# Patient Record
Sex: FEMALE | Race: WHITE | NOT HISPANIC OR LATINO | Employment: OTHER | URBAN - METROPOLITAN AREA
[De-identification: names, ages, dates, MRNs, and addresses within clinical notes are randomized per-mention and may not be internally consistent; named-entity substitution may affect disease eponyms.]

---

## 2020-01-01 ENCOUNTER — APPOINTMENT (OUTPATIENT)
Dept: CT IMAGING | Facility: CLINIC | Age: 50
End: 2020-01-01
Attending: EMERGENCY MEDICINE
Payer: COMMERCIAL

## 2020-01-01 ENCOUNTER — HOSPITAL ENCOUNTER (EMERGENCY)
Facility: CLINIC | Age: 50
Discharge: HOME | End: 2020-01-01
Attending: EMERGENCY MEDICINE | Admitting: EMERGENCY MEDICINE
Payer: COMMERCIAL

## 2020-01-01 VITALS
TEMPERATURE: 97 F | BODY MASS INDEX: 23.8 KG/M2 | WEIGHT: 170 LBS | RESPIRATION RATE: 16 BRPM | DIASTOLIC BLOOD PRESSURE: 66 MMHG | HEART RATE: 59 BPM | OXYGEN SATURATION: 95 % | HEIGHT: 71 IN | SYSTOLIC BLOOD PRESSURE: 103 MMHG

## 2020-01-01 DIAGNOSIS — G44.209 TENSION HEADACHE: ICD-10-CM

## 2020-01-01 DIAGNOSIS — M54.2 NECK PAIN: ICD-10-CM

## 2020-01-01 PROCEDURE — 96361 HYDRATE IV INFUSION ADD-ON: CPT

## 2020-01-01 PROCEDURE — 96375 TX/PRO/DX INJ NEW DRUG ADDON: CPT

## 2020-01-01 PROCEDURE — 96374 THER/PROPH/DIAG INJ IV PUSH: CPT

## 2020-01-01 PROCEDURE — 25000128 H RX IP 250 OP 636: Performed by: EMERGENCY MEDICINE

## 2020-01-01 PROCEDURE — 99285 EMERGENCY DEPT VISIT HI MDM: CPT | Mod: 25

## 2020-01-01 PROCEDURE — 70450 CT HEAD/BRAIN W/O DYE: CPT

## 2020-01-01 PROCEDURE — 25800030 ZZH RX IP 258 OP 636: Performed by: EMERGENCY MEDICINE

## 2020-01-01 RX ORDER — KETOROLAC TROMETHAMINE 30 MG/ML
30 INJECTION, SOLUTION INTRAMUSCULAR; INTRAVENOUS ONCE
Status: COMPLETED | OUTPATIENT
Start: 2020-01-01 | End: 2020-01-01

## 2020-01-01 RX ORDER — HYDROMORPHONE HYDROCHLORIDE 1 MG/ML
0.5 INJECTION, SOLUTION INTRAMUSCULAR; INTRAVENOUS; SUBCUTANEOUS ONCE
Status: COMPLETED | OUTPATIENT
Start: 2020-01-01 | End: 2020-01-01

## 2020-01-01 RX ORDER — HYDROCODONE BITARTRATE AND ACETAMINOPHEN 5; 325 MG/1; MG/1
1-2 TABLET ORAL EVERY 6 HOURS PRN
Qty: 10 TABLET | Refills: 0 | Status: SHIPPED | OUTPATIENT
Start: 2020-01-01 | End: 2020-01-04

## 2020-01-01 RX ORDER — METHOCARBAMOL 750 MG/1
750 TABLET, FILM COATED ORAL 4 TIMES DAILY PRN
Qty: 20 TABLET | Refills: 0 | Status: SHIPPED | OUTPATIENT
Start: 2020-01-01 | End: 2024-07-15

## 2020-01-01 RX ADMIN — KETOROLAC TROMETHAMINE 30 MG: 30 INJECTION, SOLUTION INTRAMUSCULAR at 15:25

## 2020-01-01 RX ADMIN — HYDROMORPHONE HYDROCHLORIDE 0.5 MG: 1 INJECTION, SOLUTION INTRAMUSCULAR; INTRAVENOUS; SUBCUTANEOUS at 16:29

## 2020-01-01 RX ADMIN — PROCHLORPERAZINE EDISYLATE 10 MG: 5 INJECTION INTRAMUSCULAR; INTRAVENOUS at 15:25

## 2020-01-01 RX ADMIN — SODIUM CHLORIDE 1000 ML: 9 INJECTION, SOLUTION INTRAVENOUS at 15:24

## 2020-01-01 ASSESSMENT — ENCOUNTER SYMPTOMS
WEAKNESS: 0
SHORTNESS OF BREATH: 0
VOMITING: 0
NECK PAIN: 1
APPETITE CHANGE: 1
EYE PAIN: 0
ABDOMINAL PAIN: 0
NUMBNESS: 0
FEVER: 0
NAUSEA: 1
HEADACHES: 1
COUGH: 0

## 2020-01-01 ASSESSMENT — MIFFLIN-ST. JEOR: SCORE: 1487.24

## 2020-01-01 NOTE — ED AVS SNAPSHOT
Emergency Department  [address] [facility] [address]  Phone:  [phone]  Fax:  [phone]                                    Debi Reed   MRN: [id number]    Department:   Emergency Department   Date of Visit:  1/1/2020           After Visit Summary Signature Page    I have received my discharge instructions, and my questions have been answered. I have discussed any challenges I see with this plan with the nurse or doctor.    ..........................................................................................................................................  Patient/Patient Representative Signature      ..........................................................................................................................................  Patient Representative Print Name and Relationship to Patient    ..................................................               ................................................  Date                                   Time    ..........................................................................................................................................  Reviewed by Signature/Title    ...................................................              ..............................................  Date                                               Time          22EPIC Rev 08/18

## 2020-01-01 NOTE — ED PROVIDER NOTES
History   Chief Complaint:  Headache    HPI   Debi Reed is a 49 year old female, with a history of neck pain, who presents to the ED for evaluation of headache. The patient reports waking up with a headache three days ago, where it was predominately on the left side of her head, but is felt throughout. This headache was not a severe sudden onset, rather gradually worsened over time. The patient states she had broken her glasses and thought the headache was due to that, but realized she kept waking up with the headache so she thought it couldn't be that. The patient proceeded to get her eye pressure checked out as they felt tenser, but that was noted to be within normal limits today. She does note she feels like the pain is similar to her previous headaches when her neck pain was severe. And since her neck pain has been increasing over the past week, she thought this could be related to the headache. The patient has taken naproxen, Tylenol and even ibuprofen, but has had minimal relief to her symptoms. She does endorse some nausea and lack of appetite. She denies any fever, eye pain, coughing, shortness of breath, vomiting, abdominal pain, numbness, weakness, or any other acute symptoms.     Allergies:  No known drug allergies    Medications:    Abilify  Clonazepam  Synthroid  Cytomel  Ativan  Concerta  Zoloft    Past Medical History:    ADHD  OCD  Anxiety  Thyroid disease  Neck pain    Past Surgical History:    Tonsillectomy  Rectocele repair  Lumbar fusion L5-S1  Lumbar diskectomy  Laser Yag Capsulotomy    Family History:    Asthma  Hypertension    Social History:  Smoking status: Never  Alcohol use: Not currently  Drug use: Never  PCP: Taisha Villa  Presents to the ED with spouse  Marital Status:   [2]    Review of Systems   Constitutional: Positive for appetite change. Negative for fever.   Eyes: Negative for pain and visual disturbance.   Respiratory: Negative for cough and shortness of  "breath.    Gastrointestinal: Positive for nausea. Negative for abdominal pain and vomiting.   Musculoskeletal: Positive for neck pain.   Neurological: Positive for headaches. Negative for weakness and numbness.   All other systems reviewed and are negative.    Physical Exam     Patient Vitals for the past 24 hrs:   BP Temp Temp src Pulse Heart Rate Resp SpO2 Height Weight   01/01/20 1641 103/66 -- -- 59 -- -- 95 % -- --   01/01/20 1639 -- -- -- -- -- -- 95 % -- --   01/01/20 1633 109/71 -- -- 63 -- -- -- -- --   01/01/20 1600 107/56 -- -- 62 -- -- 96 % -- --   01/01/20 1427 124/65 97  F (36.1  C) Temporal -- 71 16 100 % 1.803 m (5' 11\") 77.1 kg (170 lb)     Physical Exam  Nursing note and vitals reviewed.    Constitutional:  Appears well-developed and well-nourished, comfortable.    HENT:    Nose normal.  No discharge.      Oropharynx is clear and moist.  Eyes:    Conjunctivae are normal without injection.     Right pupil is oval shaped chronically.  Lymph:  No enlarged or tender cervical or submandibular lymph nodes.   Cardiovascular:  Normal rate, regular rhythm with normal S1 and S2.      Normal heart sounds and peripheral pulses 2+ and equal.       No murmur or fidelia.  Pulmonary:  Effort normal and breath sounds clear to auscultation bilaterally.     No respiratory distress.  No stridor.     No wheezes. No rales.     GI:    Soft. No distension and no mass. No tenderness.   Musculoskeletal:  Normal range of motion. No extremity deformity. Paracervical tenderness at the base of the skull bilaterally.                                      Neck supple, no midline spinal tenderness.   Neurological:   Alert and oriented. No focal weakness.  No facial droop.  Normal sensation throughout.     Exhibits good muscle tone. Coordination normal.      GCS eye subscore is 4. GCS verbal subscore is 5.      GCS motor subscore is 6.   Skin:    Skin is warm and dry. No rash noted. No diaphoresis.      No erythema. No pallor.  No " lesions.  Psychiatric:   Behavior is normal. Appropriate mood and affect.     Judgment and thought content normal.     Emergency Department Course   Imaging:  Radiographic findings were communicated with the patient who voiced understanding of the findings.    CT Head without contrast:  Normal head CT.    Imaging independently reviewed and agree with radiologist interpretation.     Interventions:  1524: NS 1L IV Bolus   1525: Compazine 10 mg IV  1525: Toradol 30 mg IV  1627: Dilaudid 0.5 mg IV    Emergency Department Course:  Past medical records, nursing notes, and vitals reviewed.  1500: I performed an exam of the patient and obtained history, as documented above.  IV inserted.  The patient was sent for a head CT while in the emergency department, findings above.    1622: I rechecked the patient. Explained findings to patient.    Findings and plan explained to the Patient. Patient discharged home with instructions regarding supportive care, medications, and reasons to return. The importance of close follow-up was reviewed.     Impression & Plan    Medical Decision Making:  Patient comes in with 4 days of a headache.  It started out with her waking up on Sunday, seemed to emanate from her neck and now comes up across her temporal area, worse on the left than the right.  She had a headache similar to this about a year ago that was believed to be related to her chronic neck pain.  She had surgery a year ago November with a laminectomy.  This seems to be a little worse.  It did not start out as a thunderclap headache however.  She does not have any focal neurologic complaints.  An IV was placed and she was given a liter of normal saline, 10 of Compazine, 30 of Toradol, and did feel better.  It brought it down from a 10 to a 6.  I did a CT of the head because this was somewhat different than her other headache and no history of migraines and it was normal.  I do not suspect a subarachnoid hemorrhage.  I looked back at  her note from a year ago when she was seen for headache and it sounded similar and her doctor felt it was secondary to her neck as the primary cause for this tension headache and I agree.  I did give her a dose of Dilaudid before she is discharged and will send her home with a few Norco tablets along with a prescription for Robaxin with follow-up in the next 2 days for physical therapy and further evaluation.  Patient was comfortable with this plan.      Heat alternating with ice, Robaxin as needed for spasm, ibuprofen as needed, Norco for more severe pain.  Contact your clinic tomorrow to get physical therapy set up and further evaluation.    Diagnosis:    ICD-10-CM    1. Tension headache G44.209    2. Neck pain M54.2      Disposition:  Discharged to home.    Discharge Medications:  Discharge Medication List as of 1/1/2020  4:40 PM      START taking these medications    Details   HYDROcodone-acetaminophen (NORCO) 5-325 MG tablet Take 1-2 tablets by mouth every 6 hours as needed for severe pain, Disp-10 tablet, R-0, Local Print      methocarbamol (ROBAXIN) 750 MG tablet Take 1 tablet (750 mg) by mouth 4 times daily as needed for muscle spasms, Disp-20 tablet, R-0, Local Print           Jayson Coffman  1/1/2020    EMERGENCY DEPARTMENT  Scribe Disclosure:  I, Jayson Coffman, am serving as a scribe at 3:00 PM on 1/1/2020 to document services personally performed by Mar Franco MD based on my observations and the provider's statements to me.         Mar Franco MD  01/01/20 [id number]

## 2020-01-01 NOTE — ED TRIAGE NOTES
Headache and neck pain x3 days.  L sided.  No head injury.  Saw eye doc this AM d/t bilat. eye pain and r/o eye pressure.

## 2020-12-14 ENCOUNTER — HEALTH MAINTENANCE LETTER (OUTPATIENT)
Age: 50
End: 2020-12-14

## 2021-10-02 ENCOUNTER — HEALTH MAINTENANCE LETTER (OUTPATIENT)
Age: 51
End: 2021-10-02

## 2022-01-22 ENCOUNTER — HEALTH MAINTENANCE LETTER (OUTPATIENT)
Age: 52
End: 2022-01-22

## 2023-01-14 ENCOUNTER — HEALTH MAINTENANCE LETTER (OUTPATIENT)
Age: 53
End: 2023-01-14

## 2023-04-23 ENCOUNTER — HEALTH MAINTENANCE LETTER (OUTPATIENT)
Age: 53
End: 2023-04-23

## 2024-01-05 ENCOUNTER — TRANSFERRED RECORDS (OUTPATIENT)
Dept: HEALTH INFORMATION MANAGEMENT | Facility: CLINIC | Age: 54
End: 2024-01-05

## 2024-01-08 ENCOUNTER — TRANSFERRED RECORDS (OUTPATIENT)
Dept: HEALTH INFORMATION MANAGEMENT | Facility: CLINIC | Age: 54
End: 2024-01-08

## 2024-01-26 ENCOUNTER — TRANSFERRED RECORDS (OUTPATIENT)
Dept: HEALTH INFORMATION MANAGEMENT | Facility: CLINIC | Age: 54
End: 2024-01-26

## 2024-02-14 ENCOUNTER — TRANSFERRED RECORDS (OUTPATIENT)
Dept: HEALTH INFORMATION MANAGEMENT | Facility: CLINIC | Age: 54
End: 2024-02-14

## 2024-02-27 ENCOUNTER — TRANSCRIBE ORDERS (OUTPATIENT)
Dept: OTHER | Age: 54
End: 2024-02-27

## 2024-02-27 DIAGNOSIS — H20.9 UVEITIS OF RIGHT EYE: Primary | ICD-10-CM

## 2024-03-11 ENCOUNTER — OFFICE VISIT (OUTPATIENT)
Dept: OPHTHALMOLOGY | Facility: CLINIC | Age: 54
End: 2024-03-11
Attending: OPHTHALMOLOGY
Payer: COMMERCIAL

## 2024-03-11 DIAGNOSIS — Z98.890 HISTORY OF LASER PHOTOCOAGULATION OF RETINA: ICD-10-CM

## 2024-03-11 DIAGNOSIS — H40.051 OCULAR HYPERTENSION, RIGHT EYE: ICD-10-CM

## 2024-03-11 DIAGNOSIS — Z98.890 HISTORY OF VITRECTOMY: ICD-10-CM

## 2024-03-11 DIAGNOSIS — Z79.899 HIGH RISK MEDICATION USE: ICD-10-CM

## 2024-03-11 DIAGNOSIS — H20.021 RECURRENT IRITIS OF RIGHT EYE: Primary | ICD-10-CM

## 2024-03-11 PROBLEM — H20.9 UVEITIS OF RIGHT EYE: Status: ACTIVE | Noted: 2024-01-05

## 2024-03-11 PROBLEM — E78.2 MIXED HYPERLIPIDEMIA: Status: ACTIVE | Noted: 2024-02-23

## 2024-03-11 PROCEDURE — 92133 CPTRZD OPH DX IMG PST SGM ON: CPT | Performed by: OPHTHALMOLOGY

## 2024-03-11 PROCEDURE — 76516 ECHO EXAM OF EYE: CPT | Mod: 26 | Performed by: OPHTHALMOLOGY

## 2024-03-11 PROCEDURE — 76513 OPH US DX ANT SGM US UNI/BI: CPT | Performed by: OPHTHALMOLOGY

## 2024-03-11 PROCEDURE — 99211 OFF/OP EST MAY X REQ PHY/QHP: CPT | Mod: 25 | Performed by: OPHTHALMOLOGY

## 2024-03-11 PROCEDURE — 99204 OFFICE O/P NEW MOD 45 MIN: CPT | Performed by: OPHTHALMOLOGY

## 2024-03-11 RX ORDER — PREDNISOLONE ACETATE 10 MG/ML
1 SUSPENSION/ DROPS OPHTHALMIC 4 TIMES DAILY
COMMUNITY
End: 2024-07-15

## 2024-03-11 RX ORDER — BRIMONIDINE TARTRATE AND TIMOLOL MALEATE 2; 5 MG/ML; MG/ML
1 SOLUTION OPHTHALMIC 2 TIMES DAILY
COMMUNITY
End: 2024-07-15

## 2024-03-11 ASSESSMENT — SLIT LAMP EXAM - LIDS
COMMENTS: NORMAL
COMMENTS: NORMAL

## 2024-03-11 ASSESSMENT — CONF VISUAL FIELD
OS_SUPERIOR_NASAL_RESTRICTION: 0
METHOD: COUNTING FINGERS
OS_SUPERIOR_TEMPORAL_RESTRICTION: 0
OS_INFERIOR_TEMPORAL_RESTRICTION: 0
OD_INFERIOR_TEMPORAL_RESTRICTION: 0
OD_NORMAL: 1
OD_INFERIOR_NASAL_RESTRICTION: 0
OD_SUPERIOR_TEMPORAL_RESTRICTION: 0
OD_SUPERIOR_NASAL_RESTRICTION: 0
OS_NORMAL: 1
OS_INFERIOR_NASAL_RESTRICTION: 0

## 2024-03-11 ASSESSMENT — EXTERNAL EXAM - RIGHT EYE: OD_EXAM: NORMAL

## 2024-03-11 ASSESSMENT — REFRACTION_WEARINGRX
OD_SPHERE: -2.25
OS_CYLINDER: +1.00
OS_ADD: +2.75
OD_CYLINDER: +0.50
OS_SPHERE: -1.00
OD_ADD: +2.75
OD_AXIS: 010
OS_AXIS: 120

## 2024-03-11 ASSESSMENT — CUP TO DISC RATIO
OD_RATIO: 0.55
OS_RATIO: 0.5

## 2024-03-11 ASSESSMENT — TONOMETRY
OS_IOP_MMHG: 20
IOP_METHOD: TONOPEN
OD_IOP_MMHG: 21

## 2024-03-11 ASSESSMENT — VISUAL ACUITY
CORRECTION_TYPE: GLASSES
OS_CC: 20/20
METHOD: SNELLEN - LINEAR
OD_CC: 20/25

## 2024-03-11 ASSESSMENT — EXTERNAL EXAM - LEFT EYE: OS_EXAM: NORMAL

## 2024-03-11 NOTE — NURSING NOTE
Chief Complaints and History of Present Illnesses   Patient presents with    Uveitis Evaluation     Chief Complaint(s) and History of Present Illness(es)       Uveitis Evaluation              Laterality: right eye    Onset: years ago    Quality: State the va in the right eye the va has been ok     Associated symptoms: photophobia, flashes (more when it is dark) and floaters (started up on Friday and started the gtts again)    Treatments tried: eye drops    Pain scale: 0/10              Comments    States this last Dec the flare up was the worst is has been.    Has a chest xray and more labs done on Friday   PF used last week   Combigan used last week  FACUNDOAT   Ni Ojeda COT 12:56 PM March 11, 2024

## 2024-03-11 NOTE — LETTER
3/11/2024       RE: Debi Reed  [address]  [facility] [address]     Dear Colleague,    Thank you for referring your patient, Debi Reed, to the [facility]. Please see a copy of my visit note below.    Chief Complaint/Presenting Concern: Uveitis evaluation.    History of Present Illness:   Debi Reed is a 53 year old patient who presents for evaluation of iritis of the right eye from Dr. Obie Lombardi at [facility].    Her ophthalmic history began in 7/01/2010 with cataract surgery in the left eye performed by Dr. Lombardi of [facility]. She had a retained lens fragment which was removed 1 week postoperatively on 7/8/210. She notes there may have been ocular hypertension in that eye, and describes needing a procedure to reduce the pressure with a needle. Shortly thereafter, she reports experiencing a retinal tear in the left eye which was repaired by laser on 7/19/2010 which was further complicated by retinal detachment. This was repaired by another surgeon at [facility] (Dr. Sukhwinder Langley to pts recollection, records unavailable).    She underwent cataract surgery in the right eye on 11/18/2010. She also recalls having a retinal detachment in the right eye which was repaired by laser retinopexy with Dr. Saeed, of [facility] at the time to her recollection.    She does not recall having had a diagnosis of uveitis at or before the time of her cataract surgery. Uveitis has been restricted to the right eye. The documentation she provides from [facility] dates back to 2012 and indicates recurrent episodes of blurry vision with anterior chamber cell and flare and elevated right sided pressures up to mid 20s in the right eye. This was treated at first with durezol and combigan and more recently with prednisolone. Examinations indicate her PCIOL was consistently well centered in each eye.     Flares begin with  a feeling of dry eye followed by dark grey translucent material in her right eye vision most noticeable when she looks at lights, sometimes there is pain when she looks at lights. When this occurs she will self treat with prednisolone, tropicamide. If the pain is severe she will got to the eye clinic and usually starts combigan. She has experienced about 15 flares this year.    Recent flare in December 2023 was so severe that it would not respond to her normal regimen of prednisolone drops. She was unable to work. She saw Dr. Lombardi and received a steroid injection (kenalog injection on 1/2/2024) to the right eye in conjunction with steroid drops finally this got better. She did have issues with elevated intraocular pressure.    Additional Ocular History:   Steroid responder/ocular hypertension right eye. Uses drops when flares occur and pressure sensations occur.   Cataract surgery lens implant each eye  Cataract surgery fragment removal left ee  Vitrectomy/scleral buckle left eye for retinal tear  Retinal laser right eye has been irregular since laser  No uveitis left eye     Relevant Past Medical/Family/Social History:  Recently saw Rheumatologist, Dr. Rigoberto Trammell at [facility] who did labs, X-rays and are deciding on systemic diagnosis. Humira was discussed  Hypothyroidism well on controlled on levothyroxine  No cold sores. Chicken pox as a child and has had one dose of Shingrix and next one coming    Grandmother with Rheumatoid arthritis and possibly uveitis and Glaucoma. Here with her Friend and both are Nurses.    Relevant Review of Systems:  History of bilateral hip pain attributed to bursitis.  Episode of left leg/calf pain and swelling with bruising in late February. This has resolved to some extent but is  when she presses on her calf and she is having some paresthesia.   Joint pain in the left leg   Some morning stiffness in her angle join.  Current rash on her left hand she is treating with  triamcinolone cream. She reports regular pruritus, about monthly. It is intense and often in her torso, legs, or back and associated redness with occasional bumps. The only thing that helps is benadryl; she has not noticed a seasonal pattern.  She has a history of major gastrointestinal urgency and some diarrhea at times.     Laboratory Testing  Abnormal: None noted  Normal/negative:   1/12/2024: Quantiferon,  HLA B27, HIV, Hepatitis C Ab, HBV negative (HBS suggests immunity), CBC with diff  2/13/2024: ASO, CMP, TSH, CRP  3/8/2024: Quantiferon (repeat)  Per Dr. Trammell note 1/12/2024, cannot find documented: ARIS, anti-dsDNA, anti-CCP, RF, Lyme    Rheumatologic Imaging Studies (1/12/2024)--reports reviewed  XR Sacroiliac Impression: Postoperative changes in the lower lumbar spine/sacrum. Mild degenerative changes of the sacroiliac joints. No definite radiographic evidence of sacroiliitis, however MRI is more sensitive if indicated. No acute fracture.    XR Hand Impression: Normal joint spaces and alignment. No erosive change. No fracture.    XR Foot Impression: Mild degenerative changes of the left first MTP joint. No erosive change. No fracture.      Current eye related medications: No drops since last week right eye (prednisolone and Combigan right eye)     Retina/Uveitis Imaging:  OCT Spectralis Macula March 11, 2024  right eye: Myopic, no fluid  left eye: : Myopic, no fluid    RNFL March 11, 2024  Right eye: Avg thickness 78 microns, thin inf nasal, sup temp and borderline sup nasal  Left eye:  Avg thickness 70 microns, thin nasal and superior    Ultrasound biomicroscopy March 11, 2024  right eye: Opacities in AC (cells), normal angle anatomy, iris, lens implant which is well positioned and centered      Assessment:    1. Recurrent iritis of right eye  Active currently     2. Ocular hypertension, right eye  Upper normal today    3. History of laser photocoagulation of retina - right eye   With attached retina in  each eye     4. History of vitrectomy-left eye  And scleral buckle with attached     Plan/Recommendations:  Discussed findings with patient and her friend. The iritis is active in the right eye today even with mild symptoms. This does not appear to have a clear relationship to the lens position (less active) or less likely viral uveitis given no cold sores and already vaccinated with one dose of Shingrix.   Eye pressure is 21,20. There are optic nerve scan abnormalities which are more likely related to myopia (near sighted nerve) and retinal scans are without macular edema in either eye.  The retina is attached peripherally in each eye.   Recommend additional diagnostic testing as noted below.  Let's restart the steroid drop (Prednisolone) 2x/day in the right eye through Friday, 3/15/24. Then starting Saturday, 3/16/24, take one drop ad ay in the right eye to see if this can help reduce risk of recurrence and continue until you see Dr. Lombardi. Perhaps daily drops can help reduce risk of recurrence.   No urgent need for six day medrol dose pack nor weeks of oral prednisone, but that could be considered.  We will get some additional lab testing to consider starting methotrexate, mycophenolate, or azathioprine (TPMT Enzyme, Treponema antibody). We will also get tests for Sjogrens's: (anti-Ro, Anti-La)  We placed a consult to Rheumatology.   Reasonable to consider Dermatology.    RTC Late April-early May. Tonopen, no dilation, no testing    Physician Attestation    Attending Physician Attestation:  Complete documentation of historical and exam elements from today's encounter can be found in the full encounter summary report (not reduplicated in this progress note). I personally obtained the chief complaint(s) and history of present illness. I confirmed and edited as necessary the review of systems, past medical/surgical history, family history, social history, and examination findings as documented by others; and I  examined the patient myself. I personally reviewed the relevant tests, images, and reports as documented above. I formulated and edited as necessary the assessment and plan and discussed the findings and management plan with the patient and any family members present at the time of the visit.  Dheeraj Rich M.D., Uveitis and Medical Retina, March 11, 2024       Again, thank you for allowing me to participate in the care of your patient.      Sincerely,    Dheeraj Rich MD  [facility] Dept of Ophthalmology  Uveitis and Medical Retina

## 2024-03-11 NOTE — PROGRESS NOTES
Chief Complaint/Presenting Concern: Uveitis evaluation    History of Present Illness:   Debi Reed is a 53 year old patient who presents for evaluation of iritis of the right eye from Dr. Obie Lombardi at [facility].    Her ophthalmic history began in 7/01/2010 with cataract surgery in the left eye performed by Dr. Lombardi of [facility]. She had a retained lens fragment which was removed 1 week postoperatively on 7/8/210. She notes there may have been ocular hypertension in that eye, and describes needing a procedure to reduce the pressure with a needle. Shortly thereafter, she reports experiencing a retinal tear in the left eye which was repaired by laser on 7/19/2010 which was further complicated by retinal detachment. This was repaired by another surgeon at [facility] (Dr. Sukhwinder Langley to pts recollection, records unavailable)    She underwent cataract surgery in the right eye on 11/18/2010. She also recalls having a retinal detachment in the right eye which was repaired by laser retinopexy with Dr. Saeed, of [facility] at the time to her recollection.    She does not recall having had a diagnosis of uveitis at or before the time of her cataract surgery. Uveitis has been restricted to the right eye. The documentation she provides from [facility] dates back to 2012 and indicates recurrent episodes of blurry vision with anterior chamber cell and flare and elevated right sided pressures up to mid 20s in the right eye. This was treated at first with durezol and combigan and more recently with prednisolone. Examinations indicate her PCIOL was consistently well centered in each eye.     Flares begin with a feeling of dry eye followed by dark grey translucent material in her right eye vision most noticeable when she looks at lights, sometimes there is pain when she looks at lights. When this occurs she will self treat with prednisolone, tropicamide. If the pain is severe she will got to the eye clinic and usually  myra meredith. She has experienced about 15 flares this year.    Recent flare in December 2023 was so severe that it would not respond to her normal regimen of prednisolone drops. She was unable to work. She saw Dr. Lombardi and received a steroid injection (kenalog injection on 1/2/2024) to the right eye in conjunction with steroid drops finally this got better. She did have issues with elevated intraocular pressure.    Additional Ocular History:   Steroid responder/ocular hypertension right eye. Uses drops when flares occur and pressure sensations occur.   Cataract surgery lens implant each eye  Cataract surgery fragment removal left ee  Vitrectomy/scleral buckle left eye for retinal tear  Retinal laser right eye has been irregular since laser  No uveitis left eye     Relevant Past Medical/Family/Social History:  Recently saw Rheumatologist, Dr. Rigoberto Trammell at [facility] who did labs, X-rays and are deciding on systemic diagnosis. Humira was discussed  Hypothyroidism well on controlled on levothyroxine  No cold sores. Chicken pox as a child and has had one dose of Shingrix and next one coming    Grandmother with Rheumatoid arthritis and possibly uveitis and Glaucoma. Here with her Friend and both are Nurses.    Relevant Review of Systems:  History of bilateral hip pain attributed to bursitis.  Episode of left leg/calf pain and swelling with bruising in late February. This has resolved to some extent but is  when she presses on her calf and she is having some paresthesia.   Joint pain in the left leg   Some morning stiffness in her angle join.  Current rash on her left hand she is treating with triamcinolone cream. She reports regular pruritus, about monthly. It is intense and often in her torso, legs, or back and associated redness with occasional bumps. The only thing that helps is benadryl; she has not noticed a seasonal pattern.  She has a history of major gastrointestinal urgency and some diarrhea at  times.     Laboratory Testing  Abnormal: None noted  Normal/negative:   1/12/2024: Quantiferon,  HLA B27, HIV, Hepatitis C Ab, HBV negative (HBS suggests immunity), CBC with diff  2/13/2024: ASO, CMP, TSH, CRP  3/8/2024: Quantiferon (repeat)  Per Dr. Trammell note 1/12/2024, cannot find documented: ARIS, anti-dsDNA, anti-CCP, RF, Lyme    Rheumatologic Imaging Studies (1/12/2024)--reports reviewed  XR Sacroiliac Impression: Postoperative changes in the lower lumbar spine/sacrum. Mild degenerative changes of the sacroiliac joints. No definite radiographic evidence of sacroiliitis, however MRI is more sensitive if indicated. No acute fracture.    XR Hand Impression: Normal joint spaces and alignment. No erosive change. No fracture.    XR Foot Impression: Mild degenerative changes of the left first MTP joint. No erosive change. No fracture.      Current eye related medications: No drops since last week right eye (prednisolone and Combigan right eye)     Retina/Uveitis Imaging:  OCT Spectralis Macula March 11, 2024  right eye: Myopic, no fluid  left eye: : Myopic, no fluid    RNFL March 11, 2024  Right eye: Avg thickness 78 microns, thin inf nasal, sup temp and borderline sup nasal  Left eye:  Avg thickness 70 microns, thin nasal and superior    Ultrasound biomicroscopy March 11, 2024  right eye: Opacities in AC (cells), normal angle anatomy, iris, lens implant which is well positioned and centered      Assessment:    1. Recurrent iritis of right eye  Active currently     2. Ocular hypertension, right eye  Upper normal today    3. History of laser photocoagulation of retina - right eye   With attached retina in each eye     4. History of vitrectomy-left eye  And scleral buckle with attached     Plan/Recommendations:  Discussed findings with patient and her Friend. The iritis is active in the right eye today even with mild symptoms. This does not appear to have a clear relationship to the lens position (less active) or less  likely viral uveitis given no cold sores and already vaccinated with one dose of Shingrix.   Eye pressure is 21,20. There are optic nerve scan abnormalities which are more likely related to myopia (near sighted nerve) and retinal scans are without macular edema in either eye  The retina is attached peripherally in each eye   Recommend additional diagnostic testing as noted below  Let's restart the steroid drop (Prednisolone) 2x/day in the right eye through Friday, 3/15/24. Then starting Saturday, 3/16/24, take one drop ad ay in the right eye to see if this can help reduce risk of recurrence and continue until you see Dr. Lombardi. Perhaps daily drops can help reduce risk of recurrence   No urgent need for six day medrol dose pack nor weeks of oral prednisone, but that could be considered  We will get some additional lab testing to consider starting methotrexate, mycophenolate, or azathioprine (TPMT Enzyme, Treponema antibody). We will also get tests for Sjogrens's: (anti-Ro, Anti-La). No need to check for hcG as no plans for pregnancy and rarely drinks alcohol.   We placed a consult to Rheumatology and they will call you for an appointment  Reasonable to consider Dermatology    RTC Late April-early May. Tonopen, no dilation, no testing    Physician Attestation     Attending Physician Attestation:  Complete documentation of historical and exam elements from today's encounter can be found in the full encounter summary report (not reduplicated in this progress note). I personally obtained the chief complaint(s) and history of present illness. I confirmed and edited as necessary the review of systems, past medical/surgical history, family history, social history, and examination findings as documented by others; and I examined the patient myself. I personally reviewed the relevant tests, images, and reports as documented above. I formulated and edited as necessary the assessment and plan and discussed the findings and  management plan with the patient and any family members present at the time of the visit.  Dheeraj Rich M.D., Uveitis and Medical Retina, March 11, 2024

## 2024-03-11 NOTE — PATIENT INSTRUCTIONS
Let's restart the steroid drop (Prednisolone) 2x/day in the right eye through Friday, 3/15/24. Then starting Saturday, 3/16/24, take one drop ad ay in the right eye to see if this can help reduce risk of recurrence and continue until you see Dr. Lombardi. Perhaps daily drops can help reduce risk of recurrence   No urgent need for six day medrol dose pack nor weeks of oral prednisone, but that could be considered  We will get some additional lab testing to consider starting methotrexate, mycophenolate, or azathioprine (TPMT Enzyme, Treponema antibody). We will also get tests for Sjogrens's: (anti-Ro, Anti-La)  We placed a consult to Rheumatology   Reasonable to consider Dermatology

## 2024-03-13 ENCOUNTER — LAB (OUTPATIENT)
Dept: LAB | Facility: CLINIC | Age: 54
End: 2024-03-13
Payer: COMMERCIAL

## 2024-03-13 DIAGNOSIS — Z79.899 HIGH RISK MEDICATION USE: ICD-10-CM

## 2024-03-13 DIAGNOSIS — H20.021 RECURRENT IRITIS OF RIGHT EYE: ICD-10-CM

## 2024-03-13 LAB — T PALLIDUM AB SER QL: NONREACTIVE

## 2024-03-13 PROCEDURE — 86235 NUCLEAR ANTIGEN ANTIBODY: CPT

## 2024-03-13 PROCEDURE — 36415 COLL VENOUS BLD VENIPUNCTURE: CPT

## 2024-03-13 PROCEDURE — 99000 SPECIMEN HANDLING OFFICE-LAB: CPT

## 2024-03-13 PROCEDURE — 86780 TREPONEMA PALLIDUM: CPT

## 2024-03-13 PROCEDURE — 84433 ASY THIOPURIN S-MTHYLTRNSFRS: CPT | Mod: 90

## 2024-03-14 ENCOUNTER — TRANSFERRED RECORDS (OUTPATIENT)
Dept: HEALTH INFORMATION MANAGEMENT | Facility: CLINIC | Age: 54
End: 2024-03-14
Payer: COMMERCIAL

## 2024-03-17 LAB — TPMT BLD-CCNC: 26.3 U/ML

## 2024-03-18 LAB
ENA SS-A AB SER IA-ACNC: 0.6 U/ML
ENA SS-A AB SER IA-ACNC: NEGATIVE
ENA SS-B IGG SER IA-ACNC: 0.6 U/ML
ENA SS-B IGG SER IA-ACNC: NEGATIVE

## 2024-05-08 ENCOUNTER — OFFICE VISIT (OUTPATIENT)
Dept: OPHTHALMOLOGY | Facility: CLINIC | Age: 54
End: 2024-05-08
Attending: OPHTHALMOLOGY
Payer: COMMERCIAL

## 2024-05-08 DIAGNOSIS — H20.021 RECURRENT IRITIS OF RIGHT EYE: Primary | ICD-10-CM

## 2024-05-08 DIAGNOSIS — Z79.899 HIGH RISK MEDICATION USE: ICD-10-CM

## 2024-05-08 DIAGNOSIS — H40.051 OCULAR HYPERTENSION, RIGHT EYE: ICD-10-CM

## 2024-05-08 PROCEDURE — 99211 OFF/OP EST MAY X REQ PHY/QHP: CPT | Performed by: OPHTHALMOLOGY

## 2024-05-08 PROCEDURE — 99213 OFFICE O/P EST LOW 20 MIN: CPT | Performed by: OPHTHALMOLOGY

## 2024-05-08 RX ORDER — METHOTREXATE 2.5 MG/1
15 TABLET ORAL
Qty: 30 TABLET | Refills: 2 | Status: SHIPPED | OUTPATIENT
Start: 2024-05-08 | End: 2024-06-06

## 2024-05-08 RX ORDER — FOLIC ACID 1 MG/1
1 TABLET ORAL DAILY
Qty: 60 TABLET | Refills: 2 | Status: SHIPPED | OUTPATIENT
Start: 2024-05-08 | End: 2024-06-06

## 2024-05-08 ASSESSMENT — CONF VISUAL FIELD
OS_SUPERIOR_TEMPORAL_RESTRICTION: 0
OS_SUPERIOR_NASAL_RESTRICTION: 0
OD_SUPERIOR_NASAL_RESTRICTION: 0
OD_INFERIOR_NASAL_RESTRICTION: 0
OS_NORMAL: 1
OS_INFERIOR_TEMPORAL_RESTRICTION: 0
METHOD: COUNTING FINGERS
OD_SUPERIOR_TEMPORAL_RESTRICTION: 0
OD_INFERIOR_TEMPORAL_RESTRICTION: 0
OD_NORMAL: 1
OS_INFERIOR_NASAL_RESTRICTION: 0

## 2024-05-08 ASSESSMENT — EXTERNAL EXAM - LEFT EYE: OS_EXAM: NORMAL

## 2024-05-08 ASSESSMENT — REFRACTION_WEARINGRX
OD_AXIS: 010
OS_ADD: +2.75
OS_CYLINDER: +1.00
OS_AXIS: 120
OD_ADD: +2.75
OD_SPHERE: -2.25
OD_CYLINDER: +0.50
OS_SPHERE: -1.00

## 2024-05-08 ASSESSMENT — VISUAL ACUITY
METHOD: SNELLEN - LINEAR
OD_CC+: -2
OS_CC: 20/20
CORRECTION_TYPE: GLASSES
OD_CC: 20/25

## 2024-05-08 ASSESSMENT — CUP TO DISC RATIO
OD_RATIO: 0.55
OS_RATIO: 0.5

## 2024-05-08 ASSESSMENT — SLIT LAMP EXAM - LIDS
COMMENTS: NORMAL
COMMENTS: NORMAL

## 2024-05-08 ASSESSMENT — TONOMETRY
OD_IOP_MMHG: 18
IOP_METHOD: TONOPEN
OS_IOP_MMHG: 16

## 2024-05-08 ASSESSMENT — EXTERNAL EXAM - RIGHT EYE: OD_EXAM: NORMAL

## 2024-05-08 NOTE — LETTER
5/8/2024       RE: Debi Reed  [address]  [facility] [place] [id number]     Dear Colleague,    Thank you for referring your patient, Debi Reed, to the [facility] [place] at [facility]. Please see a copy of my visit note below.    Chief Complaint/Presenting Concern:  Uveitis follow up    Interval History of Present Ocular Illness:  Debi Reed is a 53 year old patient who returns for follow up of her recurrent iritis. Last visit we got labs for Methotrexate and tapered drops.    Ms. Reed reports the methotrexate ramp up went okay. Eyes feel okay, other than mildly dry. Overall, Methotrexate has been tolerated. Eye pressure checks have been fine and recently was 17 mmHg in each eye.    Interval Updates to Medical/Family/Social History:    Rheumatology scheduled in September    Relevant Review of Systems Updates:  Some fatigue day of Methotrexate but not too bad    Labs: 3/2024: Normal/negative: Sjogren's antibodies, TPMT enzyme, Treponema antibody.     Current eye related medications: Oral Methotrexate (6 pills) weekly--this dose for two weeks, Folic acid daily, no steroid drops for few weeks , artificial tears    Retina/Uveitis Imaging: None today    Assessment:     1. Recurrent iritis of right eye  Improved but some inflammation still present    2. Ocular hypertension, right eye  Normal today    3. High risk medication use  Oral methotrexate (6 pills) weekly    Plan/Recommendations:    Discussed findings with patient.Now on Methotrexate at the current dose of 15 mg (6 pills) weekly for the last few weeks, the inflammation in the right eye is better but not quite resolved. We will continue this dose of Methotrexate and restart steroid drop in the right eye just daily  Eye pressure is normal in each eye. This can be observed  We will follow up with Rheumatology about potentially an earlier visit than September if possible.    Continue these medications unchanged: Oral methotrexate (6 pills) weekly, Folic acid daily.  Please get the CBC and CMP labs done in mid June. We will send you a message to get these done  Please restart the prednisolone drop once daily in the right eye   No combigan right eye needed unless you feel the pressure sensation in the right eye  No drops needed left eye     RTC Perhaps 3 weeks Dr. Lombardi for inflammation and eye pressure check and then restart Combigan right eye daily if eye pressure elevated    Layla early-mid July tonopen, no dilation, no testing    Physician Attestation    Attending Physician Attestation:  Complete documentation of historical and exam elements from today's encounter can be found in the full encounter summary report (not reduplicated in this progress note). I personally obtained the chief complaint(s) and history of present illness. I confirmed and edited as necessary the review of systems, past medical/surgical history, family history, social history, and examination findings as documented by others; and I examined the patient myself. I personally reviewed the relevant tests, images, and reports as documented above. I formulated and edited as necessary the assessment and plan and discussed the findings and management plan with the patient and family members present at the time of this visit.  Dheeraj Rich M.D., Uveitis and Medical Retina, May 8, 2024     Again, thank you for allowing me to participate in the care of your patient.      Sincerely,    Dheeraj Rich MD  [facility] Dept of Ophthalmology  Uveitis and Medical Retina

## 2024-05-08 NOTE — PATIENT INSTRUCTIONS
Continue these medications unchanged: Oral methotrexate (6 pills) weekly, Folic acid daily.  Please get the CBC and CMP labs done in mid June. WE will send you a message to get these done  Please restart the prednisolone drop once daily in the right eye   No combigan right eye needed unless you feel the pressure sensation in the right eye  No drops needed left eye     Perhaps 3 weeks Dr. Lombardi for inflammation and eye pressure check and then restart Combigan right eye daily if eye pressure elevated

## 2024-05-08 NOTE — PROGRESS NOTES
Chief Complaint/Presenting Concern:  Uveitis follow up    Interval History of Present Ocular Illness:  Debi Reed is a 53 year old patient who returns for follow up of her recurrent iritis. Last visit we got labs for methotrexate and tapered drops.    Ms. Reed reports the methotrexate ramp up went okay. Eyes feel okay, other than mildly dry. Overall, methotrexate has been tolerated. Eye pressure checks have been fine and recently was 17 mmHg in each eye.    Interval Updates to Medical/Family/Social History:    Rheumatology scheduled in September    Relevant Review of Systems Updates:  Some fatigue day of methotrexate but not too bad    Labs: 3/2024: Normal/negative: Sjogren's antibodies, TPMT enzyme, Treponema antibody.     Current eye related medications: Oral methotrexate (6 pills) weekly--this dose for two weeks, Folic acid daily, no steroid drops for few weeks , artificial tears    Retina/Uveitis Imaging: None today    Assessment:     1. Recurrent iritis of right eye  Improved but some inflammation still present    2. Ocular hypertension, right eye  Normal today    3. High risk medication use  Oral methotrexate (6 pills) weekly    Plan/Recommendations:    Discussed findings with patient.Now on methotrexate at the current dose of 15 mg (6 pills) weekly for the last few weeks, the inflammation in the right eye is better but not quite resolved. We will continue this dose of methotrexate and restart steroid drop in the right eye just daily  Eye pressure is normal in each eye. This can be observed  We will follow up with Rheumatology about potentially an earlier visit than September if possible.   Continue these medications unchanged: Oral methotrexate (6 pills) weekly, Folic acid daily.  Please get the CBC and CMP labs done in mid June. WE will send you a message to get these done  Please restart the prednisolone drop once daily in the right eye   No combigan right eye needed unless you feel the pressure  sensation in the right eye  No drops needed left eye     RTC Perhaps 3 weeks Dr. Lombardi for inflammation and eye pressure check and then restart Combigan right eye daily if eye pressure elevated    Layla early-mid July tonopen, no dilation, no testing    Physician Attestation     Attending Physician Attestation:  Complete documentation of historical and exam elements from today's encounter can be found in the full encounter summary report (not reduplicated in this progress note). I personally obtained the chief complaint(s) and history of present illness. I confirmed and edited as necessary the review of systems, past medical/surgical history, family history, social history, and examination findings as documented by others; and I examined the patient myself. I personally reviewed the relevant tests, images, and reports as documented above. I formulated and edited as necessary the assessment and plan and discussed the findings and management plan with the patient and family members present at the time of this visit.  Dheeraj Rich M.D., Uveitis and Medical Retina, May 8, 2024

## 2024-05-08 NOTE — NURSING NOTE
Chief Complaints and History of Present Illnesses   Patient presents with    Iritis Follow Up     Chief Complaint(s) and History of Present Illness(es)       Iritis Follow Up              Laterality: right eye    Onset: gradual    Onset: months ago    Quality: States the va is doing ok    Severity: moderate    Frequency: intermittently    Associated symptoms: dryness (more in the am), photophobia, flashes and floaters.  Negative for redness and tearing    Pain scale: 0/10              Comments    Here for recurrent iritis of right eye  Prednisolone taken last week  NPAT BID each eye   Methotrexate   Folic acid   Ni Ojeda COT 10:30 AM May 8, 2024

## 2024-05-10 ENCOUNTER — TELEPHONE (OUTPATIENT)
Dept: RHEUMATOLOGY | Facility: CLINIC | Age: 54
End: 2024-05-10
Payer: COMMERCIAL

## 2024-05-10 NOTE — TELEPHONE ENCOUNTER
----- Message from Colby Calvo MD sent at 5/9/2024  1:53 PM CDT -----  Regarding: FW: Earlier visit?  ? 6-6 or 6-20  ----- Message -----  From: Dheeraj Rich MD  Sent: 5/8/2024  11:43 AM CDT  To: Colby Calvo MD  Subject: Earlier visit?                                   Hi Dr. Calvo:    Debi Reed is scheduled to see you in September for iritis and also to determine if she has any underlying systemic inflammation. She is doing okay on methotrexate and would be grateful to see you sooner if possible, although not urgent.     Thank you for the consideration,  Dheeraj

## 2024-06-06 ENCOUNTER — OFFICE VISIT (OUTPATIENT)
Dept: RHEUMATOLOGY | Facility: CLINIC | Age: 54
End: 2024-06-06
Payer: COMMERCIAL

## 2024-06-06 ENCOUNTER — LAB (OUTPATIENT)
Dept: LAB | Facility: CLINIC | Age: 54
End: 2024-06-06
Payer: COMMERCIAL

## 2024-06-06 VITALS
HEIGHT: 71 IN | BODY MASS INDEX: 27.16 KG/M2 | SYSTOLIC BLOOD PRESSURE: 111 MMHG | OXYGEN SATURATION: 97 % | DIASTOLIC BLOOD PRESSURE: 72 MMHG | WEIGHT: 194 LBS | HEART RATE: 65 BPM

## 2024-06-06 DIAGNOSIS — H20.021 RECURRENT IRITIS OF RIGHT EYE: ICD-10-CM

## 2024-06-06 DIAGNOSIS — Z79.899 HIGH RISK MEDICATION USE: ICD-10-CM

## 2024-06-06 LAB
ALBUMIN SERPL BCG-MCNC: 4.4 G/DL (ref 3.5–5.2)
ALT SERPL W P-5'-P-CCNC: 39 U/L (ref 0–50)
AST SERPL W P-5'-P-CCNC: 29 U/L (ref 0–45)
CREAT SERPL-MCNC: 0.61 MG/DL (ref 0.51–0.95)
CRP SERPL-MCNC: <3 MG/L
EGFRCR SERPLBLD CKD-EPI 2021: >90 ML/MIN/1.73M2
ERYTHROCYTE [DISTWIDTH] IN BLOOD BY AUTOMATED COUNT: 13.4 % (ref 10–15)
HCT VFR BLD AUTO: 37.9 % (ref 35–47)
HGB BLD-MCNC: 12.7 G/DL (ref 11.7–15.7)
MCH RBC QN AUTO: 30.6 PG (ref 26.5–33)
MCHC RBC AUTO-ENTMCNC: 33.5 G/DL (ref 31.5–36.5)
MCV RBC AUTO: 91 FL (ref 78–100)
PLATELET # BLD AUTO: 193 10E3/UL (ref 150–450)
RBC # BLD AUTO: 4.15 10E6/UL (ref 3.8–5.2)
RHEUMATOID FACT SERPL-ACNC: <10 IU/ML
WBC # BLD AUTO: 5 10E3/UL (ref 4–11)

## 2024-06-06 PROCEDURE — 86200 CCP ANTIBODY: CPT

## 2024-06-06 PROCEDURE — 86038 ANTINUCLEAR ANTIBODIES: CPT

## 2024-06-06 PROCEDURE — 86431 RHEUMATOID FACTOR QUANT: CPT

## 2024-06-06 PROCEDURE — G2211 COMPLEX E/M VISIT ADD ON: HCPCS | Performed by: INTERNAL MEDICINE

## 2024-06-06 PROCEDURE — 82565 ASSAY OF CREATININE: CPT

## 2024-06-06 PROCEDURE — 85027 COMPLETE CBC AUTOMATED: CPT

## 2024-06-06 PROCEDURE — 99204 OFFICE O/P NEW MOD 45 MIN: CPT | Performed by: INTERNAL MEDICINE

## 2024-06-06 PROCEDURE — 86140 C-REACTIVE PROTEIN: CPT

## 2024-06-06 PROCEDURE — 36415 COLL VENOUS BLD VENIPUNCTURE: CPT

## 2024-06-06 PROCEDURE — 84460 ALANINE AMINO (ALT) (SGPT): CPT

## 2024-06-06 PROCEDURE — 84450 TRANSFERASE (AST) (SGOT): CPT

## 2024-06-06 PROCEDURE — 82040 ASSAY OF SERUM ALBUMIN: CPT

## 2024-06-06 RX ORDER — METHOTREXATE 2.5 MG/1
20 TABLET ORAL
Qty: 96 TABLET | Refills: 3 | Status: SHIPPED | OUTPATIENT
Start: 2024-06-06

## 2024-06-06 RX ORDER — FOLIC ACID 1 MG/1
1 TABLET ORAL DAILY
Qty: 60 TABLET | Refills: 2 | Status: SHIPPED | OUTPATIENT
Start: 2024-06-06

## 2024-06-06 RX ORDER — MECLIZINE HYDROCHLORIDE 25 MG/1
25 TABLET ORAL PRN
COMMUNITY
Start: 2024-05-22

## 2024-06-06 RX ORDER — ONDANSETRON 4 MG/1
4 TABLET, ORALLY DISINTEGRATING ORAL PRN
COMMUNITY
Start: 2024-05-22

## 2024-06-06 ASSESSMENT — PAIN SCALES - GENERAL: PAINLEVEL: NO PAIN (0)

## 2024-06-06 ASSESSMENT — PATIENT HEALTH QUESTIONNAIRE - PHQ9: SUM OF ALL RESPONSES TO PHQ QUESTIONS 1-9: 14

## 2024-06-06 NOTE — PROGRESS NOTES
Rheumatology Clinic Visit  [facility]  Colby Calvo M.D.     Debi Reed MRN# [id number]   YOB: 1970 Age: 53 year old   Date of Visit: 06/06/2024  Primary care provider: Taisha Villa          Assessment and Plan:     # Recurrent, chronic right eye uveitis with recent vision changes    Data: In March 2024, TB, hepatitis B, hepatitis C all negative; transaminases normal; ASO negative; GFR greater than 90; CK normal; CBC normal; CRP negative; TSH normal.  HIV negative.  HLA-B27 negative. Treponema negative.    Imaging: CXR 3-24 negative    Discussion: I find no clear-cut evidence of systemic rheumatic disease or autoimmunity associated with recurrent iritis.  Skin rash, morning stiffness and fatigue could be related to such a syndrome, and I recommend additional screening testing.  For management of recalcitrant uveitis, I agree with Dr. Rich recommendation for methotrexate.  I recommend methotrexate ramp up to maximum dose to find whether monotherapy could be sufficient.  If monotherapy is not sufficient after maximum dose exposure, I recommend consideration of combination therapy with TNF inhibitor and methotrexate.      \We discussed    Diagnosis:  1.  Recurrent uveitis/iritis, right eye  2.  No consistent pattern of symptoms or signs suggesting presence of rheumatic or autoimmune disease.  Recommend several additional screening laboratories.    Plan:  1.  Antinuclear antibody, rheumatoid factor, cyclic citrullinated peptide antibodies  2.  Increase methotrexate to 20 mg (8 tablets) once weekly.  May split the dose if queasiness, stomach upset with full 8 tablet dose. While on methotrexate:   -- Check blood tests every 3 months (AST/ALT, Albumin, CBC with platelets)   -- Limit alcohol intake to 2 drinks weekly; use folate 1 mg daily.  --Tylenol 500-1000 mg can be used as needed up to three times daily for nausea/headache associated with dosing.    3.  If higher dose of  "methotrexate is not associated with improved signs of iritis after 4 to 6 weeks, can safely increase methotrexate dose once again to 25 mg weekly, vision will depend upon Dr. Rich  evaluation.  4.  If higher dose methotrexate is not effective or not well-tolerated, could consider combination therapy with TNF inhibitor plus methotrexate.  Recommend consideration of Humira.    RTC 3 mos    Colby Calvo MD  Staff Rheumatologist, [facility]    Orders Placed This Encounter   Procedures    Rheumatoid factor    Cyclic Citrullinated Peptide Antibody IgG    Anti Nuclear Deanna IgG by IFA with Reflex    CBC with platelets    AST    ALT    Albumin level    Creatinine    CRP inflammation       On the day of the encounter, a total of 45 minutes was spent in chart review, and in counseling and coordination of care, regarding the patient's complex medical problem of iritis, concern for systemic autoimmune disease    The longitudinal plan of care for the diagnosis(es)/condition(s) as documented were addressed during this visit. Due to the added complexity in care, I will continue to support Debi in the subsequent management and with ongoing continuity of care.           History of Present Illness:   Debi M Reed presents for evaluation of recurrent iritis.  Referred by Dr. Rich in ophthalmology.    Interval history June 6, 2024    Patient was seen by Dr. Rich in May 2024.  Impression was of recurrent iritis.  Right eye iritis was judged active.  History of recent methotrexate ramp-up was reviewed. recommendation was to continue methotrexate 15 mg weekly.    SHE HAS had iritis in R eye for \"more than 10 years\"  She had been managed with topicals until 2023.  She had \"whiteout\" of r eye vision in late 2023; had intraocular injection and iincreased topicals Rx.  She learned from Dr. Rich in 3-2024 that cells were still present despite visual improvement.  Methotrexate  She had another flare 2weeks ago, " worsening right after followup with Ophtho.     R leg and foot swelled with pain and pitting edema: DVT workup negative. Skin was sensitive. Concern was for EN given skin discoloration, non-traumatic bruising on R leg.  Notes intermittent itchy rashes on her hands, possibly associated with frequent hand-washing at work.    + hot flash type symptoms  + bladder and bowel urgency intermittent. Occasional 1-2 days of liquid stools, sudden onset 2 times monthly. No blood/+ mucus.    She exercises frequently with pilates, gym work.           Review of Systems:     Constitutional: constantly tired, no energy, + weight gain  Skin: negative  Eyes: HPI    Ears/Nose/Throat: vertigo several weeks ago, sudden onset with room spinning, now performing PTx to address, sx better    Respiratory: No shortness of breath, dyspnea on exertion, cough, or hemoptysis  Cardiovascular: negative  Gastrointestinal: negative  Genitourinary: negative  Musculoskeletal: in  R leg and foot swelled with pain and pitting edema: DVT workup negative. Skin was sensitive.  Neurologic: negative  Psychiatric: negative  Hematologic/Lymphatic/Immunologic: negative  Endocrine: negative         Active Problem List:     Patient Active Problem List    Diagnosis Date Noted    Mixed hyperlipidemia 2024     Priority: Medium     Formatting of this note might be different from the original.   Last Lipids:   Chol: 2024 243    293   HDL: 2024 37   Chol/HDL Ratio: 2024 6.57   LDL: 2024 147   LDL DIRECT: . No results found in past 5 years   Non-HDL: 2024 206      Uveitis of right eye 2024     Priority: Medium    Hypothyroidism 2003     Priority: Medium     Hypothyroidism Acquired              Past Medical History:     Past Medical History:   Diagnosis Date    Hypothyroidism     Recurrent acute iridocyclitis of right eye     Sinusitis, chronic      Past Surgical History:   Procedure Laterality Date    CATARACT  IOL, RT/LT Bilateral 11/18/2010    LASER YAG CAPSULOTOMY Left 02/04/2016    Procedure: LASER YAG CAPSULOTOMY;  Surgeon: Obie Lombardi MD;  Location:  [facility]    VITRECTOMY PARSPLANA, SCLERAL BUCKLE, COMBINED Left     YAG CAPSULOTOMY OS (LEFT EYE)              Social History:     Social History     Socioeconomic History    Marital status:      Spouse name: Not on file    Number of children: Not on file    Years of education: Not on file    Highest education level: Not on file   Occupational History    Not on file   Tobacco Use    Smoking status: Never    Smokeless tobacco: Never   Substance and Sexual Activity    Alcohol use: Not Currently    Drug use: Never    Sexual activity: Not on file   Other Topics Concern    Not on file   Social History Narrative    Not on file     Social Determinants of Health     Financial Resource Strain: Low Risk  (2/23/2024)    [facility]    Financial Resource Strain     Difficulty of Paying Living Expenses: 3     Difficulty of Paying Living Expenses: Not on file   Food Insecurity: No Food Insecurity (2/23/2024)    [facility]    Food Insecurity     Worried About Running Out of Food in the Last Year: 1   Transportation Needs: No Transportation Needs (2/23/2024)    [facility]    Transportation Needs     Lack of Transportation (Medical): 1   Physical Activity: Not on file   Stress: Not on file   Social Connections: Socially Integrated (2/23/2024)    [facility], [facility]    Social Connections     Frequency of Communication with Friends and Family: 0   Interpersonal Safety: Not on file   Housing Stability: Low Risk   "(2/23/2024)    [facility], [employer] [facility] [employer] [facility]    Housing Stability     Unable to Pay for Housing in the Last Year: 1     Employed as nurse  Drinks 1 wine mnthly  No smoking.       Family History:   No family history on file.    MGM had severe RA.  Mother with thyroid/goiter  2 siblings with thyroid  No IBD,          Allergies:     Allergies   Allergen Reactions    Morphine Nausea and Other (See Comments)     Auditory hallucinations.    Cobalt Rash    Latex Rash    Nickel Rash            Medications:     Current Outpatient Medications   Medication Sig Dispense Refill    brimonidine-timolol (COMBIGAN) 0.2-0.5 % ophthalmic solution Place 1 drop into the right eye 2 times daily      ClonazePAM (KLONOPIN PO) Take 1 mg by mouth at bedtime      folic acid (FOLVITE) 1 MG tablet Take 1 tablet (1 mg) by mouth daily 60 tablet 2    Levothyroxine Sodium (SYNTHROID PO) Take 200 mcg by mouth daily      LORazepam (ATIVAN PO) Take 0.5 mg by mouth 2 - 3 x / day      meclizine (ANTIVERT) 25 MG tablet Take 25 mg by mouth as needed      methotrexate 2.5 MG tablet Take 6 tablets (15 mg) by mouth every 7 days Please also take Folic Acid vitamin daily 30 tablet 2    ondansetron (ZOFRAN ODT) 4 MG ODT tab Place 4 mg under the tongue as needed      prednisoLONE acetate (PRED FORTE) 1 % ophthalmic suspension Place 1 drop into the right eye 4 times daily      Sertraline HCl (ZOLOFT PO) Take 200 mg by mouth      methocarbamol (ROBAXIN) 750 MG tablet Take 1 tablet (750 mg) by mouth 4 times daily as needed for muscle spasms 20 tablet 0            Physical Exam:   Blood pressure 111/72, pulse 65, height 1.803 m (5' 11\"), weight 88 kg (194 lb), SpO2 97%.  Wt Readings from Last 6 Encounters:   06/06/24 88 kg (194 lb)   01/01/20 77.1 kg (170 lb)     Body mass index is 27.06 kg/m .  Constitutional: well-developed, appearing stated age; cooperative  Eyes: nl EOM, PERRLA, vision, " conjunctiva, sclera  ENT: nl external ears, nose, hearing, lips, teeth, gums, throat  No mucous membrane lesions, normal saliva pool  Neck: no mass or thyroid enlargement  Resp: Breathing unlabored.  CV: RRR, no murmurs, rubs or gallops, no edema    MS: The TMJ, neck, shoulder, elbow, wrist, MCP/PIP/DIP, spine, hip, knee, ankle, and foot MTP/IP joints were examined and found normal. No active synovitis or altered joint anatomy. Full joint ROM. Normal  strength. No dactylitis,  tenosynovitis, enthesopathy.  Skin: no nail pitting, alopecia, rash, nodules or lesions  Neuro: nl cranial nerves, strength, sensation, DTRs.   Psych: nl judgement, orientation, memory, affect.         Data:     @       No data to display                 ,  ,  ,  ,   SSA (Ro) Antibody IgG   Date Value Ref Range Status   03/13/2024 Negative Negative Final     SSB (La) Antibody IgG   Date Value Ref Range Status   03/13/2024 Negative Negative Final   ,  ,  ,  ,  ,  ,  ,  ,  ,  ,  ,  ,  ,  ,  ,  ,  ,  ,  ,  ,  ,  ,  ,  ,  ,  ,  ,  ,  ,  ,  ,  ,  ,  ,  ,  ,  ,  ,  ,  ,

## 2024-06-06 NOTE — PATIENT INSTRUCTIONS
Diagnosis:  1.  Recurrent uveitis/iritis, right eye  2.  No consistent pattern of symptoms or signs suggesting presence of rheumatic or autoimmune disease.  Recommend several additional screening laboratories.    Plan:  1.  Antinuclear antibody, rheumatoid factor, cyclic citrullinated peptide antibodies  2.  Increase methotrexate to 20 mg (8 tablets) once weekly.  May split the dose if queasiness, stomach upset with full 8 tablet dose. While on methotrexate:   -- Check blood tests every 3 months (AST/ALT, Albumin, CBC with platelets)   -- Limit alcohol intake to 2 drinks weekly; use folate 1 mg daily.  --Tylenol 500-1000 mg can be used as needed up to three times daily for nausea/headache associated with dosing.    3.  If higher dose of methotrexate is not associated with improved signs of iritis after 4 to 6 weeks, can safely increase methotrexate dose once again to 25 mg weekly, vision will depend upon Dr. Rich  evaluation.  4.  If higher dose methotrexate is not effective or not well-tolerated, could consider combination therapy with TNF inhibitor plus methotrexate.  Recommend consideration of Humira.

## 2024-06-06 NOTE — NURSING NOTE
"Chief Complaint   Patient presents with    New Patient     Recurrent iritis of right eye       Vitals:    06/06/24 0835   BP: 111/72   BP Location: Left arm   Patient Position: Sitting   Cuff Size: Adult Regular   Pulse: 65   SpO2: 97%   Weight: 88 kg (194 lb)   Height: 1.803 m (5' 11\")       Body mass index is 27.06 kg/m .      PRABHAKAR Andersen    "

## 2024-06-06 NOTE — PROGRESS NOTES
Depression Response    Patient completed the PHQ-9 assessment for depression and scored >9? Yes  Question 9 on the PHQ-9 was positive for suicidality? Yes  Does patient have current mental health provider? Yes    Is this a virtual visit? No    I personally notified the following: visit provider

## 2024-06-07 LAB — ANA SER QL IF: NEGATIVE

## 2024-06-10 LAB — CCP AB SER IA-ACNC: 1 U/ML

## 2024-06-23 ENCOUNTER — TELEPHONE (OUTPATIENT)
Dept: URGENT CARE | Facility: URGENT CARE | Age: 54
End: 2024-06-23
Payer: COMMERCIAL

## 2024-06-23 DIAGNOSIS — F41.9 ANXIETY: Primary | ICD-10-CM

## 2024-06-23 RX ORDER — CLONAZEPAM 1 MG/1
1-2 TABLET ORAL
Qty: 10 TABLET | Refills: 0 | Status: SHIPPED | OUTPATIENT
Start: 2024-06-23

## 2024-06-23 NOTE — TELEPHONE ENCOUNTER
Patient on clonazepam daily long-term.  Has call in to prescriber for refill -- no response.  At risk of benzodiazepine withdrawal. Will cover small bridging supply.    Javier Kelly MD

## 2024-07-15 ENCOUNTER — OFFICE VISIT (OUTPATIENT)
Dept: OPHTHALMOLOGY | Facility: CLINIC | Age: 54
End: 2024-07-15
Attending: OPHTHALMOLOGY
Payer: COMMERCIAL

## 2024-07-15 DIAGNOSIS — H40.051 OCULAR HYPERTENSION, RIGHT EYE: ICD-10-CM

## 2024-07-15 DIAGNOSIS — Z79.899 HIGH RISK MEDICATION USE: ICD-10-CM

## 2024-07-15 DIAGNOSIS — H20.021 RECURRENT IRITIS OF RIGHT EYE: Primary | ICD-10-CM

## 2024-07-15 PROCEDURE — 99213 OFFICE O/P EST LOW 20 MIN: CPT | Performed by: OPHTHALMOLOGY

## 2024-07-15 RX ORDER — PREDNISOLONE ACETATE 10 MG/ML
1 SUSPENSION/ DROPS OPHTHALMIC DAILY
Qty: 5 ML | Refills: 4 | Status: SHIPPED | OUTPATIENT
Start: 2024-07-15

## 2024-07-15 RX ORDER — BRIMONIDINE TARTRATE AND TIMOLOL MALEATE 2; 5 MG/ML; MG/ML
1 SOLUTION OPHTHALMIC 2 TIMES DAILY
Qty: 5 ML | Refills: 5 | Status: SHIPPED | OUTPATIENT
Start: 2024-07-15

## 2024-07-15 ASSESSMENT — REFRACTION_WEARINGRX
OD_ADD: +2.75
OS_CYLINDER: +1.00
OD_CYLINDER: +0.50
OD_SPHERE: -2.25
OD_AXIS: 010
OS_AXIS: 120
OS_SPHERE: -1.00
OS_ADD: +2.75

## 2024-07-15 ASSESSMENT — CUP TO DISC RATIO
OS_RATIO: 0.5
OD_RATIO: 0.55

## 2024-07-15 ASSESSMENT — CONF VISUAL FIELD
OS_SUPERIOR_TEMPORAL_RESTRICTION: 0
OD_INFERIOR_TEMPORAL_RESTRICTION: 0
OD_NORMAL: 1
OS_NORMAL: 1
OS_SUPERIOR_NASAL_RESTRICTION: 0
OS_INFERIOR_NASAL_RESTRICTION: 0
OD_INFERIOR_NASAL_RESTRICTION: 0
OS_INFERIOR_TEMPORAL_RESTRICTION: 0
METHOD: COUNTING FINGERS
OD_SUPERIOR_TEMPORAL_RESTRICTION: 0
OD_SUPERIOR_NASAL_RESTRICTION: 0

## 2024-07-15 ASSESSMENT — TONOMETRY
IOP_METHOD: TONOPEN
OS_IOP_MMHG: 16
OD_IOP_MMHG: 17

## 2024-07-15 ASSESSMENT — VISUAL ACUITY
METHOD: SNELLEN - LINEAR
OD_CC: 20/30
OS_CC: 20/20
CORRECTION_TYPE: GLASSES
OS_CC+: -2

## 2024-07-15 ASSESSMENT — SLIT LAMP EXAM - LIDS
COMMENTS: NORMAL
COMMENTS: NORMAL

## 2024-07-15 ASSESSMENT — EXTERNAL EXAM - RIGHT EYE: OD_EXAM: NORMAL

## 2024-07-15 ASSESSMENT — EXTERNAL EXAM - LEFT EYE: OS_EXAM: NORMAL

## 2024-07-15 NOTE — PATIENT INSTRUCTIONS
Continue Oral methotrexate now 20 mg (8 tablets) weekly and folic acid daily  Let's restart Prednisolone right eye once daily in the right eye for two weeks (until 7/29/24). Then do one drop every other day in the right eye and stop one week before our next visit. If still active at that time even with more time on this dose of methotrexate, we may ask about higher dose of methotrexate   No need for Combigan

## 2024-07-15 NOTE — LETTER
7/15/2024       RE: Debi Reed  [address]  [place] [facility] [place] [id number]     Dear Colleague,    Thank you for referring your patient, Debi Reed, to the [facility] [place] at [facility]. Please see a copy of my visit note below.    Chief Complaint/Presenting Concern: Uveitis follow-up    Interval History of Present Ocular Illness:  Debi Reed is a 53 year old patient who returns for follow up of her recurrent iritis of the right eye.  Last saw each other in May 2024 at which time there was improving inflammation on Methotrexate.  We supported the plan to continue this medication with the use of Prednisone once daily.    Since then, Ms. Reed saw Dr. Lombardi and there was no inflammation in the right eye, and eye pressure was 21.Today, Debi Reed reports things were doing well without drops for a few weeks. Left eye is a little dry but no discomfort recently.     Interval Updates to Medical/Family/Social History:    Ms. Reed saw Dr. Calvo on June 6, 2024.  He recommended increasing Methotrexate to 20 mg weekly and potentially up to 25 mg if needed based on her visit today    Relevant Review of Systems Updates:  Still tired day of Methotrexate and sometimes next day. This has not been impacted work.    Labs: 6/6/24: Normal/negative: CRP, creatinine, ALT, AST, CBC, ARIS, CCP, rheumatoid factor     Current eye related medications: Oral methotrexate now 20 mg (8 tablets weekly--at least 3 weeks), folic acid daily, no prednisolone last few weeks, off Combigan    Retina/Uveitis Imaging: None today        Assessment:     1. Recurrent iritis of right eye  Improving but still active    2. Ocular hypertension, right eye  Normal off Combigan    3. High risk medication use  Oral Methotrexate now 20 mg (8 pills) weekly    Plan/Recommendations:    Discussed findings with patient.  There is improving but still some inflammation in  the right eye. This is better than last visit but still active. Given some Methotrexate side effects at this dose, we will try drops again a bit longer but prefer to continue the same dose of Methotrexate for now.   The left eye remains inactive and can be monitored  Eye pressure is 17, 16.  We can continue to monitor off Combigan  Recommend additional testing: None at this time  Continue Oral Methotrexate now 20 mg (8 tablets) weekly and folic acid daily  Let's restart Prednisolone right eye once daily in the right eye for two weeks (until 7/29/24). Then do one drop every other day in the right eye and stop one week before our next visit. If still active at that time even with more time on this dose of methotrexate, we may ask about higher dose of Methotrexate   No need for Combigan    RTC Late August/early September (before 9/5/24) tonopen, no dilation, no testing    Physician Attestation    Attending Physician Attestation:  Complete documentation of historical and exam elements from today's encounter can be found in the full encounter summary report (not reduplicated in this progress note). I personally obtained the chief complaint(s) and history of present illness. I confirmed and edited as necessary the review of systems, past medical/surgical history, family history, social history, and examination findings as documented by others; and I examined the patient myself. I personally reviewed the relevant tests, images, and reports as documented above. I formulated and edited as necessary the assessment and plan and discussed the findings and management plan with the patient and family members present at the time of this visit.  Dheeraj Rich M.D., Uveitis and Medical Retina, July 15, 2024     Again, thank you for allowing me to participate in the care of your patient.      Sincerely,    Dheeraj Rich MD  [facility] Dept of Ophthalmology  Uveitis and Medical Retina

## 2024-07-15 NOTE — NURSING NOTE
Chief Complaints and History of Present Illnesses   Patient presents with    Iritis Follow Up     Chief Complaint(s) and History of Present Illness(es)       Iritis Follow Up              Laterality: right eye    Onset: gradual    Onset: months ago    Quality: States the va is ok      Severity: moderate    Frequency: intermittently    Associated symptoms: dryness, photophobia, flashes and floaters.  Negative for redness and tearing    Treatments tried: eye drops and artificial tears    Pain scale: 0/10              Comments    Here for recurrent iritis of right eye  Prednisolone last taken couple weeks ago  AT  Methotrexate   Folic acid  Ni Ojeda COT 10:33 AM July 15, 2024

## 2024-07-15 NOTE — PROGRESS NOTES
Chief Complaint/Presenting Concern: Uveitis follow-up    Interval History of Present Ocular Illness:  Debi Reed is a 53 year old patient who returns for follow up of her recurrent iritis of the right eye.  Last saw each other in May 2024 at which time there was improving inflammation on methotrexate.  We supported the plan to continue this medication with the use of prednisone once daily.    Since then, Ms. Reed saw Dr. Lombardi and there was no inflammation in the right eye, and eye pressure was 21.Today, Debi Reed reports things were doing well without drops for a few weeks. Left eye is a little dry but no discomfort recently.     Interval Updates to Medical/Family/Social History:    Ms. Reed saw Dr. Calvo on June 6, 2024.  He recommended increasing methotrexate to 20 mg weekly and potentially up to 25 mg if needed based on her visit today    Relevant Review of Systems Updates:  Still tired day of methotrexate and sometimes next day. This has not been impacted work.    Labs: 6/6/24: Normal/negative: CRP, creatinine, ALT, AST, CBC, ARIS, CCP, rheumatoid factor     Current eye related medications: Oral methotrexate now 20 mg (8 tablets weekly--at least 3 weeks), folic acid daily, no prednisolone last few weeks, off Combigan    Retina/Uveitis Imaging: None today    Assessment:     1. Recurrent iritis of right eye  Improving but still active    2. Ocular hypertension, right eye  Normal off Combigan    3. High risk medication use  Oral methotrexate now 20 mg (8 pills) weekly    Plan/Recommendations:    Discussed findings with patient.  There is improving but still some inflammation in the right eye. This is better than last visit but still active. Given some methotrexate side effects at this dose, we will try drops again a bit longer but prefer to continue the same dose of methotrexate for now.   The left eye remains inactive and can be monitored  Eye pressure is 17, 16.  We can continue to monitor  off Combigan  Recommend additional testing: None at this time  Continue Oral methotrexate now 20 mg (8 tablets) weekly and folic acid daily  Let's restart Prednisolone right eye once daily in the right eye for two weeks (until 7/29/24). Then do one drop every other day in the right eye and stop one week before our next visit. If still active at that time even with more time on this dose of methotrexate, we may ask about higher dose of methotrexate   No need for Combigan    RTC Late August/early September (before 9/5/24) tonopen, no dilation, no testing    Physician Attestation     Attending Physician Attestation:  Complete documentation of historical and exam elements from today's encounter can be found in the full encounter summary report (not reduplicated in this progress note). I personally obtained the chief complaint(s) and history of present illness. I confirmed and edited as necessary the review of systems, past medical/surgical history, family history, social history, and examination findings as documented by others; and I examined the patient myself. I personally reviewed the relevant tests, images, and reports as documented above. I formulated and edited as necessary the assessment and plan and discussed the findings and management plan with the patient and family members present at the time of this visit.  Dheeraj Rich M.D., Uveitis and Medical Retina, July 15, 2024

## 2024-09-04 ENCOUNTER — LAB (OUTPATIENT)
Dept: LAB | Facility: CLINIC | Age: 54
End: 2024-09-04
Payer: COMMERCIAL

## 2024-09-04 DIAGNOSIS — Z79.899 HIGH RISK MEDICATION USE: ICD-10-CM

## 2024-09-04 LAB
ERYTHROCYTE [DISTWIDTH] IN BLOOD BY AUTOMATED COUNT: 13 % (ref 10–15)
HCT VFR BLD AUTO: 35.3 % (ref 35–47)
HGB BLD-MCNC: 12.1 G/DL (ref 11.7–15.7)
MCH RBC QN AUTO: 31.8 PG (ref 26.5–33)
MCHC RBC AUTO-ENTMCNC: 34.3 G/DL (ref 31.5–36.5)
MCV RBC AUTO: 93 FL (ref 78–100)
PLATELET # BLD AUTO: 176 10E3/UL (ref 150–450)
RBC # BLD AUTO: 3.81 10E6/UL (ref 3.8–5.2)
WBC # BLD AUTO: 5.2 10E3/UL (ref 4–11)

## 2024-09-04 PROCEDURE — 82040 ASSAY OF SERUM ALBUMIN: CPT

## 2024-09-04 PROCEDURE — 84450 TRANSFERASE (AST) (SGOT): CPT

## 2024-09-04 PROCEDURE — 86140 C-REACTIVE PROTEIN: CPT

## 2024-09-04 PROCEDURE — 36415 COLL VENOUS BLD VENIPUNCTURE: CPT

## 2024-09-04 PROCEDURE — 84460 ALANINE AMINO (ALT) (SGPT): CPT

## 2024-09-04 PROCEDURE — 85027 COMPLETE CBC AUTOMATED: CPT

## 2024-09-04 PROCEDURE — 82565 ASSAY OF CREATININE: CPT

## 2024-09-04 NOTE — PROGRESS NOTES
Rheumatology Clinic Visit  [facility]  Colby Calvo M.D.     Debi Reed MRN# [id number]   YOB: 1970 Age: 53 year old   Date of Visit: 09/05/2024  Primary care provider: Taisha Villa          Assessment and Plan:     # Recurrent, chronic right eye uveitis with intermittent vision changes  # Fatigue, chronic    Patient relates waxing and waning, less intense, but frequently occurring right thigh vision changes and obstruction.  Patient has tolerated increased dose of methotrexate since June 2024 without clear-cut adverse effects.    Data: On September 4, 2024, CBC, comprehensive metabolic panel, CRP were all normal or negative.  In February 2024, TSH was normal.  On June 6, 2024 rheumatoid factor, cyclic citrullinated peptide antibodies, ARIS, EDWARD panel including SSA and SSB, treponema antibodies, TPMT were all negative or normal.   Imaging:  Chest x-ray March 2023 normal.    Discussion: I continue to find no clear-cut evidence of systemic rheumatic disease or autoimmunity associated with recurrent iritis.  Symptomatically, and according to regular ophthalmology examinations, iritis continues active, albeit at less intensity.  For management of recalcitrant uveitis, I recommend ramp-up of methotrexate to maximum dose 25 mg weekly, pending Dr. Rich's approval (patient states follow-up visit with ophthalmology scheduled for September 6, 2024).  If monotherapy is not sufficient after 4 to 6 weeks of maximum methotrexate dose exposure, I recommend consideration of combination therapy with TNF inhibitor plus methotrexate.    We briefly discussed patient's longstanding and ongoing fatigue. I do not think that methotrexate is a major contributor to constitutional symptoms.  Rather, issues of sleep hygiene versus psychotropic medications (daily benzodiazepines) are more likely contributors to fatigue.  I urged continued follow-up with psychiatry and primary care to differentiate  "these potential factors.    RTC 6 mo    Colby Calvo MD  Staff Rheumatologist, FRIDA Bro    On the day of the encounter, a total of 31 minutes was spent in chart review, and in counseling and coordination of care, regarding the patient's complex medical problem of uveitis, right eye, high risk medication.    The longitudinal plan of care for the diagnosis(es)/condition(s) as documented were addressed during this visit. Due to the added complexity in care, I will continue to support Debi in the subsequent management and with ongoing continuity of care.             History of Present Illness:   Debi Reed presents for follow-up of iritis.  She was last seen in January 2024, when inadequately controlled iritis was present.  Recommendation was to increase methotrexate to maximum dose, and to consider combination therapy.    Detailed ophthalmologic history is recorded in the note from Dr. Rich on March 11, 2024:    \"...Her ophthalmic history began in 7/01/2010 with cataract surgery in the left eye performed by Dr. Lombardi of [facility]. She had a retained lens fragment which was removed 1 week postoperatively on 7/8/210. She notes there may have been ocular hypertension in that eye, and describes needing a procedure to reduce the pressure with a needle. Shortly thereafter, she reports experiencing a retinal tear in the left eye which was repaired by laser on 7/19/2010 which was further complicated by retinal detachment. This was repaired by another surgeon at [facility] (Dr. Sukhwinder Langley to pts recollection, records unavailable)     She underwent cataract surgery in the right eye on 11/18/2010. She also recalls having a retinal detachment in the right eye which was repaired by laser retinopexy with Dr. Saeed, of [facility] at the time to her recollection.     She does not recall having had a diagnosis of uveitis at or before the time of her cataract surgery. Uveitis has been restricted to the right eye. The " "documentation she provides from [facility] dates back to 2012 and indicates recurrent episodes of blurry vision with anterior chamber cell and flare and elevated right sided pressures up to mid 20s in the right eye. This was treated at first with durezol and combigan and more recently with prednisolone. Examinations indicate her PCIOL was consistently well centered in each eye.      Flares begin with a feeling of dry eye followed by dark grey translucent material in her right eye vision most noticeable when she looks at lights, sometimes there is pain when she looks at lights. When this occurs she will self treat with prednisolone, tropicamide. If the pain is severe she will got to the eye clinic and usually starts combigan. She has experienced about 15 flares this year.     Recent flare in December 2023 was so severe that it would not respond to her normal regimen of prednisolone drops. She was unable to work. She saw Dr. Lombardi and received a steroid injection (kenalog injection on 1/2/2024) to the right eye in conjunction with steroid drops finally this got better. She did have issues with elevated intraocular pressure....\"      Interval history September 4, 2024:    Patient was last seen by Dr. Rich on July 15, 2024.  Impression was of recurrent iritis of right eye, improved but still active despite methotrexate use.  Recommendation was to continue oral methotrexate and to use a 2-week course of prednisolone drops.    Today, she notes that she has continued to have \"flares\" of iritis. She notes \"cottony cloud\" over the R ey feet options There is also increased intraocular pressure.    Feet are stiff in the mornings, and her skin is \"reactive\" to scratches and other minor trauma.    She takes methotrexate 8 tabs weekly since June 2024. She is tired a lot, and wonders whether methotrexate is contributing. She notes possible association with methotrexate of fatigue.     She does note that sleep is " "generally not refreshing; she is tired upon awakening, and she is ready to fall asleep at the conclusion of each day. She has discussed with psychiatry, and there has been discussion about \"uppers\" and \"downers\"    Initial history June 6, 2024     Patient was seen by Dr. Rich in May 2024.  Impression was of recurrent iritis.  Right eye iritis was judged active.  History of recent methotrexate ramp-up was reviewed. recommendation was to continue methotrexate 15 mg weekly.     She has had iritis in R eye for \"more than 10 years\"  She had been managed with topicals until 2023.  She had \"whiteout\" of r eye vision in late 2023; had intraocular injection and iincreased topicals Rx.  She learned from Dr. Rich in 3-2024 that cells were still present despite visual improvement.  Methotrexate  She had another flare 2weeks ago, worsening right after followup with Ophtho.      R leg and foot swelled with pain and pitting edema: DVT workup negative. Skin was sensitive. Concern was for EN given skin discoloration, non-traumatic bruising on R leg.  Notes intermittent itchy rashes on her hands, possibly associated with frequent hand-washing at work.     + hot flash type symptoms  + bladder and bowel urgency intermittent. Occasional 1-2 days of liquid stools, sudden onset 2 times monthly. No blood/+ mucus.                Review of Systems:     Constitutional: negative  Skin: negative  Eyes: negative  Ears/Nose/Throat: negative  Respiratory: No shortness of breath, dyspnea on exertion, cough, or hemoptysis  Cardiovascular: negative  Gastrointestinal: negative  Genitourinary: negative  Musculoskeletal: negative  Neurologic: negative  Psychiatric: negative  Hematologic/Lymphatic/Immunologic: negative  Endocrine: negative         Active Problem List:     Patient Active Problem List    Diagnosis Date Noted    Mixed hyperlipidemia 02/23/2024     Priority: Medium     Formatting of this note might be different from the original.   " Last Lipids:   Chol: 2024 243    293   HDL: 2024 37   Chol/HDL Ratio: 2024 6.57   LDL: 2024 147   LDL DIRECT: . No results found in past 5 years   Non-HDL: 2024 206      Uveitis of right eye 2024     Priority: Medium    Hypothyroidism 2003     Priority: Medium     Hypothyroidism Acquired              Past Medical History:     Past Medical History:   Diagnosis Date    Hypothyroidism     Recurrent acute iridocyclitis of right eye     Sinusitis, chronic      Past Surgical History:   Procedure Laterality Date    CATARACT IOL, RT/LT Bilateral 2010    LASER YAG CAPSULOTOMY Left 2016    Procedure: LASER YAG CAPSULOTOMY;  Surgeon: Obie Lombardi MD;  Location:  [facility]    VITRECTOMY PARSPLANA, SCLERAL BUCKLE, COMBINED Left     YAG CAPSULOTOMY OS (LEFT EYE)              Social History:     Social History     Socioeconomic History    Marital status:      Spouse name: Not on file    Number of children: Not on file    Years of education: Not on file    Highest education level: Not on file   Occupational History    Not on file   Tobacco Use    Smoking status: Never    Smokeless tobacco: Never   Substance and Sexual Activity    Alcohol use: Not Currently    Drug use: Never    Sexual activity: Not on file   Other Topics Concern    Not on file   Social History Narrative    Not on file     Social Determinants of Health     Financial Resource Strain: Low Risk  (2024)    Received from [facility] [employer] [facility], [facility]    Financial Resource Strain     Difficulty of Paying Living Expenses: 3     Difficulty of Paying Living Expenses: Not on file   Food Insecurity: No Food Insecurity (2024)    Received from [facility] [employer] [facility], [facility]    Food Insecurity     Worried About Running Out of Food in the Last Year: 1   Transportation Needs: No  Transportation Needs (2/23/2024)    Received from [facility] [employer] [facility] [employer] [facility], [facility]    Transportation Needs     Lack of Transportation (Medical): 1   Physical Activity: Not on file   Stress: Not on file   Social Connections: Socially Integrated (2/23/2024)    Received from [facility] [employer] [facility], [facility]    Social Connections     Frequency of Communication with Friends and Family: 0   Interpersonal Safety: Not on file   Housing Stability: Low Risk  (2/23/2024)    Received from [facility] [employer] [facility], [facility]    Housing Stability     Unable to Pay for Housing in the Last Year: 1          Family History:   No family history on file.         Allergies:     Allergies   Allergen Reactions    Morphine Nausea and Other (See Comments)     Auditory hallucinations.    Adhesive Tape Itching and Rash    [place] Rash    Latex Rash    Nickel Rash            Medications:     Current Outpatient Medications   Medication Sig Dispense Refill    acetaminophen (TYLENOL) 500 MG tablet Take 1,000 mg by mouth as needed for pain.      brimonidine-timolol (COMBIGAN) 0.2-0.5 % ophthalmic solution Place 1 drop into the right eye 2 times daily (Patient taking differently: Place 1 drop into the right eye as needed (for flare ups).) 5 mL 5    clonazePAM (KLONOPIN) 1 MG tablet Take 1-2 tablets (1-2 mg) by mouth nightly as needed for anxiety 10 tablet 0    folic acid (FOLVITE) 1 MG tablet Take 1 tablet (1 mg) by mouth daily 60 tablet 2    ibuprofen (ADVIL/MOTRIN) 200 MG capsule Take by mouth as needed.      Levothyroxine Sodium (SYNTHROID PO) Take 200 mcg by mouth daily      LORazepam (ATIVAN PO) Take 0.5 mg by mouth. 1 - 3 tablets x / day      meclizine (ANTIVERT) 25 MG tablet Take 25 mg by mouth as needed      Methotrexate 2 MG/ML SOLN       methotrexate 2.5 MG tablet Take 8  tablets (20 mg) by mouth every 7 days Please also take Folic Acid vitamin daily 96 tablet 3    ondansetron (ZOFRAN ODT) 4 MG ODT tab Place 4 mg under the tongue as needed      prednisoLONE acetate (PRED FORTE) 1 % ophthalmic suspension Place 1 drop into the right eye daily (Patient taking differently: Place 1 drop into the right eye as needed.) 5 mL 4    Sertraline HCl (ZOLOFT PO) Take 200 mg by mouth      ClonazePAM (KLONOPIN PO) Take 1 mg by mouth at bedtime (Patient not taking: Reported on 9/5/2024)       She has used stable dosing of clonazepam for decades.       Physical Exam:   Blood pressure 103/68, pulse 62, resp. rate 16, SpO2 97%.  Wt Readings from Last 6 Encounters:   06/06/24 88 kg (194 lb)   01/01/20 77.1 kg (170 lb)     There is no height or weight on file to calculate BMI.  Constitutional: well-developed, appearing stated age; cooperative  Eyes: nl EOM, PERRLA, vision, conjunctiva, sclera  ENT: nl external ears, nose, hearing, lips, teeth, gums, throat  No mucous membrane lesions, normal saliva pool  Neck: no mass or thyroid enlargement  Resp: Breathing is unlabored.  Lymph: no cervical, supraclavicular, inguinal or epitrochlear nodes  MS: No synovitis or altered range of motion in the toes, mid feet, or ankles.  Skin: Excoriated papules over the anterior ankles and linear eschar over the legs suggestive of prior trauma/scratching  Neuro: nl cranial nerves, strength, sensation, DTRs.   Psych: nl judgement, orientation, memory, affect.         Data:     @      Latest Ref Rng & Units 6/6/2024     9:37 AM 9/4/2024     1:44 PM   RHEUM RESULTS   Albumin 3.5 - 5.2 g/dL 4.4  4.4    ALT 0 - 50 U/L 39  30    AST 0 - 45 U/L 29  25    ARIS interpretation Negative Negative     Creatinine 0.51 - 0.95 mg/dL 0.61  0.73    CRP Inflammation <5.00 mg/L <3.00  <3.00    GFR Estimate >60 mL/min/1.73m2 >90  >90    Hematocrit 35.0 - 47.0 % 37.9  35.3    Hemoglobin 11.7 - 15.7 g/dL 12.7  12.1    WBC 4.0 - 11.0 10e3/uL 5.0   5.2    RBC Count 3.80 - 5.20 10e6/uL 4.15  3.81    RDW 10.0 - 15.0 % 13.4  13.0    MCHC 31.5 - 36.5 g/dL 33.5  34.3    MCV 78 - 100 fL 91  93    Platelet Count 150 - 450 10e3/uL 193  176        Rheumatoid Factor   Date Value Ref Range Status   06/06/2024 <10 <14 IU/mL Final   ,   Cyclic Citrullinated Peptide Antibody IgG   Date Value Ref Range Status   06/06/2024 1.0 <7.0 U/mL Final     Comment:     Negative   ,  ,  ,   SSA (Ro) Antibody IgG   Date Value Ref Range Status   03/13/2024 Negative Negative Final     SSB (La) Antibody IgG   Date Value Ref Range Status   03/13/2024 Negative Negative Final   ,  ,   ARIS interpretation   Date Value Ref Range Status   06/06/2024 Negative Negative Final     Comment:       Negative:              <1:40  Borderline Positive:   1:40 - 1:80  Positive:              >1:80   ,  ,  ,  ,  ,  ,  ,  ,  ,  ,  ,  ,  ,  ,  ,  ,  ,  ,  ,  ,  ,  ,  ,  ,  ,  ,  ,  ,  ,  ,  ,  ,  ,  ,  ,  ,  ,  ,

## 2024-09-05 ENCOUNTER — OFFICE VISIT (OUTPATIENT)
Dept: RHEUMATOLOGY | Facility: CLINIC | Age: 54
End: 2024-09-05
Attending: OPHTHALMOLOGY
Payer: COMMERCIAL

## 2024-09-05 VITALS
OXYGEN SATURATION: 97 % | SYSTOLIC BLOOD PRESSURE: 103 MMHG | HEART RATE: 62 BPM | DIASTOLIC BLOOD PRESSURE: 68 MMHG | RESPIRATION RATE: 16 BRPM

## 2024-09-05 DIAGNOSIS — H20.021 RECURRENT IRITIS OF RIGHT EYE: ICD-10-CM

## 2024-09-05 LAB
ALBUMIN SERPL BCG-MCNC: 4.4 G/DL (ref 3.5–5.2)
ALT SERPL W P-5'-P-CCNC: 30 U/L (ref 0–50)
AST SERPL W P-5'-P-CCNC: 25 U/L (ref 0–45)
CREAT SERPL-MCNC: 0.73 MG/DL (ref 0.51–0.95)
CRP SERPL-MCNC: <3 MG/L
EGFRCR SERPLBLD CKD-EPI 2021: >90 ML/MIN/1.73M2

## 2024-09-05 PROCEDURE — 99214 OFFICE O/P EST MOD 30 MIN: CPT | Performed by: INTERNAL MEDICINE

## 2024-09-05 PROCEDURE — G2211 COMPLEX E/M VISIT ADD ON: HCPCS | Performed by: INTERNAL MEDICINE

## 2024-09-05 RX ORDER — ACETAMINOPHEN 500 MG
1000 TABLET ORAL PRN
COMMUNITY

## 2024-09-05 RX ORDER — OMEGA-3 FATTY ACIDS/FISH OIL 300-1000MG
CAPSULE ORAL PRN
COMMUNITY

## 2024-09-05 ASSESSMENT — PAIN SCALES - GENERAL: PAINLEVEL: NO PAIN (0)

## 2024-09-05 NOTE — PATIENT INSTRUCTIONS
Diagnosis:  1.  Recurrent uveitis/iritis, right eye, incompletely controlled per ophthalmology.  2.  Fatigue, not likely due to methotrexate.    Plan:  2.  Increase methotrexate to 25 mg (10 tablets) once weekly.  May split the dose if queasiness, stomach upset with full dose. While on methotrexate:   -- Check blood tests every 3-4 months (AST/ALT, Albumin, CBC with platelets)   -- Limit alcohol intake to 2 drinks weekly; use folate 1 mg daily.  --Tylenol 500-1000 mg can be used as needed up to three times daily for nausea/headache associated with dosing.    3.  If higher dose of methotrexate is not associated with improved signs of iritis after 4 to 6 weeks, discussed with ophthalmology whether combination therapy with methotrexate and Humira will be indicated.

## 2024-09-06 ENCOUNTER — OFFICE VISIT (OUTPATIENT)
Dept: OPHTHALMOLOGY | Facility: CLINIC | Age: 54
End: 2024-09-06
Attending: OPHTHALMOLOGY
Payer: COMMERCIAL

## 2024-09-06 DIAGNOSIS — H20.021 RECURRENT IRITIS OF RIGHT EYE: Primary | ICD-10-CM

## 2024-09-06 DIAGNOSIS — Z79.899 HIGH RISK MEDICATION USE: ICD-10-CM

## 2024-09-06 DIAGNOSIS — H40.051 OCULAR HYPERTENSION, RIGHT EYE: ICD-10-CM

## 2024-09-06 PROCEDURE — 99213 OFFICE O/P EST LOW 20 MIN: CPT | Performed by: OPHTHALMOLOGY

## 2024-09-06 ASSESSMENT — REFRACTION_WEARINGRX
OS_ADD: +2.75
OD_AXIS: 010
OD_CYLINDER: +0.50
OD_SPHERE: -2.25
OD_ADD: +2.75
OS_AXIS: 120
OS_CYLINDER: +1.00
OS_SPHERE: -1.00

## 2024-09-06 ASSESSMENT — VISUAL ACUITY
OS_CC+: -2
OD_PH_CC+: +2
OD_PH_CC: 20/30
METHOD: SNELLEN - LINEAR
OD_CC: 20/30
OD_CC+: +1
OS_CC: 20/20
CORRECTION_TYPE: GLASSES

## 2024-09-06 ASSESSMENT — CONF VISUAL FIELD
OD_INFERIOR_NASAL_RESTRICTION: 0
OS_INFERIOR_NASAL_RESTRICTION: 0
OD_NORMAL: 1
METHOD: COUNTING FINGERS
OD_SUPERIOR_NASAL_RESTRICTION: 0
OS_SUPERIOR_TEMPORAL_RESTRICTION: 3
OD_SUPERIOR_TEMPORAL_RESTRICTION: 0
OS_INFERIOR_TEMPORAL_RESTRICTION: 0
OS_SUPERIOR_NASAL_RESTRICTION: 0
OD_INFERIOR_TEMPORAL_RESTRICTION: 0

## 2024-09-06 ASSESSMENT — EXTERNAL EXAM - RIGHT EYE: OD_EXAM: NORMAL

## 2024-09-06 ASSESSMENT — SLIT LAMP EXAM - LIDS
COMMENTS: NORMAL
COMMENTS: NORMAL

## 2024-09-06 ASSESSMENT — CUP TO DISC RATIO
OD_RATIO: 0.55
OS_RATIO: 0.5

## 2024-09-06 ASSESSMENT — TONOMETRY
OS_IOP_MMHG: 18
IOP_METHOD: TONOPEN
OD_IOP_MMHG: 20

## 2024-09-06 ASSESSMENT — EXTERNAL EXAM - LEFT EYE: OS_EXAM: NORMAL

## 2024-09-06 NOTE — PROGRESS NOTES
Chief Complaint/Presenting Concern:  Uveitis follow up    Interval History of Present Ocular Illness:  Debi Reed is a 53 year old patient who returns for follow up of her iritis and ocular hypertension. Last visit we elected to use drops for a few more weeks.    Debi saw Dr. Lombardi who did see some inflammation and recommended restarting drops twice daily. She was scheduled to return but given other scheduling issues, was not able to follow up bud did stop the drop a few weeks ago ahead of this visit. The left eye seems to be tired and achy perhaps due to working harder but no true pain.     Interval Updates to Medical/Family/Social History:    Has been taking care of Mom  Debi saw Dr. Calvo yesterday. There was no systemic inflammatory disease identified. He felt the other medications may be contributing more to fatigue. He was opened to the idea of a higher dose of methotrexate and possibly adding a biologic such as Humira.     Relevant Review of Systems Updates:  Still fatigued     Current eye related medications:  Oral methotrexate 20 mg (8 tablets) weekly, folic acid daily, No Prednisolone nor Combigan for at least two weeks    Labs 9/4/24: Normal/negative: ESR, Creatinine, AST, ALT, CBC    Retina/Uveitis Imaging: None today    Assessment:     1. Recurrent iritis of right eye  Still a few cells today but improved    2. Ocular hypertension, right eye  Upper normal off Combigan.     3. High risk medication use  Oral methotrexate 20 mg (8 tablets) weekly    Plan/Recommendations:    Discussed findings with patient. There are a few cells in the right eye off drops a few weeks. This is better than last visit but still active on this dose of methotrexate and off steroid drops. We discussed the level of fatigue on methotrexate but that we may consider a higher dose before considering adding a medication such as Humira  There are two single cells in the left eye but this can be observed.  Eye pressure  is normal in each eye off Combigan right eye.   Recommend additional testing: None needed   Continue the methotrexate but let's increase the dose to 25 mg (10 pills) on the same day each week starting tomorrow. We will ask Dr. Calvo about updating the dose for refills  For the Folic acid, let's increase the dose to 2 mg each day to see if this can help with fatigue.   Let's restart drops (Prednisolone and Combigan) every other day in the right eye only  No drops needed left eye    Regarding Adalimumab (Humira), okay to hold off for now but we may consider for next time if iritis not better and or more fatigued    [facility]  Layla 9/30 as scheduled here. Tonopen, no dilation, no testing    No scheduled visit with Dr. Lombardi needed at this time    Physician Attestation     Attending Physician Attestation:  Complete documentation of historical and exam elements from today's encounter can be found in the full encounter summary report (not reduplicated in this progress note). I personally obtained the chief complaint(s) and history of present illness. I confirmed and edited as necessary the review of systems, past medical/surgical history, family history, social history, and examination findings as documented by others; and I examined the patient myself. I personally reviewed the relevant tests, images, and reports as documented above. I formulated and edited as necessary the assessment and plan and discussed the findings and management plan with the patient and family members present at the time of this visit.  Dheeraj Rich M.D., Uveitis and Medical Retina, September 6, 2024

## 2024-09-06 NOTE — NURSING NOTE
Chief Complaints and History of Present Illnesses   Patient presents with    Follow Up     7 week follow up Recurrent iritis of right eye     Chief Complaint(s) and History of Present Illness(es)       Follow Up              Comments: 7 week follow up Recurrent iritis of right eye              Comments    Pt states vision is about the same as last visit. Eyes feel about the same. Left eye gets tired and achy.  New floaters in RE> LE that come and go.    FEMI Peña September 6, 2024 7:50 AM

## 2024-09-06 NOTE — PATIENT INSTRUCTIONS
Continue the methotrexate but let's increase the dose to 25 mg (10 pills) on the same day each week starting tomorrow. We will ask Dr. Calvo about updating the dose for refills  For the Folic acid, let's increase the dose to 2 mg each day to see if this can help with fatigue.   Let's restart drops (Prednisolone and Combigan) every other day in the right eye only  No drops needed left eye    Regarding Adalimumab (Humira), okay to hold off for now but we may consider for next time if iritis not better and or more fatigued

## 2024-09-06 NOTE — LETTER
9/6/2024       RE: Debi Reed  [address]  [facility] [address]     Dear Colleague,    Thank you for referring your patient, Debi Reed, to the [facility] [place] at [facility]. Please see a copy of my visit note below.    Chief Complaint/Presenting Concern:  Uveitis follow up    Interval History of Present Ocular Illness:  Debi Reed is a 53 year old patient who returns for follow up of her iritis and ocular hypertension. Last visit we elected to use drops for a few more weeks.    Debi saw Dr. Lombardi who did see some inflammation and recommended restarting drops twice daily. She was scheduled to return but given other scheduling issues, was not able to follow up bud did stop the drop a few weeks ago ahead of this visit. The left eye seems to be tired and achy perhaps due to working harder but no true pain.     Interval Updates to Medical/Family/Social History:    Has been taking care of Mom  Debi saw Dr. Calvo yesterday. There was no systemic inflammatory disease identified. He felt the other medications may be contributing more to fatigue. He was opened to the idea of a higher dose of Methotrexate and possibly adding a biologic such as Humira.     Relevant Review of Systems Updates:  Still fatigued     Current eye related medications:  Oral methotrexate 20 mg (8 tablets) weekly, folic acid daily, No Prednisolone nor Combigan for at least two weeks    Labs 9/4/24: Normal/negative: ESR, Creatinine, AST, ALT, CBC    Retina/Uveitis Imaging: None today      Assessment:     1. Recurrent iritis of right eye  Still a few cells today but improved    2. Ocular hypertension, right eye  Upper normal off Combigan.     3. High risk medication use  Oral Methotrexate 20 mg (8 tablets) weekly    Plan/Recommendations:    Discussed findings with patient. There are a few cells in the right eye off drops a few weeks. This is  better than last visit but still active on this dose of methotrexate and off steroid drops. We discussed the level of fatigue on Methotrexate but that we may consider a higher dose before considering adding a medication such as Humira  There are two single cells in the left eye but this can be observed.  Eye pressure is normal in each eye off Combigan right eye.   Recommend additional testing: None needed   Continue the Methotrexate but let's increase the dose to 25 mg (10 pills) on the same day each week starting tomorrow. We will ask Dr. Calvo about updating the dose for refills  For the Folic acid, let's increase the dose to 2 mg each day to see if this can help with fatigue.   Let's restart drops (Prednisolone and Combigan) every other day in the right eye only  No drops needed left eye    Regarding Adalimumab (Humira), okay to hold off for now but we may consider for next time if iritis not better and or more fatigued    [facility]  Layla 9/30 as scheduled here. Tonopen, no dilation, no testing    No scheduled visit with Dr. Lombardi needed at this time    Physician Attestation    Attending Physician Attestation:  Complete documentation of historical and exam elements from today's encounter can be found in the full encounter summary report (not reduplicated in this progress note). I personally obtained the chief complaint(s) and history of present illness. I confirmed and edited as necessary the review of systems, past medical/surgical history, family history, social history, and examination findings as documented by others; and I examined the patient myself. I personally reviewed the relevant tests, images, and reports as documented above. I formulated and edited as necessary the assessment and plan and discussed the findings and management plan with the patient and family members present at the time of this visit.  Dheeraj Rich M.D., Uveitis and Medical Retina, September 6, 2024     Again, thank you for  allowing me to participate in the care of your patient.      Sincerely,    Dheeraj Rich MD  Uveitis and Medical Retina    Department of Ophthalmology & Visual Neurosciences  [facility]

## 2024-09-12 ENCOUNTER — MYC MEDICAL ADVICE (OUTPATIENT)
Dept: RHEUMATOLOGY | Facility: CLINIC | Age: 54
End: 2024-09-12
Payer: COMMERCIAL

## 2024-09-12 DIAGNOSIS — H20.021 RECURRENT IRITIS OF RIGHT EYE: Primary | ICD-10-CM

## 2024-09-13 RX ORDER — METHOTREXATE 2.5 MG/1
25 TABLET ORAL
Qty: 120 TABLET | Refills: 3 | Status: SHIPPED | OUTPATIENT
Start: 2024-09-13

## 2024-09-13 NOTE — TELEPHONE ENCOUNTER
See MyCLIFEMODELERt message from the patient.  Per your recommendation and Dr Rich's recommendation, patient will increase her methotrexte from 20mg to 25mg weekly.  New RX is pended.     Catherine Verdin RN

## 2024-09-30 ENCOUNTER — OFFICE VISIT (OUTPATIENT)
Dept: OPHTHALMOLOGY | Facility: CLINIC | Age: 54
End: 2024-09-30
Attending: OPHTHALMOLOGY
Payer: COMMERCIAL

## 2024-09-30 DIAGNOSIS — H40.051 OCULAR HYPERTENSION, RIGHT EYE: ICD-10-CM

## 2024-09-30 DIAGNOSIS — Z79.899 HIGH RISK MEDICATION USE: ICD-10-CM

## 2024-09-30 DIAGNOSIS — H20.021 RECURRENT IRITIS OF RIGHT EYE: Primary | ICD-10-CM

## 2024-09-30 PROCEDURE — 99213 OFFICE O/P EST LOW 20 MIN: CPT | Performed by: OPHTHALMOLOGY

## 2024-09-30 PROCEDURE — 99214 OFFICE O/P EST MOD 30 MIN: CPT | Performed by: OPHTHALMOLOGY

## 2024-09-30 ASSESSMENT — EXTERNAL EXAM - LEFT EYE: OS_EXAM: NORMAL

## 2024-09-30 ASSESSMENT — VISUAL ACUITY
OD_CC+: +1
CORRECTION_TYPE: GLASSES
OS_CC: 20/25
OD_CC: 20/30
METHOD: SNELLEN - LINEAR
OS_CC+: +2

## 2024-09-30 ASSESSMENT — REFRACTION_WEARINGRX
OD_ADD: +2.75
OS_SPHERE: -1.00
OD_CYLINDER: +0.50
OD_AXIS: 010
OS_CYLINDER: +1.00
OS_ADD: +2.75
OD_SPHERE: -2.25
OS_AXIS: 120

## 2024-09-30 ASSESSMENT — CONF VISUAL FIELD
METHOD: COUNTING FINGERS
OS_NORMAL: 1
OS_SUPERIOR_TEMPORAL_RESTRICTION: 0
OD_SUPERIOR_NASAL_RESTRICTION: 0
OD_INFERIOR_NASAL_RESTRICTION: 0
OD_NORMAL: 1
OS_INFERIOR_NASAL_RESTRICTION: 0
OD_SUPERIOR_TEMPORAL_RESTRICTION: 0
OD_INFERIOR_TEMPORAL_RESTRICTION: 0
OS_INFERIOR_TEMPORAL_RESTRICTION: 0
OS_SUPERIOR_NASAL_RESTRICTION: 0

## 2024-09-30 ASSESSMENT — SLIT LAMP EXAM - LIDS
COMMENTS: NORMAL
COMMENTS: NORMAL

## 2024-09-30 ASSESSMENT — TONOMETRY
OS_IOP_MMHG: 16
IOP_METHOD: TONOPEN
OD_IOP_MMHG: 17

## 2024-09-30 ASSESSMENT — EXTERNAL EXAM - RIGHT EYE: OD_EXAM: NORMAL

## 2024-09-30 ASSESSMENT — CUP TO DISC RATIO
OS_RATIO: 0.5
OD_RATIO: 0.55

## 2024-09-30 NOTE — NURSING NOTE
Chief Complaints and History of Present Illnesses   Patient presents with    Iritis Follow Up     Chief Complaint(s) and History of Present Illness(es)       Iritis Follow Up              Laterality: right eye    Onset: gradual    Onset: months ago    Quality: States va is the same since last visit      Severity: moderate    Frequency: intermittently    Associated symptoms: dryness, photophobia, flashes (when eyes are closed) and floaters.  Negative for redness and tearing    Pain scale: 0/10              Comments    Here for recurrent iritis of right eye  Prednisolone every other day right eye  Combigan every other day right eye   AT  Methotrexate  Folic acid  Ni Ojeda COT 10:34 AM September 30, 2024

## 2024-09-30 NOTE — LETTER
9/30/2024       RE: Debi Reed  [address]  [facility] [address]     Dear Colleague,    Thank you for referring your patient, Debi Reed, to the [facility] [place] at [facility]. Please see a copy of my visit note below.    Chief Complaint/Presenting Concern:  Uveitis follow up    Interval History of Present Ocular Illness:  Debi Reed is a 54 year old patient who returns for follow up of her iritis. Last visit, we discussed continuing methotrexate but increasing the dose to 25 mg (10 pills) weekly and restarting drops right eye only.    Debi feels like things are about the same on this regimen. She has floaters at times in each eye     Interval Updates to Medical/Family/Social History:  No major changes    Relevant Review of Systems Updates:  Mild nausea with methotrexate, fatigue about the same/no worse even with more folic acid    Labs: None new     Current eye related medications: Oral methotrexate 25 mg (10 tablets) weekly, folic acid 1 mg daily and 2 mg daily around weekends when methotrexate taken, Prednisolone and Combigan both every other day right eye only, no drops left eye     Retina/Uveitis Imaging: None today    Assessment:     1. Recurrent iritis of right eye  Slightly more active inflammation today    2. Ocular hypertension, right eye  Normal    3. High risk medication use  Oral methotrexate now 25 mg (10 pills) weekly for the last 3 weeks    Plan/Recommendations:    Discussed findings with patient. Now on full dose (25 mg) weekly methotrexate for the last 3 weeks and back on drops every other day, there is still some active and perhaps a slight increase in inflammation. We discussed that this may be related to initial increase in methotrexate dose, so we should modify drops slightly  There are still two cells left eye. The floaters are unlikely to represent iritis symptoms and we can observe the  left eye without drops   Eye pressure is 17,16. This is improved in each eye   Recommend additional testing: None at this time  For the drops right eye, let's go back to once daily of prednisolone and once daily Combigan and continue until next visit   No drops left eye  For the methotrexate, let's continue the 10 pills (25 mg) each week and folic acid 1-2 mg daily. There is no urgency for biologic at this time but we will reassess soon.     RTC 1 month tonopen AC Check, dilate, no testing.     No set urgency for Dr. Lombardi visit    Attending Physician Attestation:  Complete documentation of historical and exam elements from today's encounter can be found in the full encounter summary report (not reduplicated in this progress note). I personally obtained the chief complaint(s) and history of present illness. I confirmed and edited as necessary the review of systems, past medical/surgical history, family history, social history, and examination findings as documented by others; and I examined the patient myself. I personally reviewed the relevant tests, images, and reports as documented above. I formulated and edited as necessary the assessment and plan and discussed the findings and management plan with the patient and family members present at the time of this visit. Dheeraj Rich M.D., Uveitis and Medical Retina, September 30, 2024       Again, thank you for allowing me to participate in the care of your patient.      Sincerely,    Dheeraj Rich MD  Uveitis and Medical Retina    Department of Ophthalmology & Visual Neurosciences  [facility]

## 2024-09-30 NOTE — PATIENT INSTRUCTIONS
For the drops right eye, let's go back to once daily of prednisolone and once daily Combigan and continue until next visit   No drops left eye  For the methotrexate, let's continue the 10 pills (25 mg) each week and folic acid 1-2 mg daily. There is no urgency for biologic at this time but we will reassess soon.   No set urgency for check in back with Dr. Lombardi

## 2024-09-30 NOTE — PROGRESS NOTES
Chief Complaint/Presenting Concern:  Uveitis follow up    Interval History of Present Ocular Illness:  Debi Reed is a 54 year old patient who returns for follow up of her iritis. Last visit, we discussed continuing methotrexate but increasing the dose to 25 mg (10 pills) weekly and restarting drops right eye only.    Debi feels like things are about the same on this regimen. She has floaters at times in each eye     Interval Updates to Medical/Family/Social History:  No major changes    Relevant Review of Systems Updates:  Mild nausea with methotrexate, fatigue about the same/no worse even with more folic acid    Labs: None new     Current eye related medications: Oral methotrexate 25 mg (10 tablets) weekly, folic acid 1 mg daily and 2 mg daily around weekends when methotrexate taken, Prednisolone and Combigan both every other day right eye only, no drops left eye     Retina/Uveitis Imaging: None today    Assessment:     1. Recurrent iritis of right eye  Slightly more active inflammation today    2. Ocular hypertension, right eye  Normal    3. High risk medication use  Oral methotrexate now 25 mg (10 pills) weekly for the last 3 weeks    Plan/Recommendations:    Discussed findings with patient. Now on full dose (25 mg) weekly methotrexate for the last 3 weeks and back on drops every other day, there is still some active and perhaps a slight increase in inflammation. We discussed that this may be related to initial increase in methotrexate dose, so we should modify drops slightly  There are still two cells left eye. The floaters are unlikely to represent iritis symptoms and we can observe the left eye without drops   Eye pressure is 17,16. This is improved in each eye   Recommend additional testing: None at this time  For the drops right eye, let's go back to once daily of prednisolone and once daily Combigan and continue until next visit   No drops left eye  For the methotrexate, let's continue the 10  pills (25 mg) each week and folic acid 1-2 mg daily. There is no urgency for biologic at this time but we will reassess soon.     RTC 1 month jann AC Check, dilate, no testing.     No set urgency for Dr. Lombardi visit    Physician Attestation     Attending Physician Attestation:  Complete documentation of historical and exam elements from today's encounter can be found in the full encounter summary report (not reduplicated in this progress note). I personally obtained the chief complaint(s) and history of present illness. I confirmed and edited as necessary the review of systems, past medical/surgical history, family history, social history, and examination findings as documented by others; and I examined the patient myself. I personally reviewed the relevant tests, images, and reports as documented above. I formulated and edited as necessary the assessment and plan and discussed the findings and management plan with the patient and family members present at the time of this visit.  Dheeraj Rich M.D., Uveitis and Medical Retina, September 30, 2024

## 2024-10-23 ENCOUNTER — OFFICE VISIT (OUTPATIENT)
Dept: OPHTHALMOLOGY | Facility: CLINIC | Age: 54
End: 2024-10-23
Attending: OPHTHALMOLOGY
Payer: COMMERCIAL

## 2024-10-23 DIAGNOSIS — H40.051 OCULAR HYPERTENSION, RIGHT EYE: ICD-10-CM

## 2024-10-23 DIAGNOSIS — Z79.899 HIGH RISK MEDICATION USE: ICD-10-CM

## 2024-10-23 DIAGNOSIS — H20.21 LENS-INDUCED IRIDOCYCLITIS, RIGHT EYE: ICD-10-CM

## 2024-10-23 DIAGNOSIS — H20.00 ACUTE IRITIS OF LEFT EYE: ICD-10-CM

## 2024-10-23 DIAGNOSIS — H20.021 RECURRENT IRITIS OF RIGHT EYE: Primary | ICD-10-CM

## 2024-10-23 PROCEDURE — 92133 CPTRZD OPH DX IMG PST SGM ON: CPT | Performed by: OPHTHALMOLOGY

## 2024-10-23 PROCEDURE — 99214 OFFICE O/P EST MOD 30 MIN: CPT | Mod: GC | Performed by: OPHTHALMOLOGY

## 2024-10-23 PROCEDURE — 99213 OFFICE O/P EST LOW 20 MIN: CPT | Performed by: OPHTHALMOLOGY

## 2024-10-23 PROCEDURE — 76513 OPH US DX ANT SGM US UNI/BI: CPT | Performed by: OPHTHALMOLOGY

## 2024-10-23 RX ORDER — PREDNISOLONE ACETATE 10 MG/ML
SUSPENSION/ DROPS OPHTHALMIC
Qty: 10 ML | Refills: 5 | Status: SHIPPED | OUTPATIENT
Start: 2024-10-23

## 2024-10-23 RX ORDER — BRIMONIDINE TARTRATE AND TIMOLOL MALEATE 2; 5 MG/ML; MG/ML
SOLUTION OPHTHALMIC
Qty: 10 ML | Refills: 5 | Status: SHIPPED | OUTPATIENT
Start: 2024-10-23

## 2024-10-23 RX ORDER — DORZOLAMIDE HCL 20 MG/ML
1 SOLUTION/ DROPS OPHTHALMIC 3 TIMES DAILY
Qty: 10 ML | Refills: 5 | Status: SHIPPED | OUTPATIENT
Start: 2024-10-23

## 2024-10-23 RX ORDER — LATANOPROST 50 UG/ML
1 SOLUTION/ DROPS OPHTHALMIC AT BEDTIME
Qty: 2.5 ML | Refills: 5 | Status: SHIPPED | OUTPATIENT
Start: 2024-10-23

## 2024-10-23 ASSESSMENT — TONOMETRY
IOP_METHOD: TONOPEN
OS_IOP_MMHG: 17
OD_IOP_MMHG: 38
IOP_METHOD: APPLANATION
OD_IOP_MMHG: 44
IOP_METHOD: APPLANATION
OD_IOP_MMHG: 29

## 2024-10-23 ASSESSMENT — CONF VISUAL FIELD
METHOD: COUNTING FINGERS
OS_INFERIOR_NASAL_RESTRICTION: 0
OD_INFERIOR_TEMPORAL_RESTRICTION: 0
OS_SUPERIOR_TEMPORAL_RESTRICTION: 0
OS_SUPERIOR_NASAL_RESTRICTION: 0
OD_INFERIOR_NASAL_RESTRICTION: 0
OS_INFERIOR_TEMPORAL_RESTRICTION: 0
OD_NORMAL: 1
OD_SUPERIOR_NASAL_RESTRICTION: 0
OD_SUPERIOR_TEMPORAL_RESTRICTION: 0
OS_NORMAL: 1

## 2024-10-23 ASSESSMENT — VISUAL ACUITY
OS_CC: 20/20
METHOD: SNELLEN - LINEAR
OD_CC: 20/80
CORRECTION_TYPE: GLASSES
OD_PH_CC: 20/60

## 2024-10-23 ASSESSMENT — EXTERNAL EXAM - LEFT EYE: OS_EXAM: NORMAL

## 2024-10-23 ASSESSMENT — CUP TO DISC RATIO
OS_RATIO: 0.5
OD_RATIO: 0.55

## 2024-10-23 ASSESSMENT — REFRACTION_WEARINGRX
OS_ADD: +2.75
OD_ADD: +2.75
OD_CYLINDER: +0.50
OS_SPHERE: -1.00
OD_AXIS: 010
OS_AXIS: 120
OD_SPHERE: -2.25
OS_CYLINDER: +1.00

## 2024-10-23 ASSESSMENT — SLIT LAMP EXAM - LIDS
COMMENTS: NORMAL
COMMENTS: NORMAL

## 2024-10-23 ASSESSMENT — EXTERNAL EXAM - RIGHT EYE: OD_EXAM: NORMAL

## 2024-10-23 NOTE — LETTER
10/23/2024       RE: Debi Reed  [address]  [facility] [address]     Dear Colleague,    Thank you for referring your patient, Debi Reed, to the [facility] [place] at [facility]. Please see a copy of my visit note below.    Chief Complaint/Presenting Concern:  Uveitis add on    Interval History of Present Ocular Illness:  Debi Reed is a 54 year old patient who returns for urgent visit for her iritis of the right eye. She mentioned flare symptoms and we recommended a visit today. Debi has been taking methotrexate pills and increased steroid drop frequency yesterday. Left eye feels tired.    Interval Updates to Medical/Family/Social History:    Stressful day recently related to care of mother    Relevant Review of Systems Updates:  No other changes     Current eye related medications: Prednisolone every few hours since yesterday, Combigan daily right eye, Tropicamide right eye once, no drops left eye, methotrexate 10 pills (25 mg) each week and folic acid 1-2 mg daily.     Retina/Uveitis Imaging:  OCT Macula. October 23, 2024  Right eye: Few vitreous opacities, myopic macular contour, no fluid, thin choroid. Stable   Left eye: Myopic macular contour, no fluid, thin choroid. Stable     RNFL October 23, 2024  Right eye Avg thickness 70 microns, thin inf eduardo and sup temp, borderline sup nasal, interval thin inf temp?Media haze  Left eye: Avg thickness 68 microns, stable thin sup and nasal      UBM October 23, 2024  Right eye: Normal cornea, hyperechoic anterior chamber cells, iris flat. Superior sulcus hyperechogenic material , like IOL-iris touch superior/nasal    Assessment:     1. Recurrent iritis of right eye (Primary)  2. Lens-induced iridocyclitis, right eye  Very active today with mostly pigment and some anterior vitreous cell. Iris transillumination defects are present. UBM shows IOL-iris touch superiorly and  nasally.    3. Ocular hypertension, right eye  IOP 44 on entry, improved to 29 after IOP lowering drops    4. Acute iritis of left eye  Few cells    5. High risk medication use  Oral methotrexate 10 pills (25 mg) each week and folic acid 1-2 mg daily.     Plan/Recommendations:    Discussed findings with patient. Exam and imaging today do show evidence of uveitis-glaucoma-(although no hyphema) which may be to least partially related to the iritis. We discussed consideration of Retina Surgery for consideration lens explant and scleral fixated IOL placement and should continue the methotrexate and modify drops  For the left eye, there are a few more cells today and some symptoms. We will carefully add drops for the left eye.   Eye pressure is elevated today to 44 but improved to 29 with IOP lowering drops. We will modify as below  Recommend additional testing: none  Continue these medications: methotrexate 10 pills (25mg) weekly, folic avid 1-2mg daily. Given there may also be benefit for the left eye   No pilocarpine drops right eye given small risk of retinal tears  For the right eye, continue prednisolone 6x/day. Increase Combigan to 3x/day, add Dorzolamide 3x/day right eye and Latanoprost at night   For the left eye, let's do Prednisolone once daily and Combigan once daily    RTC     [place] 1 week applanate no dilation, no testing and Kooze same day if possible for Yamane IOL eval. If not, Kooze in next 2-3 weeks    Allegra Frias MD  Vitreoretinal Surgery Fellow     Attending Physician Attestation:  Complete documentation of historical and exam elements from today's encounter can be found in the full encounter summary report (not reduplicated in this progress note). I personally obtained the chief complaint(s) and history of present illness. I confirmed and edited as necessary the review of systems, past medical/surgical history, family history, social history, and examination findings as documented by others;  and I examined the patient myself. I personally reviewed the relevant tests, images, and reports as documented above. I formulated and edited as necessary the assessment and plan and discussed the findings and management plan with the patient and family.  Dheeraj Rich MD.        Again, thank you for allowing me to participate in the care of your patient.      Sincerely,    Dheeraj Rich MD  Uveitis and Medical Retina    Department of Ophthalmology & Visual Neurosciences  [facility]

## 2024-10-23 NOTE — NURSING NOTE
Chief Complaints and History of Present Illnesses   Patient presents with    Iritis Follow Up     Chief Complaint(s) and History of Present Illness(es)       Iritis Follow Up              Laterality: right eye    Onset: gradual    Onset: months ago    Quality: Blurry/snow va in the right eye    Severity: moderate    Frequency: intermittently    Associated symptoms: dryness, photophobia and floaters.  Negative for eye pain, tearing, headache and flashes    Pain scale: 0/10              Comments    Here for recurrent iritis of right eye  Flare started yesterday in the right eye   Prednisolone every day right eye   Combigan every day right eye   AT  Methotrexate   Folic acid  Ni Ojeda COT 1:26 PM October 23, 2024

## 2024-10-23 NOTE — PROGRESS NOTES
Chief Complaint/Presenting Concern:  Uveitis add on    Interval History of Present Ocular Illness:  Debi Reed is a 54 year old patient who returns for urgent visit for her iritis of the right eye. She mentioned flare symptoms and we recommended a visit today. Debi has been taking methotrexate pills and increased steroid drop frequency yesterday. Left eye feels tired.    Interval Updates to Medical/Family/Social History:    Stressful day recently related to care of Mother    Relevant Review of Systems Updates:  No other changes     Current eye related medications: Prednisolone every few hours since yesterday, Combigan daily right eye, Tropicamide right eye once, no drops left eye, methotrexate 10 pills (25 mg) each week and folic acid 1-2 mg daily.     Retina/Uveitis Imaging:  OCT Macula. October 23, 2024  Right eye: Few vitreous opacities, myopic macular contour, no fluid, thin choroid. Stable   Left eye: Myopic macular contour, no fluid, thin choroid. Stable     RNFL October 23, 2024  Right eye Avg thickness 70 microns, thin inf eduardo and sup temp, borderline sup nasal, interval thin inf temp?Media haze  Left eye: Avg thickness 68 microns, stable thin sup and nasal    UBM October 23, 2024  Right eye: Normal cornea, hyperechoic anterior chamber cells, iris flat. Superior sulcus hyperechogenic material , like IOL-iris touch superior/nasal    Assessment:     1. Recurrent iritis of right eye (Primary)  2. Lens-induced iridocyclitis, right eye  Very active today with mostly pigment and some anterior vitreous cell. Iris transillumination defects are present. UBM shows IOL-iris touch superiorly and nasally.    3. Ocular hypertension, right eye  IOP 44 on entry, improved to 29 after IOP lowering drops    4. Acute iritis of left eye  Few cells    5. High risk medication use  Oral methotrexate 10 pills (25 mg) each week and folic acid 1-2 mg daily.     Plan/Recommendations:    Discussed findings with patient. Exam  and imaging today do show evidence of uveitis-glaucoma-(although no hyphema) which may be to least partially related to the iritis. We discussed consideration of Retina Surgery for consideration lens explant and scleral fixated IOL placement and should continue the methotrexate and modify drops  For the left eye, there are a few more cells today and some symptoms. WE will carefully add drops for the left eye.   Eye pressure is elevated today to 44 but improved to 29 with IOP lowering drops. We will modify as below  Recommend additional testing: none  Continue these medications: methotrexate 10 pills (25mg) weekly, folic avid 1-2mg daily. Given there may also be benefit for the left eye   No pilocarpine drops right eye given small risk of retinal tears  For the right eye, continue prednisolone 6x/day. Increase Combigan to 3x/day, add Dorzolamide 3x/day right eye and Latanoprost at night   For the left eye, let's do Prednisolone once daily and Combigan once daily    RTC     Layla 1 week applanate no dilation, no testing and Kooze same day if possible for Yamane IOL eval. If not, Kooze in next 2-3 weeks    Allegra Frias MD  Vitreoretinal Surgery Fellow     Attending Physician Attestation:  Complete documentation of historical and exam elements from today's encounter can be found in the full encounter summary report (not reduplicated in this progress note). I reviewed the chief complaint(s) and history of present illness, and  confirmed and edited as necessary the review of systems, past medical/surgical history, family history, social history, and examination findings as documented by others and the treating Resident or Fellow Physician.    I examined the patient myself, discussed the findings, reviewed all ancillary testing data and modified these results and reports along with the assessment and plan with the Treating Resident or Fellow Physician. I agree with the note as detailed above.   Dheeraj Rich,  M.D.  Uveitis and Medical Retina  October 23, 2024

## 2024-10-23 NOTE — PATIENT INSTRUCTIONS
Continue these medications: methotrexate 10 pills (25mg) weekly, folic avid 1-2mg daily. Given there may also be benefit for the left eye   No pilocarpine drops right eye given small risk of retinal tears  For the right eye, continue prednisolone 6x/day. Increase Combigan to 3x/day, add Dorzolamide 3x/day right eye and Latanoprost at night   For the left eye, let's do Prednisolone once daily and Combigan once daily

## 2024-10-25 DIAGNOSIS — H20.021 RECURRENT IRITIS OF RIGHT EYE: Primary | ICD-10-CM

## 2024-10-30 ENCOUNTER — OFFICE VISIT (OUTPATIENT)
Dept: OPHTHALMOLOGY | Facility: CLINIC | Age: 54
End: 2024-10-30
Attending: OPHTHALMOLOGY
Payer: COMMERCIAL

## 2024-10-30 DIAGNOSIS — H20.00 ACUTE IRITIS OF LEFT EYE: ICD-10-CM

## 2024-10-30 DIAGNOSIS — H20.21 LENS-INDUCED IRIDOCYCLITIS, RIGHT EYE: Primary | ICD-10-CM

## 2024-10-30 DIAGNOSIS — H40.051 OCULAR HYPERTENSION, RIGHT EYE: ICD-10-CM

## 2024-10-30 DIAGNOSIS — Z79.899 HIGH RISK MEDICATION USE: ICD-10-CM

## 2024-10-30 PROCEDURE — 99213 OFFICE O/P EST LOW 20 MIN: CPT | Performed by: OPHTHALMOLOGY

## 2024-10-30 RX ORDER — METHYLPREDNISOLONE 4 MG/1
TABLET ORAL
Qty: 21 TABLET | Refills: 0 | Status: SHIPPED | OUTPATIENT
Start: 2024-10-30 | End: 2024-11-11

## 2024-10-30 ASSESSMENT — VISUAL ACUITY
METHOD: SNELLEN - LINEAR
CORRECTION_TYPE: GLASSES
OD_PH_CC: 20/30
OD_CC+: +2
OD_CC: 20/50
OS_CC+: +2
OS_CC: 20/25

## 2024-10-30 ASSESSMENT — CONF VISUAL FIELD
OD_SUPERIOR_TEMPORAL_RESTRICTION: 0
OD_NORMAL: 1
OS_SUPERIOR_TEMPORAL_RESTRICTION: 3
METHOD: COUNTING FINGERS
OD_INFERIOR_TEMPORAL_RESTRICTION: 0
OD_INFERIOR_NASAL_RESTRICTION: 0
OD_SUPERIOR_NASAL_RESTRICTION: 0

## 2024-10-30 ASSESSMENT — SLIT LAMP EXAM - LIDS
COMMENTS: NORMAL
COMMENTS: NORMAL

## 2024-10-30 ASSESSMENT — EXTERNAL EXAM - LEFT EYE: OS_EXAM: NORMAL

## 2024-10-30 ASSESSMENT — REFRACTION_WEARINGRX
OS_SPHERE: -1.00
OS_AXIS: 120
OS_ADD: +2.75
OD_SPHERE: -2.25
OD_AXIS: 010
OS_CYLINDER: +1.00
OD_CYLINDER: +0.50
SPECS_TYPE: PAL
OD_ADD: +2.75

## 2024-10-30 ASSESSMENT — CUP TO DISC RATIO
OS_RATIO: 0.5
OD_RATIO: 0.55

## 2024-10-30 ASSESSMENT — EXTERNAL EXAM - RIGHT EYE: OD_EXAM: NORMAL

## 2024-10-30 ASSESSMENT — TONOMETRY
IOP_METHOD: APPLANATION
OD_IOP_MMHG: 11
OS_IOP_MMHG: 13

## 2024-10-30 NOTE — PATIENT INSTRUCTIONS
Recommend additional testing: None today  Continue these medications unchanged: Pills: Oral  methotrexate 10 pills (25mg) weekly, folic acid 1-2mg daily. This may still be helping the left eye which does not seem to have lens induced Uveitis  For the right eye, continue the pressure lowering drops the same: Combigan three times daily, Dorzolamide three times daily, Latanoprost at night   For the right eye, please continue 6x/day for one more week. After that (when Medrol dose pack is done), go down to 4x/day until next time.   For the left eye:  let's modify to prednisolone 2x/day, Combigan 2x/day  Add a six day course of medrol dose pack to help more quickly resolve things. Take full day's dose each morning with breakfast.

## 2024-10-30 NOTE — PROGRESS NOTES
Chief Complaint/Presenting Concern:  Uveitis follow up    Interval History of Present Ocular Illness:  Debi Reed is a 54 year old patient who returns for follow up of her iritis. Last time we did imaging which found this was likely related to the lens implant.We modified drops and discussed referral to one of my partners to assess lens implant.     Dbei reports the right eye is tired from all the drops and maybe had a floater few times right eye. Left eye is okay    Interval Updates to Medical/Family/Social History:  No major changes    Relevant Review of Systems Updates:  no updates    Current eye related medications:   Pills: Oral  methotrexate 10 pills (25mg) weekly, folic acid 1-2mg daily.   Right eye: prednisolone six times daily, Combigan three times daily, Dorzolamide three times daily and Latanoprost at night   Left eye: prednisolone daily, Combigan daily     No imaging today:     Assessment:     1. Lens-induced iridocyclitis, right eye (Primary)  Persistent     2. Ocular hypertension, right eye  IOP 11    3. Acute iritis of left eye  Still active     4. High risk medication use  Oral  methotrexate 10 pills (25mg) weekly, folic acid 1-2mg daily.     Plan/Recommendations:    Discussed findings with patient and her Friend. The inflammation in the right eye is stable but pressure improved to 11. Given some ongoing symptoms, we will try a short course of oral steroids, with methotrexate and frequent drops.    There is slightly more iritis and eye pressure is 13 in the left eye.   Recommend additional testing: None today  Continue these medications unchanged: Pills: Oral  methotrexate 10 pills (25mg) weekly, folic acid 1-2mg daily. This may still be helping the left eye which does not seem to have lens induced Uveitis  For the right eye, continue the pressure lowering drops the same: Combigan three times daily, Dorzolamide three times daily, Latanoprost at night   For the right eye, please continue  6x/day for one more week. After that (when Medrol dose pack is done), go down to 4x/day until next time.   For the left eye:  let's modify to prednisolone 2x/day, Combigan 2x/day  Add a six day course of medrol dose pack to help more quickly resolve things. Take full day's dose each morning with breakfast.     RTC 11/11 as scheduled with Layla and Britney     Physician Attestation     Attending Physician Attestation:  Complete documentation of historical and exam elements from today's encounter can be found in the full encounter summary report (not reduplicated in this progress note). I personally obtained the chief complaint(s) and history of present illness. I confirmed and edited as necessary the review of systems, past medical/surgical history, family history, social history, and examination findings as documented by others; and I examined the patient myself. I personally reviewed the relevant tests, images, and reports as documented above. I formulated and edited as necessary the assessment and plan and discussed the findings and management plan with the patient and family members present at the time of this visit.  Dheeraj Rich M.D., Uveitis and Medical Retina, October 30, 2024

## 2024-10-30 NOTE — LETTER
10/30/2024       RE: Debi Reed  [address]  [facility] [address]     Dear Colleague,    Thank you for referring your patient, Debi Reed, to the [facility] [place] at [facility]. Please see a copy of my visit note below.    Chief Complaint/Presenting Concern:  Uveitis follow up    Interval History of Present Ocular Illness:  Debi Reed is a 54 year old patient who returns for follow up of her iritis. Last time we did imaging which found this was likely related to the lens implant.We modified drops and discussed referral to one of my partners to assess lens implant.     Debi reports the right eye is tired from all the drops and maybe had a floater few times right eye. Left eye is okay    Interval Updates to Medical/Family/Social History:  No major changes    Relevant Review of Systems Updates:  no updates    Current eye related medications:   Pills: Oral  methotrexate 10 pills (25mg) weekly, folic acid 1-2mg daily.   Right eye: prednisolone six times daily, Combigan three times daily, Dorzolamide three times daily and Latanoprost at night   Left eye: prednisolone daily, Combigan daily     No imaging today:     Assessment:     1. Lens-induced iridocyclitis, right eye (Primary)  Persistent     2. Ocular hypertension, right eye  IOP 11    3. Acute iritis of left eye  Still active     4. High risk medication use  Oral  methotrexate 10 pills (25mg) weekly, folic acid 1-2mg daily.     Plan/Recommendations:    Discussed findings with patient and her friend. The inflammation in the right eye is stable but pressure improved to 11. Given some ongoing symptoms, we will try a short course of oral steroids, with methotrexate and frequent drops.    There is slightly more iritis and eye pressure is 13 in the left eye.   Recommend additional testing: None today  Continue these medications unchanged: Pills: Oral  methotrexate 10  pills (25mg) weekly, folic acid 1-2mg daily. This may still be helping the left eye which does not seem to have lens induced Uveitis  For the right eye, continue the pressure lowering drops the same: Combigan three times daily, Dorzolamide three times daily, Latanoprost at night   For the right eye, please continue 6x/day for one more week. After that (when Medrol dose pack is done), go down to 4x/day until next time.   For the left eye:  let's modify to prednisolone 2x/day, Combigan 2x/day  Add a six day course of medrol dose pack to help more quickly resolve things. Take full day's dose each morning with breakfast.     RTC 11/11 as scheduled with Yamanuha and Koozekanani     Physician Attestation    Attending Physician Attestation:  Complete documentation of historical and exam elements from today's encounter can be found in the full encounter summary report (not reduplicated in this progress note). I personally obtained the chief complaint(s) and history of present illness. I confirmed and edited as necessary the review of systems, past medical/surgical history, family history, social history, and examination findings as documented by others; and I examined the patient myself. I personally reviewed the relevant tests, images, and reports as documented above. I formulated and edited as necessary the assessment and plan and discussed the findings and management plan with the patient and family members present at the time of this visit.  Dheeraj Rich M.D., Uveitis and Medical Retina, October 30, 2024     Again, thank you for allowing me to participate in the care of your patient.      Sincerely,    Dheeraj Rich MD  Uveitis and Medical Retina    Department of Ophthalmology & Visual Neurosciences  [facility]

## 2024-10-30 NOTE — NURSING NOTE
Chief Complaints and History of Present Illnesses   Patient presents with    Iritis Follow Up      Uveitis follow up     Chief Complaint(s) and History of Present Illness(es)       Iritis Follow Up              Laterality: right eye    Comments:  Uveitis follow up              Comments    Patient states some uncomfortable soreness right eye, with some improvement with vision. Some new flashes right eye. Has floaters, bigger in size, right eye. Patient is unsure if redness either eye. Photosensitivity both eyes.    Ocular medications:   For the right eye, taking prednisolone six times daily, Combigan three times daily, Dorzolamide three times daily and Latanoprost at night and for the left eye, Prednisolone once daily and Combigan once daily. All of the eyedrops feel uncomfortable when putting in per patient. Also taking methotrexate 10 pills (25 mg) each week and folic acid 1-2 mg daily by mouth.    Halle Smith on 10/30/2024 at 8:55 AM

## 2024-11-10 NOTE — PROGRESS NOTES
CC -   UGH OD    INTERVAL HISTORY - Initial visit with me, referred by PHILL for planned IOL exchange with Yamane OD d/t [facility].    On MTx, recent Medrol dose apack at last visit with PHILL 10/30/24    OD - PF 3/day, combigan 2/day, dorzolamide 2/day, xalatan qhs  OS - PF 2/day, combigan 2/day    She is concerned as her right eye only had a laser done. Left eye had a buckle.     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 2013      PAST OCULAR SURGERY  YAG OS 2016  PPV/capsulectomy/ABx OD 2013 ([place])  ?PPV vs laser pexy OD ([place])  CE/IOL OD 12/2010  CE/IOL OS 9/2010  PPV/SBP/FGx  OS 8/2010  ([place])  Laser pexy OS ([facility]) ?2010         RETINAL IMAGING:  OCT   OD - ?staphyloma, no IRF/SRF, thin choroid   OS - ?staphyloma, no IRF/SRF, thin choroid    Fundus photos consistent with exam       ASSESSMENT & PLAN    # UGH OD   - Possibly due to lens, suspicious d/t UBM 10/23/24 shows capsular complex rubbing on iris   - no transillumination defects today but dilated     - could consider IOL exchange with Yamane   - d/w patient, combined case with cornea or single surgeon case with my colleague   - wishes to have single surgeon case with my colleague     - will schedule with Julian or Loving   - could consider evaluating capsule at time of surgery, ?removal of complex hitting iris or IOL exchange?        # recurrent Iritis OD   - seen by PHILL, on MTx   - recent Medrol dose pack 10/30/24   - gtts per JY      # Recurrent iritis OS   - seen by PHILL   - on PF 2/dy    # h/o PPV OU   - had RD OS    - ?RD OD   -  h/o chronic endophthalmitis       # Syneresis OU    No follow-ups on file.  Refer to Julian or Giuliana next available    Humphrey Devine MD  Resident Physician, PGY-3  Department of Ophthalmology     ATTESTATION     Attending Attestation:     Complete  documentation of historical and exam elements from today's encounter can be found in the full encounter summary report (not reduplicated in this progress note).  I personally obtained the chief complaint(s) and history of present illness.  I confirmed and edited as necessary the review of systems, past medical/surgical history, family history, social history, and examination findings as documented by others; and I examined the patient myself.  I personally reviewed the relevant tests, images, and reports as documented above.  I personally reviewed the ophthalmic test(s) associated with this encounter, agree with the interpretation(s) as documented by the resident/fellow, and have edited the corresponding report(s) as necessary.   I formulated and edited as necessary the assessment and plan and discussed the findings and management plan with the patient and family    Aurora Tan MD, PhD  , Vitreoretinal Surgery  Department of Ophthalmology  [facility]

## 2024-11-11 ENCOUNTER — OFFICE VISIT (OUTPATIENT)
Dept: OPHTHALMOLOGY | Facility: CLINIC | Age: 54
End: 2024-11-11
Attending: OPHTHALMOLOGY
Payer: COMMERCIAL

## 2024-11-11 DIAGNOSIS — H20.021 RECURRENT IRITIS OF RIGHT EYE: ICD-10-CM

## 2024-11-11 DIAGNOSIS — H40.042 STEROID RESPONDER, LEFT EYE: ICD-10-CM

## 2024-11-11 DIAGNOSIS — H40.051 OCULAR HYPERTENSION, RIGHT EYE: ICD-10-CM

## 2024-11-11 DIAGNOSIS — H20.00 ACUTE IRITIS OF LEFT EYE: ICD-10-CM

## 2024-11-11 DIAGNOSIS — Z79.899 HIGH RISK MEDICATION USE: ICD-10-CM

## 2024-11-11 DIAGNOSIS — H20.21 LENS-INDUCED IRIDOCYCLITIS, RIGHT EYE: Primary | ICD-10-CM

## 2024-11-11 PROCEDURE — 92250 FUNDUS PHOTOGRAPHY W/I&R: CPT | Performed by: OPHTHALMOLOGY

## 2024-11-11 PROCEDURE — 92134 CPTRZ OPH DX IMG PST SGM RTA: CPT | Performed by: OPHTHALMOLOGY

## 2024-11-11 PROCEDURE — 99211 OFF/OP EST MAY X REQ PHY/QHP: CPT | Performed by: OPHTHALMOLOGY

## 2024-11-11 RX ORDER — ERYTHROMYCIN 5 MG/G
0.5 OINTMENT OPHTHALMIC 2 TIMES DAILY
Qty: 3.5 G | Refills: 4 | Status: SHIPPED | OUTPATIENT
Start: 2024-11-11

## 2024-11-11 ASSESSMENT — REFRACTION_WEARINGRX
SPECS_TYPE: PAL
OD_ADD: +2.75
OS_AXIS: 120
OS_SPHERE: -1.00
SPECS_TYPE: PAL
OD_SPHERE: -2.25
OD_SPHERE: -2.25
OS_AXIS: 120
OD_ADD: +2.75
OD_CYLINDER: +0.50
OS_CYLINDER: +1.00
OS_SPHERE: -1.00
OS_CYLINDER: +1.00
OD_CYLINDER: +0.50
OS_ADD: +2.75
OD_AXIS: 010
OS_ADD: +2.75
OD_AXIS: 010

## 2024-11-11 ASSESSMENT — SLIT LAMP EXAM - LIDS
COMMENTS: NORMAL
COMMENTS: SUBTLE PTOSIS
COMMENTS: NORMAL
COMMENTS: NORMAL

## 2024-11-11 ASSESSMENT — CUP TO DISC RATIO
OD_RATIO: 0.55
OS_RATIO: 0.5
OS_RATIO: 0.5
OD_RATIO: 0.55

## 2024-11-11 ASSESSMENT — VISUAL ACUITY
OD_PH_CC+: -2
OD_CC+: -1
OS_CC: 20/25
OS_CC: 20/25
OD_CC: 20/50
OD_CC+: -1
OD_PH_CC+: -2
OS_CC+: -1
OD_PH_CC: 20/25
METHOD: SNELLEN - LINEAR
CORRECTION_TYPE: GLASSES
OD_CC: 20/50
CORRECTION_TYPE: GLASSES
OS_CC+: -1
METHOD: SNELLEN - LINEAR
OD_PH_CC: 20/25

## 2024-11-11 ASSESSMENT — TONOMETRY
OS_IOP_MMHG: 14
OD_IOP_MMHG: 19
OS_IOP_MMHG: 14
IOP_METHOD: TONOPEN
IOP_METHOD: TONOPEN
OD_IOP_MMHG: 19

## 2024-11-11 ASSESSMENT — CONF VISUAL FIELD
OS_NORMAL: 1
OS_SUPERIOR_TEMPORAL_RESTRICTION: 0
METHOD: COUNTING FINGERS
OS_INFERIOR_NASAL_RESTRICTION: 0
OS_INFERIOR_TEMPORAL_RESTRICTION: 0
OD_INFERIOR_TEMPORAL_RESTRICTION: 3
METHOD: COUNTING FINGERS
OS_SUPERIOR_NASAL_RESTRICTION: 0
OD_INFERIOR_TEMPORAL_RESTRICTION: 3

## 2024-11-11 ASSESSMENT — EXTERNAL EXAM - LEFT EYE
OS_EXAM: NORMAL
OS_EXAM: NORMAL

## 2024-11-11 ASSESSMENT — EXTERNAL EXAM - RIGHT EYE
OD_EXAM: NORMAL
OD_EXAM: NORMAL

## 2024-11-11 NOTE — LETTER
11/11/2024       RE: Debi Reed  [address]  [facility] [address]     Dear Colleague,    Thank you for referring your patient, Debi Reed, to the [facility] [place] at [facility]. Please see a copy of my visit note below.    Chief Complaint/Presenting Concern:  Uveitis follow up    Interval History of Present Ocular Illness:  Debi Reed is a 54 year old patient who returns for follow up of her recurrent iritis. Last visit we discussed a course of oral steroid. She saw Dr. Tan today and the recommendation was to see Dr. Jordan tomorrow as he does more lens surgeries.     Debi reports the right eye feels sore and the left eye is tired. We messaged about drops given sensitivity right eye and modified as below.     Interval Updates to Medical/Family/Social History:  No changes    Relevant Review of Systems Updates:  Did okay with oral steroid pills     Current eye related medications:    Drops: Dorzolamide 2 times daily right eye, Latanoprost at night right eye, Combigan 2 times daily each eye, Prednisolone 2x/day each eye   Pills:  Completed medrol dose pack,Oral methotrexate 10 pills (25mg) weekly, folic acid 1-2mg daily.     Retina/Uveitis Imaging:None today    Assessment:     1. Lens-induced iridocyclitis, right eye (Primary)  Some improvement      2. Acute iritis of left eye  Stable     3. Ocular hypertension, right eye  19    4. Steroid responder, left eye  14    5. High risk medication use  Oral  methotrexate 10 pills (25mg) weekly, folic acid 1-2mg daily. Completed medrol pack    Plan/Recommendations:    Discussed findings with patient and her Friend. The inflammation is improving after the medrol dose pack in the right eye and stable left eye. We will continue drops, methotrexate, and no need for oral steroids  Eye pressure is 19,14. We can continue this frequency of pressure lowering drops  Recommend  additional testing: None needed   Continue these:Drops unchanged Dorzolamide 2 times daily right eye, Latanoprost at night right eye, Combigan 2 times daily each eye, Prednisolone 2x/day each eye   Continue Oral methotrexate 10 pills (25mg) weekly, folic acid 1-2mg daily.   No medrol dose pack. If surgery planned for the right eye, then we will adjust the pills if needed  Start erythromycin ointment on the right eye at night and morning is also fine    RTC DR. Jordan tomorrow and then PHILL SHIPLEY    Physician Attestation    Attending Physician Attestation:  Complete documentation of historical and exam elements from today's encounter can be found in the full encounter summary report (not reduplicated in this progress note). I personally obtained the chief complaint(s) and history of present illness. I confirmed and edited as necessary the review of systems, past medical/surgical history, family history, social history, and examination findings as documented by others; and I examined the patient myself. I personally reviewed the relevant tests, images, and reports as documented above. I formulated and edited as necessary the assessment and plan and discussed the findings and management plan with the patient and family members present at the time of this visit.  Dheeraj Rich M.D., Uveitis and Medical Retina, November 11, 2024     Again, thank you for allowing me to participate in the care of your patient.      Sincerely,    Dheeraj Rich MD  Uveitis and Medical Retina    Department of Ophthalmology & Visual Neurosciences  [facility]

## 2024-11-11 NOTE — NURSING NOTE
Chief Complaints and History of Present Illnesses   Patient presents with    Retinal Evaluation     Lens-induced iridocyclitis, right eye      Chief Complaint(s) and History of Present Illness(es)       Retinal Evaluation              Comments: Lens-induced iridocyclitis, right eye               Comments    HX of RD both eye   States flashes and floaters in the right eye , some floaters in the left eye   Eye pain 2/10 on the pain scale   Redness right eye , no tearing     Marisela Lara COT 9:05 AM November 11, 2024

## 2024-11-11 NOTE — PROGRESS NOTES
Chief Complaint/Presenting Concern:  Uveitis follow up    Interval History of Present Ocular Illness:  Debi Reed is a 54 year old patient who returns for follow up of her recurrent iritis. Last visit we discussed a course of oral steroid. She saw Dr. Tan today and the recommendation was to see Dr. Jordan tomorrow as he does more lens surgeries.     Debi reports the right eye feels sore and the left eye is tired. We messaged about drops given sensitivity right eye and modified as below.     Interval Updates to Medical/Family/Social History:  No changes    Relevant Review of Systems Updates:  Did okay with oral steroid pills     Current eye related medications:    Drops: Dorzolamide 2 times daily right eye, Latanoprost at night right eye, Combigan 2 times daily each eye, Prednisolone 2x/day each eye   Pills:  Completed medrol dose pack,Oral methotrexate 10 pills (25mg) weekly, folic acid 1-2mg daily.     Retina/Uveitis Imaging:None today    Assessment:     1. Lens-induced iridocyclitis, right eye (Primary)  Some improvement    2. Acute iritis of left eye  Stable     3. Ocular hypertension, right eye  19    4. Steroid responder, left eye  14    5. High risk medication use  Oral  methotrexate 10 pills (25mg) weekly, folic acid 1-2mg daily. Completed medrol pack    Plan/Recommendations:    Discussed findings with patient and her Friend. The inflammation is improving after the medrol dose pack in the right eye and stable left eye. We will continue drops, methotrexate, and no need for oral steroids  Eye pressure is 19,14. We can continue this frequency of pressure lowering drops  Recommend additional testing: None needed   Continue these:Drops unchanged Dorzolamide 2 times daily right eye, Latanoprost at night right eye, Combigan 2 times daily each eye, Prednisolone 2x/day each eye   Continue Oral methotrexate 10 pills (25mg) weekly, folic acid 1-2mg daily.   No medrol dose pack. If surgery planned for  the right eye, then we will adjust the pills if needed  Start erythromycin ointment on the right eye at night and morning is also fine    RTC DR. Jordan tomorrow and then PHILL SHIPLEY    Physician Attestation     Attending Physician Attestation:  Complete documentation of historical and exam elements from today's encounter can be found in the full encounter summary report (not reduplicated in this progress note). I personally obtained the chief complaint(s) and history of present illness. I confirmed and edited as necessary the review of systems, past medical/surgical history, family history, social history, and examination findings as documented by others; and I examined the patient myself. I personally reviewed the relevant tests, images, and reports as documented above. I formulated and edited as necessary the assessment and plan and discussed the findings and management plan with the patient and family members present at the time of this visit.  Dheeraj Rich M.D., Uveitis and Medical Retina, November 11, 2024

## 2024-11-11 NOTE — PATIENT INSTRUCTIONS
Continue these:Drops unchanged Dorzolamide 2 times daily right eye, Latanoprost at night right eye, Combigan 2 times daily each eye, Prednisolone 2x/day each eye   Continue Oral methotrexate 10 pills (25mg) weekly, folic acid 1-2mg daily.   No medrol dose pack. If surgery planned for the right eye, then we will adjust the pills if needed  Start erythromycin ointment on the right eye at night and morning is also fine

## 2024-11-11 NOTE — NURSING NOTE
Chief Complaints and History of Present Illnesses   Patient presents with    Follow Up     Lens-induced iridocyclitis, right eye      Chief Complaint(s) and History of Present Illness(es)       Follow Up              Comments: Lens-induced iridocyclitis, right eye               Comments    HX of RD both eye  States flashes and floaters in the right eye , some floaters in the left eye  Eye pain 2/10 on the pain scale  Redness right eye , no tearing    Marisela Lara COT 9:05 AM November 11, 2024

## 2024-11-12 ENCOUNTER — OFFICE VISIT (OUTPATIENT)
Dept: OPHTHALMOLOGY | Facility: CLINIC | Age: 54
End: 2024-11-12
Attending: OPHTHALMOLOGY
Payer: COMMERCIAL

## 2024-11-12 DIAGNOSIS — H40.042 STEROID RESPONDER, LEFT EYE: ICD-10-CM

## 2024-11-12 DIAGNOSIS — H20.21 LENS-INDUCED IRIDOCYCLITIS, RIGHT EYE: ICD-10-CM

## 2024-11-12 DIAGNOSIS — H20.00 ACUTE IRITIS OF LEFT EYE: ICD-10-CM

## 2024-11-12 DIAGNOSIS — Z79.899 HIGH RISK MEDICATION USE: ICD-10-CM

## 2024-11-12 DIAGNOSIS — H20.021 RECURRENT IRITIS OF RIGHT EYE: Primary | ICD-10-CM

## 2024-11-12 DIAGNOSIS — H40.051 OCULAR HYPERTENSION, RIGHT EYE: ICD-10-CM

## 2024-11-12 PROCEDURE — 99211 OFF/OP EST MAY X REQ PHY/QHP: CPT | Performed by: OPHTHALMOLOGY

## 2024-11-12 ASSESSMENT — VISUAL ACUITY
CORRECTION_TYPE: GLASSES
METHOD: SNELLEN - LINEAR
OS_CC: 20/25
OD_PH_CC: 20/30
OD_CC: 20/40

## 2024-11-12 ASSESSMENT — CUP TO DISC RATIO
OS_RATIO: 0.5
OD_RATIO: 0.55

## 2024-11-12 ASSESSMENT — CONF VISUAL FIELD
OS_INFERIOR_TEMPORAL_RESTRICTION: 0
OD_INFERIOR_TEMPORAL_RESTRICTION: 3
OS_NORMAL: 1
OS_SUPERIOR_NASAL_RESTRICTION: 0
OS_INFERIOR_NASAL_RESTRICTION: 0
METHOD: COUNTING FINGERS
OS_SUPERIOR_TEMPORAL_RESTRICTION: 0

## 2024-11-12 ASSESSMENT — REFRACTION_WEARINGRX
OD_SPHERE: -2.25
SPECS_TYPE: PAL
OD_AXIS: 010
OS_AXIS: 120
OS_SPHERE: -1.00
OS_ADD: +2.75
OD_CYLINDER: +0.50
OS_CYLINDER: +1.00
OD_ADD: +2.75

## 2024-11-12 ASSESSMENT — TONOMETRY
IOP_METHOD: TONOPEN
OD_IOP_MMHG: 18
OS_IOP_MMHG: 17

## 2024-11-12 ASSESSMENT — SLIT LAMP EXAM - LIDS
COMMENTS: NORMAL
COMMENTS: NORMAL

## 2024-11-12 ASSESSMENT — EXTERNAL EXAM - RIGHT EYE: OD_EXAM: NORMAL

## 2024-11-12 ASSESSMENT — EXTERNAL EXAM - LEFT EYE: OS_EXAM: NORMAL

## 2024-11-12 NOTE — PROGRESS NOTES
Chief Complaint/Presenting Concern:  Uveitis follow up    Interval History of Present Ocular Illness:  Debi Reed is a 54 year old patient who referred by Dr. Rich for recurrent iritis to be evaluated for possible lens induced uveitis right eye.      Hx of CE IOL each eye ~14 yrs ago  Hx of laser tx for right eye retinal tears ~14 yrs ago  Hx SB and laser for left eye for RRD ~14 yrs ago    Started to have occasional uveitis ou after surgeries. But they become more frequent over time.   In 12/2023 right eye lost vision due to uveitis flare up and was sent to Dr. Layla Carrera reports the right eye feels sore and the left eye is tired. We messaged about drops given sensitivity right eye and modified as below.     Interval Updates to Medical/Family/Social History:  No changes    Did okay with oral steroid pills - not of oral steroid now     Meds:    Drops: Dorzolamide 2 times daily right eye, Latanoprost at night right eye, Combigan 2 times daily each eye, Prednisolone 2x/day each eye   Pills:  Completed medrol dose pack,Oral methotrexate 10 pills (25mg) weekly, folic acid 1-2mg daily.     Retina/Uveitis Imaging: None today    OCT 11/12/24: no IRF/SRF ou; PHF  ou; occasional vit cells right eye       Assessment:     1. Lens-induced iridocyclitis, right eye (Primary)  Some improvement    2. Acute iritis of left eye  Stable     3. Ocular hypertension, right eye  19    4. Steroid responder, left eye  14    5. High risk medication use  Oral  methotrexate 10 pills (25mg) weekly, folic acid 1-2mg daily. Completed medrol pack    Plan/Recommendations:      - I am not convinced that IOL / iris touch is inciting the intraocular inflammation; there are transillumination defects in inferior mid periphery right eye iris but IOL is decentered superiorly and there is no touch between the IOL and this part of the iris at least in a dilated pupil. Superior iris that may be against the IOL's edge does not  show any transillumination defect.   - in UBM, there is areas of lens capsule proliferation touching posterior iris surface and I watch these with repeating UBM in 3-4 months, if they grow larger, then we may think about surgical removal of those areas  - in the meantime, AC inflammation is minimal with current regimen (PF recently tapered to BID) and I think we should continue and very slowly taper directed by Dr. Rich      Continue these:Drops unchanged Dorzolamide 2 times daily right eye, Latanoprost at night right eye, Combigan 2 times daily each eye, Prednisolone 2x/day each eye   Continue Oral methotrexate 10 pills (25mg) weekly, folic acid 1-2mg daily.   No medrol dose pack. If surgery planned for the right eye, then we will adjust the pills if needed  Start erythromycin ointment on the right eye at night and morning is also fine    RTC DR. Jordan 3-4 months for examination by Julian and UBM before dilation for TIDs and then dilated exam ou and OCT and optos   ,    Complete documentation of historical and exam elements from today's encounter can be found in the full encounter summary report (not reduplicated in this progress note). I personally obtained the chief complaint(s) and history of present illness.  I confirmed and edited as necessary the review of systems, past medical/surgical history, family history, social history, and examination findings as documented by others; and I examined the patient myself. I personally reviewed the relevant tests, images, and reports as documented above. I formulated and edited as necessary the assessment and plan and discussed the findings and management plan with the patient and family.    Moncho Jrodan MD, PhD

## 2024-11-25 ENCOUNTER — OFFICE VISIT (OUTPATIENT)
Dept: OPHTHALMOLOGY | Facility: CLINIC | Age: 54
End: 2024-11-25
Attending: OPHTHALMOLOGY
Payer: COMMERCIAL

## 2024-11-25 DIAGNOSIS — Z79.899 HIGH RISK MEDICATION USE: ICD-10-CM

## 2024-11-25 DIAGNOSIS — H20.21 LENS-INDUCED IRIDOCYCLITIS, RIGHT EYE: Primary | ICD-10-CM

## 2024-11-25 DIAGNOSIS — H20.00 ACUTE IRITIS OF LEFT EYE: ICD-10-CM

## 2024-11-25 DIAGNOSIS — H40.042 STEROID RESPONDER, LEFT EYE: ICD-10-CM

## 2024-11-25 DIAGNOSIS — H40.051 OCULAR HYPERTENSION, RIGHT EYE: ICD-10-CM

## 2024-11-25 PROCEDURE — 99213 OFFICE O/P EST LOW 20 MIN: CPT | Performed by: OPHTHALMOLOGY

## 2024-11-25 RX ORDER — ACETAZOLAMIDE 500 MG/1
500 CAPSULE, EXTENDED RELEASE ORAL 2 TIMES DAILY
Qty: 60 CAPSULE | Refills: 2 | Status: SHIPPED | OUTPATIENT
Start: 2024-11-25

## 2024-11-25 ASSESSMENT — REFRACTION_WEARINGRX
OS_AXIS: 120
OS_SPHERE: -1.00
OD_SPHERE: -2.25
OS_CYLINDER: +1.00
OS_ADD: +2.75
OD_CYLINDER: +0.50
SPECS_TYPE: PAL
OD_AXIS: 010
OD_ADD: +2.75

## 2024-11-25 ASSESSMENT — VISUAL ACUITY
OD_CC: 20/30
CORRECTION_TYPE: GLASSES
OS_CC: 20/20
OD_CC+: -1
METHOD: SNELLEN - LINEAR

## 2024-11-25 ASSESSMENT — TONOMETRY
IOP_METHOD: APP BY JY
OD_IOP_MMHG: 32
IOP_METHOD: APPLANATION
OS_IOP_MMHG: 22
OS_IOP_MMHG: 22
OD_IOP_MMHG: 32

## 2024-11-25 ASSESSMENT — CONF VISUAL FIELD
OD_INFERIOR_NASAL_RESTRICTION: 0
OS_NORMAL: 1
OS_INFERIOR_TEMPORAL_RESTRICTION: 0
OD_INFERIOR_TEMPORAL_RESTRICTION: 0
METHOD: COUNTING FINGERS
OS_SUPERIOR_TEMPORAL_RESTRICTION: 0
OS_SUPERIOR_NASAL_RESTRICTION: 0
OD_SUPERIOR_NASAL_RESTRICTION: 0
OD_NORMAL: 1
OD_SUPERIOR_TEMPORAL_RESTRICTION: 0
OS_INFERIOR_NASAL_RESTRICTION: 0

## 2024-11-25 ASSESSMENT — EXTERNAL EXAM - LEFT EYE: OS_EXAM: NORMAL

## 2024-11-25 ASSESSMENT — EXTERNAL EXAM - RIGHT EYE: OD_EXAM: NORMAL

## 2024-11-25 ASSESSMENT — SLIT LAMP EXAM - LIDS
COMMENTS: NORMAL
COMMENTS: SUBTLE PTOSIS

## 2024-11-25 ASSESSMENT — CUP TO DISC RATIO
OS_RATIO: 0.5
OD_RATIO: 0.55

## 2024-11-25 NOTE — NURSING NOTE
Chief Complaints and History of Present Illnesses   Patient presents with    Iritis Follow Up     Chief Complaint(s) and History of Present Illness(es)       Iritis Follow Up              Laterality: right eye    Onset: gradual    Onset: months ago    Quality: Va is fine    Severity: moderate    Frequency: intermittently    Associated symptoms: eye pain (eyes were sore and dc gtt)    Treatments tried: no treatments    Pain scale: 0/10              Comments    Here for Recurrent iritis of right eye  Discontinue all gtts for the past 2 days  PF Systane gel   Erythromycin    Methotrexate   Folic acid  Ni Ojeda COT 12:37 PM November 25, 2024

## 2024-11-25 NOTE — PROGRESS NOTES
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  We last saw each other on November 11, 2024 at which time the Medrol Dosepak had improved the inflammation in the right eye and things were stable in the left eye.  We elected to continue methotrexate without any additional oral steroid and continue drops unchanged.    Debi saw Dr. Jordan on 11/12/24 who felt surgery should be deferred and things rechecked in a few months.    Debi reports the right eye has been feeling very raw the last few weeks and and tried to give her eye a few days off from drops.     Interval Updates to Medical/Family/Social History: Still caring for Mom and this has been contributing to stress    Relevant Review of Systems Updates:  As above     Current eye related medications: Oral methotrexate 10 pills (25mg) weekly, folic acid 1-2mg daily, No prescription drops just Systane gel and Erythromycin ointment    Retina/Uveitis Imaging: None today    Assessment:     1. Lens-induced iridocyclitis, right eye (Primary)  Improving     2. Acute iritis of left eye  Stable     3. Ocular hypertension, right eye  IOP 32 off drops    4. Steroid responder, left eye  IOP 22 off drops    5. High risk medication use  Oral methotrexate 25 mg weekly, Folic acid 1 mg daily    Plan/Recommendations:    Discussed findings with patient. Regarding the iritis of the right eye, this is improving with time even without steroid drops for a few days. The left eye is stable. We will try to limit drop use, hold off on surgery per my colleagues right eye and add pressure lowering treatments while continuing methotrexate given eye pressure was elevated in each eye on presentation likely due to being off drops. Given sensitivity to drops, we will try an oral  medication to see if this can help lower pressure and be better tolerated  Recommend additional testing: None at this time.   Continue methotrexate 10 pills (25 mg) weekly and folic acid daily  No steroid pills needed  For steroid drops, let's do Prednisolone in the morning in each eye. If things get cloudier, then increase to 2x/day in each eye   For pressure lowering drops, let's do Latanoprost at bedtime in each eye  Let's try a pressure lowering pill called Acetazolamide (Diamox) 500 mg once daily with food and if you feel up to it 2x/day.  Continue Systane gel and Erythromycin ointment    RTC 10-14 days, applanate, no dilation, no testing    Physician Attestation     Attending Physician Attestation:  Complete documentation of historical and exam elements from today's encounter can be found in the full encounter summary report (not reduplicated in this progress note). I personally obtained the chief complaint(s) and history of present illness. I confirmed and edited as necessary the review of systems, past medical/surgical history, family history, social history, and examination findings as documented by others; and I examined the patient myself. I personally reviewed the relevant tests, images, and reports as documented above. I formulated and edited as necessary the assessment and plan and discussed the findings and management plan with the patient and family members present at the time of this visit.  Dheeraj Rich M.D., Uveitis and Medical Retina, November 25, 2024

## 2024-11-25 NOTE — PATIENT INSTRUCTIONS
Continue methotrexate 10 pills (25 mg) weekly and folic acid daily  No steroid pills needed  For steroid drops, let's do Prednisolone in the morning in each eye. If things get cloudier, then increase to 2x/day in each eye   For pressure lowering drops, let's do Latanoprost at bedtime in each eye  Let's try a pressure lowering pill called Acetazolamide (Diamox) 500 mg once daily with food and if you feel up to it 2x/day.  Continue Systane gel and Erythromycin ointment

## 2024-11-25 NOTE — LETTER
11/25/2024       RE: Debi Reed  [id number]  [facility] [id number]     Dear Colleague,    Thank you for referring your patient, Debi Reed, to the [facility] [place] at [facility]. Please see a copy of my visit note below.    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  We last saw each other on November 11, 2024 at which time the Medrol Dosepak had improved the inflammation in the right eye and things were stable in the left eye.  We elected to continue methotrexate without any additional oral steroid and continue drops unchanged.    Debi saw Dr. oJrdan on 11/12/24 who felt surgery should be deferred and things rechecked in a few months.    Debi reports the right eye has been feeling very raw the last few weeks and and tried to give her eye a few days off from drops.     Interval Updates to Medical/Family/Social History: Still caring for mom and this has been contributing to stress    Relevant Review of Systems Updates:  As above     Current eye related medications: Oral methotrexate 10 pills (25mg) weekly, folic acid 1-2mg daily, No prescription drops just Systane gel and Erythromycin ointment    Retina/Uveitis Imaging: None today          Assessment:     1. Lens-induced iridocyclitis, right eye (Primary)  Improving     2. Acute iritis of left eye  Stable     3. Ocular hypertension, right eye  IOP 32 off drops    4. Steroid responder, left eye  IOP 22 off drops    5. High risk medication use  Oral methotrexate 25 mg weekly, Folic acid 1 mg daily    Plan/Recommendations:    Discussed findings with patient. Regarding the iritis of the right eye, this is improving with time even without steroid drops for a few  days. The left eye is stable. We will try to limit drop use, hold off on surgery per my colleagues right eye and add pressure lowering treatments while continuing methotrexate given eye pressure was elevated in each eye on presentation likely due to being off drops. Given sensitivity to drops, we will try an oral medication to see if this can help lower pressure and be better tolerated  Recommend additional testing: None at this time.   Continue methotrexate 10 pills (25 mg) weekly and folic acid daily  No steroid pills needed  For steroid drops, let's do Prednisolone in the morning in each eye. If things get cloudier, then increase to 2x/day in each eye   For pressure lowering drops, let's do Latanoprost at bedtime in each eye  Let's try a pressure lowering pill called Acetazolamide (Diamox) 500 mg once daily with food and if you feel up to it 2x/day.  Continue Systane gel and Erythromycin ointment    RTC 10-14 days, applanate, no dilation, no testing    Physician Attestation    Attending Physician Attestation:  Complete documentation of historical and exam elements from today's encounter can be found in the full encounter summary report (not reduplicated in this progress note). I personally obtained the chief complaint(s) and history of present illness. I confirmed and edited as necessary the review of systems, past medical/surgical history, family history, social history, and examination findings as documented by others; and I examined the patient myself. I personally reviewed the relevant tests, images, and reports as documented above. I formulated and edited as necessary the assessment and plan and discussed the findings and management plan with the patient and family members present at the time of this visit.  Dheeraj Rich M.D., Uveitis and Medical Retina, November 25, 2024     Again, thank you for allowing me to participate in the care of your patient.                  Sincerely,    Dheeraj Rich,  MD  Uveitis and Medical Retina    Department of Ophthalmology & Visual Neurosciences  [facility]

## 2024-12-05 ENCOUNTER — OFFICE VISIT (OUTPATIENT)
Dept: OPHTHALMOLOGY | Facility: CLINIC | Age: 54
End: 2024-12-05
Attending: OPHTHALMOLOGY
Payer: COMMERCIAL

## 2024-12-05 DIAGNOSIS — Z79.899 HIGH RISK MEDICATION USE: ICD-10-CM

## 2024-12-05 DIAGNOSIS — H40.043 STEROID RESPONDER, BILATERAL: ICD-10-CM

## 2024-12-05 DIAGNOSIS — H20.21 LENS-INDUCED IRIDOCYCLITIS, RIGHT EYE: Primary | ICD-10-CM

## 2024-12-05 DIAGNOSIS — H20.00 ACUTE IRITIS OF LEFT EYE: ICD-10-CM

## 2024-12-05 ASSESSMENT — CUP TO DISC RATIO
OD_RATIO: 0.55
OS_RATIO: 0.5

## 2024-12-05 ASSESSMENT — VISUAL ACUITY
OD_CC+: +1
OS_CC: 20/20
OD_PH_CC: 20/30
OD_CC: 20/40
CORRECTION_TYPE: GLASSES
METHOD: SNELLEN - LINEAR

## 2024-12-05 ASSESSMENT — CONF VISUAL FIELD
METHOD: COUNTING FINGERS
OD_SUPERIOR_TEMPORAL_RESTRICTION: 0
OS_INFERIOR_NASAL_RESTRICTION: 0
OS_SUPERIOR_NASAL_RESTRICTION: 0
OD_SUPERIOR_NASAL_RESTRICTION: 0
OS_SUPERIOR_TEMPORAL_RESTRICTION: 0
OS_INFERIOR_TEMPORAL_RESTRICTION: 0
OD_INFERIOR_TEMPORAL_RESTRICTION: 0
OS_NORMAL: 1
OD_NORMAL: 1
OD_INFERIOR_NASAL_RESTRICTION: 0

## 2024-12-05 ASSESSMENT — SLIT LAMP EXAM - LIDS
COMMENTS: NORMAL
COMMENTS: SUBTLE PTOSIS

## 2024-12-05 ASSESSMENT — TONOMETRY
OS_IOP_MMHG: 25
OD_IOP_MMHG: 30
IOP_METHOD: APPLANATION

## 2024-12-05 ASSESSMENT — REFRACTION_WEARINGRX
OS_ADD: +2.75
OS_CYLINDER: +1.00
SPECS_TYPE: PAL
OD_ADD: +2.75
OS_AXIS: 120
OS_SPHERE: -1.00
OD_AXIS: 010
OD_SPHERE: -2.25
OD_CYLINDER: +0.50

## 2024-12-05 ASSESSMENT — EXTERNAL EXAM - RIGHT EYE: OD_EXAM: NORMAL

## 2024-12-05 ASSESSMENT — EXTERNAL EXAM - LEFT EYE: OS_EXAM: NORMAL

## 2024-12-05 NOTE — PROGRESS NOTES
"Chief Complaint/Presenting Concern:  Uveitis follow up    Interval History of Present Ocular Illness:  Debi Reed is a 54 year old patient who returns for follow up of her iritis and ocular hypertension. Last visit inflammation was improving but pressure was elevated. We elected to try diamox and low dose steroid drops.    Debi tried the Diamox daily with lots of side effects which persisted even on every other day. This was last used a few days ago. The right eye feels a little less \"raw.\" Left eye aches at times.    Interval Updates to Medical/Family/Social History:    No changes    Relevant Review of Systems Updates:  Had severe lip and facial numbness, also ill feeling  on Diamox. Headaches and tingling in tongue    No new labs    Current eye related medications: Oral methotrexate 10 pills (25 mg) weekly and folic acid daily, Prednisolone once daily each eye, Latanoprost at bedtime in each eye, Systane gel and Erythromycin ointment as needed, no Diamox pills last few days    Retina/Uveitis Imaging: None today    Assessment:     1. Lens-induced iridocyclitis, right eye (Primary)  More active today     2. Acute iritis of left eye  Also increased activity    3. Steroid responder, bilateral   IOP 30, 25    4. High risk medication use  Oral methotrexate 10 pills (25 mg) weekly and folic acid daily    Plan/Recommendations:    Discussed findings with patient and her Friend. The inflammation is still present in each eye so perhaps we should modify drops a bit more rather than more systemic medications.  Eye pressure is 30,25 today off Diamox pills for a few days. We will modify as below given severe side effects from Diamox.  Recommend additional testing: None needed.   Continue Oral methotrexate 10 pills (25 mg) weekly and folic acid daily  For the Prednisolone steroid drop, let's try 3x/day for each eye   For pressure lowering, let's continue Latanoprost at bedtime in each eye and try Dorzolamide 3x/day for " each eye and Combigan 3x/day for each eye   No pressure lowering pills  You can try cooling drops in refrigerator and lubricating drops beforehand.  Continue Systane gel and Erythromycin ointment as needed  We discussed adding other systemic medications such as Humira but given the likely partial association of a mechanical component of the lens implant rubbing on the iris, we might considering holding off on this for a while.     RTC 2-3 weeks applanate, no dilation, no testing    Physician Attestation     Attending Physician Attestation:  Complete documentation of historical and exam elements from today's encounter can be found in the full encounter summary report (not reduplicated in this progress note). I personally obtained the chief complaint(s) and history of present illness. I confirmed and edited as necessary the review of systems, past medical/surgical history, family history, social history, and examination findings as documented by others; and I examined the patient myself. I personally reviewed the relevant tests, images, and reports as documented above. I formulated and edited as necessary the assessment and plan and discussed the findings and management plan with the patient and family members present at the time of this visit.  Dheeraj Rich M.D., Uveitis and Medical Retina, December 5, 2024

## 2024-12-05 NOTE — NURSING NOTE
Chief Complaints and History of Present Illnesses   Patient presents with    Iridocyclitis Follow Up     Chief Complaint(s) and History of Present Illness(es)       Iridocyclitis Follow Up              Laterality: right eye    Onset: gradual    Onset: months ago    Quality: States va is the same since last visit      Severity: moderate    Frequency: intermittently    Associated symptoms: headache (above the brows).  Negative for eye pain    Pain scale: 0/10              Comments    Here for Lens-induced iridocyclitis, right eye  Left eye achy   Latanoprost at bedtime in each eye  Prednisolone every day each eye  Methotrexate   Folic acid   Diamox last taken 12/02. Had bad reactions to the meds.  Ni Ojeda COT 12:39 PM December 5, 2024

## 2024-12-05 NOTE — LETTER
"12/5/2024       RE: Debi Reed  [address]  [facility] [address]     Dear Colleague,    Thank you for referring your patient, Debi Reed, to the [facility] [place] at [facility]. Please see a copy of my visit note below.    Chief Complaint/Presenting Concern:  Uveitis follow up    Interval History of Present Ocular Illness:  Debi Reed is a 54 year old patient who returns for follow up of her iritis and ocular hypertension. Last visit inflammation was improving but pressure was elevated. We elected to try diamox and low dose steroid drops.    Debi tried the Diamox daily with lots of side effects which persisted even on every other day. This was last used a few days ago. The right eye feels a little less \"raw.\" Left eye aches at times.    Interval Updates to Medical/Family/Social History:    No changes    Relevant Review of Systems Updates:  Had severe lip and facial numbness, also ill feeling  on Diamox. Headaches and tingling in tongue    No new labs    Current eye related medications: Oral methotrexate 10 pills (25 mg) weekly and folic acid daily, Prednisolone once daily each eye, Latanoprost at bedtime in each eye, Systane gel and Erythromycin ointment as needed, no Diamox pills last few days    Retina/Uveitis Imaging: None today    Assessment:     1. Lens-induced iridocyclitis, right eye (Primary)  More active today     2. Acute iritis of left eye  Also increased activity    3. Steroid responder, bilateral   IOP 30, 25    4. High risk medication use  Oral methotrexate 10 pills (25 mg) weekly and folic acid daily    Plan/Recommendations:    Discussed findings with patient and her Friend. The inflammation is still present in each eye so perhaps we should modify drops a bit more rather than more systemic medications.  Eye pressure is 30,25 today off Diamox pills for a few days. We will modify as below given severe " side effects from Diamox.  Recommend additional testing: None needed.   Continue Oral methotrexate 10 pills (25 mg) weekly and folic acid daily  For the Prednisolone steroid drop, let's try 3x/day for each eye   For pressure lowering, let's continue Latanoprost at bedtime in each eye and try Dorzolamide 3x/day for each eye and Combigan 3x/day for each eye   No pressure lowering pills  You can try cooling drops in refrigerator and lubricating drops beforehand.  Continue Systane gel and Erythromycin ointment as needed  We discussed adding other systemic medications such as Humira but given the likely partial association of a mechanical component of the lens implant rubbing on the iris, we might considering holding off on this for a while.     RTC 2-3 weeks applanate, no dilation, no testing    Attending Physician Attestation:  Complete documentation of historical and exam elements from today's encounter can be found in the full encounter summary report (not reduplicated in this progress note). I personally obtained the chief complaint(s) and history of present illness. I confirmed and edited as necessary the review of systems, past medical/surgical history, family history, social history, and examination findings as documented by others; and I examined the patient myself. I personally reviewed the relevant tests, images, and reports as documented above. I formulated and edited as necessary the assessment and plan and discussed the findings and management plan with the patient and family members present at the time of this visit.  Dheeraj Rich M.D., Uveitis and Medical Retina, December 5, 2024       Again, thank you for allowing me to participate in the care of your patient.      Sincerely,    Dheeraj Rich MD  Uveitis and Medical Retina    Department of Ophthalmology & Visual Neurosciences  [facility]

## 2024-12-05 NOTE — PATIENT INSTRUCTIONS
Continue Oral methotrexate 10 pills (25 mg) weekly and folic acid daily  For the Prednisolone steroid drop, let's try 3x/day for each eye   For pressure lowering, let's continue Latanoprost at bedtime in each eye and try Dorzolamide 3x/day for each eye and Combigan 3x/day for each eye   No pressure lowering pills  You can try cooling drops in refrigerator and lubricating drops beforehand.  Continue Systane gel and Erythromycin ointment as needed  We discussed adding other systemic medications such as Humira but given the likely partial association of a mechanical component of the lens implant rubbing on the iris, we might considering holding off on this for a while.

## 2024-12-16 ENCOUNTER — OFFICE VISIT (OUTPATIENT)
Dept: OPHTHALMOLOGY | Facility: CLINIC | Age: 54
End: 2024-12-16
Attending: OPHTHALMOLOGY
Payer: COMMERCIAL

## 2024-12-16 DIAGNOSIS — H40.043 STEROID RESPONDER, BILATERAL: ICD-10-CM

## 2024-12-16 DIAGNOSIS — H20.00 ACUTE IRITIS OF LEFT EYE: ICD-10-CM

## 2024-12-16 DIAGNOSIS — H20.21 LENS-INDUCED IRIDOCYCLITIS, RIGHT EYE: Primary | ICD-10-CM

## 2024-12-16 DIAGNOSIS — Z79.899 HIGH RISK MEDICATION USE: ICD-10-CM

## 2024-12-16 PROCEDURE — 99213 OFFICE O/P EST LOW 20 MIN: CPT | Performed by: OPHTHALMOLOGY

## 2024-12-16 RX ORDER — PREDNISOLONE ACETATE 10 MG/ML
1-2 SUSPENSION/ DROPS OPHTHALMIC DAILY
Qty: 10 ML | Refills: 2 | Status: SHIPPED | OUTPATIENT
Start: 2024-12-16

## 2024-12-16 ASSESSMENT — EXTERNAL EXAM - RIGHT EYE: OD_EXAM: NORMAL

## 2024-12-16 ASSESSMENT — REFRACTION_WEARINGRX
OD_ADD: +2.75
OD_SPHERE: -2.25
OS_SPHERE: -1.00
SPECS_TYPE: PAL
OD_CYLINDER: +0.50
OS_ADD: +2.75
OS_AXIS: 120
OD_AXIS: 010
OS_CYLINDER: +1.00

## 2024-12-16 ASSESSMENT — VISUAL ACUITY
OS_CC+: -3
OD_CC: 20/40
OS_CC: 20/20
METHOD: SNELLEN - LINEAR
OD_CC+: +1
OD_PH_CC: 20/25
CORRECTION_TYPE: GLASSES

## 2024-12-16 ASSESSMENT — TONOMETRY
OS_IOP_MMHG: 16
OD_IOP_MMHG: 18
IOP_METHOD: APP BY JY
OD_IOP_MMHG: 15
OS_IOP_MMHG: 16
IOP_METHOD: APPLANATION

## 2024-12-16 ASSESSMENT — EXTERNAL EXAM - LEFT EYE: OS_EXAM: NORMAL

## 2024-12-16 ASSESSMENT — SLIT LAMP EXAM - LIDS
COMMENTS: SUBTLE PTOSIS
COMMENTS: NORMAL

## 2024-12-16 ASSESSMENT — CUP TO DISC RATIO
OD_RATIO: 0.55
OS_RATIO: 0.5

## 2024-12-16 NOTE — LETTER
12/16/2024       RE: Debi Reed  [address]  [facility] [address]     Dear Colleague,    Thank you for referring your patient, Debi Reed, to the [facility] [place] at [facility]. Please see a copy of my visit note below.    Chief Complaint/Presenting Concern: Uveitis follow-up    Interval History of Present Ocular Illness:  Debi Reed is a 54 year old patient who returns for follow up of recurrent iritis of both eyes we last saw each other on December 5, 2024 at which time the inflammation was slightly more active today in both eyes and eye pressure was elevated.  We elected to modify her course given side effects from the Diamox and check things today.    Since then, Debi reports the eyes are still feeling a bit raw, although more left eye than right eye. She has been doing drops once a day roughly. Erythromycin ointment has been less helpful as the eyes have been a bit more dry. They do seem to ache more throughout the day.    Interval Updates to Medical/Family/Social History:  No other updates    Relevant Review of Systems Updates:  Getting over cold from last week.    Current eye related medications: Oral methotrexate 10 pills (25 mg) weekly and folic acid daily, Prednisolone once daily both eyes, Latanoprost at bedtime in each,Dorzolamide daily both eyes, Combigan daily both eyes, erythromycin ointment, no Systane gel currently    Retina/Uveitis Imaging: No imaging today    Assessment:     1. Lens-induced iridocyclitis, right eye (Primary)  Some improvement    2. Acute iritis of left eye  Slightly more inflammation     3. Steroid responder, bilateral  IOP 18,16    4. High risk medication use  Oral methotrexate 10 pills (25 mg) weekly and folic acid daily    Plan/Recommendations:    Discussed findings with patient and her friend. The inflammation is slightly improved right eye and minimally worse left eye.  Given the current regimen has been tolerated, we can continue unchanged  Eye pressure is 18,16. This is improved even without Diamox and less frequent drops.   Recommend additional testing: None needed  Continue the Oral methotrexate 10 pills (25 mg) weekly and folic acid daily all unchanged   For drops, let's keep the same once daily for all drops: Prednisolone once daily both eyes, Latanoprost at bedtime in each,Dorzolamide daily both eyes, Combigan daily both eyes  Okay to for Systane and then erythromycin  No other modifications needed    RTC 3-4 weeks Applanate, AC Check, no dilation but with UBM left eye (ordered)    Physician Attestation    Attending Physician Attestation:  Complete documentation of historical and exam elements from today's encounter can be found in the full encounter summary report (not reduplicated in this progress note). I personally obtained the chief complaint(s) and history of present illness. I confirmed and edited as necessary the review of systems, past medical/surgical history, family history, social history, and examination findings as documented by others; and I examined the patient myself. I personally reviewed the relevant tests, images, and reports as documented above. I formulated and edited as necessary the assessment and plan and discussed the findings and management plan with the patient and family members present at the time of this visit.  Dheeraj Rich M.D., Uveitis and Medical Retina, December 16, 2024     Again, thank you for allowing me to participate in the care of your patient.      Sincerely,    Dheeraj Rich MD  Uveitis and Medical Retina    Department of Ophthalmology & Visual Neurosciences  [facility]

## 2024-12-16 NOTE — PROGRESS NOTES
Chief Complaint/Presenting Concern: Uveitis follow-up    Interval History of Present Ocular Illness:  Debi Reed is a 54 year old patient who returns for follow up of recurrent iritis of both eyes we last saw each other on December 5, 2024 at which time the inflammation was slightly more active today in both eyes and eye pressure was elevated.  We elected to modify her course given side effects from the Diamox and check things today.    Since then, Debi reports the eyes are still feeling a bit raw, although more left eye than right eye. She has been doing drops once a day roughly. Erythromycin ointment has been less helpful as the eyes have been a bit more dry. They do seem to ache more throughout the day.    Interval Updates to Medical/Family/Social History:  No other updates    Relevant Review of Systems Updates:  Getting over cold from last week.    Current eye related medications: Oral methotrexate 10 pills (25 mg) weekly and folic acid daily, Prednisolone once daily both eyes, Latanoprost at bedtime in each,Dorzolamide daily both eyes, Combigan daily both eyes, erythromycin ointment, no Systane gel currently    Retina/Uveitis Imaging: No imaging today    Assessment:     1. Lens-induced iridocyclitis, right eye (Primary)  Some improvement    2. Acute iritis of left eye  Slightly more inflammation     3. Steroid responder, bilateral  IOP 18,16    4. High risk medication use  Oral methotrexate 10 pills (25 mg) weekly and folic acid daily    Plan/Recommendations:    Discussed findings with patient and her Friend. The inflammation is slightly improved right eye and minimally worse left eye. Given the current regimen has been tolerated, we can continue unchanged  Eye pressure is 18,16. This is improved even without Diamox and less frequent drops.   Recommend additional testing: None needed  Continue the Oral methotrexate 10 pills (25 mg) weekly and folic acid daily all unchanged   For drops, let's keep the  same once daily for all drops: Prednisolone once daily both eyes, Latanoprost at bedtime in each,Dorzolamide daily both eyes, Combigan daily both eyes  Okay to for Systane and then erythromycin  No other modifications needed    RTC 3-4 weeks Applanate, AC Check, no dilation but with UBM left eye (ordered)    Physician Attestation     Attending Physician Attestation:  Complete documentation of historical and exam elements from today's encounter can be found in the full encounter summary report (not reduplicated in this progress note). I personally obtained the chief complaint(s) and history of present illness. I confirmed and edited as necessary the review of systems, past medical/surgical history, family history, social history, and examination findings as documented by others; and I examined the patient myself. I personally reviewed the relevant tests, images, and reports as documented above. I formulated and edited as necessary the assessment and plan and discussed the findings and management plan with the patient and family members present at the time of this visit.  Dheeraj Rich M.D., Uveitis and Medical Retina, December 16, 2024 '

## 2024-12-16 NOTE — PATIENT INSTRUCTIONS
Continue the Oral methotrexate 10 pills (25 mg) weekly and folic acid daily all unchanged   For drops, let's keep the same once daily for all drops: Prednisolone once daily both eyes, Latanoprost at bedtime in each,Dorzolamide daily both eyes, Combigan daily both eyes  Okay to for Systane and then erythromycin  No other modifications needed

## 2024-12-16 NOTE — NURSING NOTE
"Chief Complaints and History of Present Illnesses   Patient presents with    Uveitis Follow-Up     Visit for Lens-induced iridocyclitis, right eye and Acute iritis of left eye     Chief Complaint(s) and History of Present Illness(es)       Uveitis Follow-Up              Laterality: both eyes    Associated symptoms: redness.  Negative for eye pain, itching, discharge and burning    Pain scale: 0/10    Comments: Visit for Lens-induced iridocyclitis, right eye and Acute iritis of left eye              Comments    Patient reports no significant change to vision since last visit and \"ache\" around left eye>right eye.    Ocular Meds:   Dorzolamide each eye ?at least once per day; last used last night   Combigan each eye ?at least once per day; last used last night  Latanoprost each eye last night   Pred once OU   Systane gel daily -- using PFAT's daily; lost the gel   Erythromycin ointment a day or so ago   Oral methotrexate 10 pills (25 mg) weekly and folic acid daily  Herminia JAIN 12:41 PM December 16, 2024                      "

## 2024-12-20 DIAGNOSIS — H40.042 STEROID RESPONDER, LEFT EYE: ICD-10-CM

## 2024-12-20 DIAGNOSIS — H40.051 OCULAR HYPERTENSION, RIGHT EYE: ICD-10-CM

## 2024-12-22 RX ORDER — ACETAZOLAMIDE 500 MG/1
500 CAPSULE, EXTENDED RELEASE ORAL 2 TIMES DAILY
Qty: 60 CAPSULE | Refills: 2 | OUTPATIENT
Start: 2024-12-22

## 2024-12-22 NOTE — TELEPHONE ENCOUNTER
acetaZOLAMIDE (DIAMOX SEQUEL) 500 MG 12 hr capsule   Disp 60  rfs  2  Start 11/25/24   end 12/5/24    Discontinued Therapy completed (No AVS) Dheeraj Rich MD 12/5/24 [id number]

## 2025-01-28 ENCOUNTER — OFFICE VISIT (OUTPATIENT)
Dept: OPHTHALMOLOGY | Facility: CLINIC | Age: 55
End: 2025-01-28
Attending: OPHTHALMOLOGY
Payer: COMMERCIAL

## 2025-01-28 DIAGNOSIS — Z79.899 HIGH RISK MEDICATION USE: ICD-10-CM

## 2025-01-28 DIAGNOSIS — H40.043 STEROID RESPONDER, BILATERAL: ICD-10-CM

## 2025-01-28 DIAGNOSIS — H20.21 LENS-INDUCED IRIDOCYCLITIS, RIGHT EYE: Primary | ICD-10-CM

## 2025-01-28 DIAGNOSIS — H20.00 ACUTE IRITIS OF LEFT EYE: ICD-10-CM

## 2025-01-28 PROCEDURE — 99213 OFFICE O/P EST LOW 20 MIN: CPT | Performed by: OPHTHALMOLOGY

## 2025-01-28 ASSESSMENT — CONF VISUAL FIELD
OS_SUPERIOR_NASAL_RESTRICTION: 0
OD_INFERIOR_TEMPORAL_RESTRICTION: 0
OS_SUPERIOR_TEMPORAL_RESTRICTION: 0
OS_INFERIOR_TEMPORAL_RESTRICTION: 0
METHOD: COUNTING FINGERS
OD_SUPERIOR_NASAL_RESTRICTION: 0
OD_INFERIOR_NASAL_RESTRICTION: 0
OS_INFERIOR_NASAL_RESTRICTION: 0
OD_NORMAL: 1
OD_SUPERIOR_TEMPORAL_RESTRICTION: 0
OS_NORMAL: 1

## 2025-01-28 ASSESSMENT — REFRACTION_WEARINGRX
OD_ADD: +2.75
OD_AXIS: 010
OS_CYLINDER: +1.00
OS_AXIS: 120
SPECS_TYPE: PAL
OD_CYLINDER: +0.50
OS_SPHERE: -1.00
OS_ADD: +2.75
OD_SPHERE: -2.25

## 2025-01-28 ASSESSMENT — EXTERNAL EXAM - LEFT EYE: OS_EXAM: NORMAL

## 2025-01-28 ASSESSMENT — CUP TO DISC RATIO
OS_RATIO: 0.5
OD_RATIO: 0.55

## 2025-01-28 ASSESSMENT — SLIT LAMP EXAM - LIDS
COMMENTS: SUBTLE PTOSIS
COMMENTS: NORMAL

## 2025-01-28 ASSESSMENT — VISUAL ACUITY
METHOD: SNELLEN - LINEAR
OS_CC: 20/25
CORRECTION_TYPE: GLASSES
OD_CC: 20/30

## 2025-01-28 ASSESSMENT — TONOMETRY
IOP_METHOD: APPLANATION
OD_IOP_MMHG: 14
OS_IOP_MMHG: 12

## 2025-01-28 ASSESSMENT — EXTERNAL EXAM - RIGHT EYE: OD_EXAM: NORMAL

## 2025-01-28 NOTE — LETTER
1/28/2025       RE: Debi Reed  [address]  [facility] [id number]     Dear Colleague,    Thank you for referring your patient, Debi Reed, to the [facility] [place] at [facility]. Please see a copy of my visit note below.    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 Last visit we monitored on the same drops as we saw the ultrasound of the lens right eye was stable and the left eye was normal.     Debi notes the eyes get tired with some blurry/double when reading. This makes getting through a book more time consuming so Debi takes more breaks.     Interval Updates to Medical/Family/Social History:  No changes    Relevant Review of Systems Updates:  No major updates     Current eye related medications: Prednisolone (pink cap) once daily in both eyes, Latanoprost (teal cap) at bedtime in both eyes, Dorzolamide (orange cap) daily in both eyes, Combigan (dark blue cap) daily in both eyes, oral  Methotrexate  25 mg (10 pills), Folic acid 1 mg daily     Retina/Uveitis Imaging: No Imaging    Assessment:     1. Lens-induced iridocyclitis, right eye (Primary)  Stable low grade     2. Acute iritis of left eye  Stable low grade as well    3. Steroid responder, bilateral  IOP 14,12    4. High risk medication use  oral Methotrexate  25 mg (10 pills), Folic acid 1 mg daily     Plan/Recommendations:    Discussed findings with patient and her friend. Eyes are stable and symptoms tolerable with the current regimen. We can continue methotrexate and drops unchanged even with low grade inflammation   Eye pressure is 14,12. We can keep the drops unchanged  Recommend additional testing: None  Continue these medications all unchanged:  Prednisolone (pink cap) once daily in both eyes, Latanoprost (teal cap) at bedtime in both eyes, Dorzolamide (orange cap) daily in both eyes, Combigan (dark blue cap) daily in both eyes  Continue oral  Methotrexate  25 mg (10 pills), Folic acid 1 mg daily   No new treatments or changes. No urgency for Humira but we can consider next time based on the course and evaluation with Dr. Jordan    RTC 2/18/25 with Suni --no testing for JY    Physician Attestation    Attending Physician Attestation:  Complete documentation of historical and exam elements from today's encounter can be found in the full encounter summary report (not reduplicated in this progress note). I personally obtained the chief complaint(s) and history of present illness. I confirmed and edited as necessary the review of systems, past medical/surgical history, family history, social history, and examination findings as documented by others; and I examined the patient myself. I personally reviewed the relevant tests, images, and reports as documented above. I formulated and edited as necessary the assessment and plan and discussed the findings and management plan with the patient and family members present at the time of this visit.  Dheeraj Rich M.D., Uveitis and Medical Retina, January 28, 2025     Again, thank you for allowing me to participate in the care of your patient.      Sincerely,    Dheeraj Rich MD  Uveitis and Medical Retina    Department of Ophthalmology & Visual Neurosciences  [facility]

## 2025-01-28 NOTE — PROGRESS NOTES
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 Last visit we monitored on the same drops as we saw the ultrasound of the lens right eye was stable and the left eye was normal.     Debi notes the eyes get tired with some blurry/double when reading. This makes getting through a book more time consuming so Debi takes more breaks.     Interval Updates to Medical/Family/Social History:  No changes    Relevant Review of Systems Updates:  No major updates     Current eye related medications: Prednisolone (pink cap) once daily in both eyes, Latanoprost (teal cap) at bedtime in both eyes, Dorzolamide (orange cap) daily in both eyes, Combigan (dark blue cap) daily in both eyes, oral  Methotrexate  25 mg (10 pills), Folic acid 1 mg daily     Retina/Uveitis Imaging: No Imaging    Assessment:     1. Lens-induced iridocyclitis, right eye (Primary)  Stable low grade     2. Acute iritis of left eye  Stable low grade as well    3. Steroid responder, bilateral  IOP 14,12    4. High risk medication use  oral Methotrexate  25 mg (10 pills), Folic acid 1 mg daily     Plan/Recommendations:    Discussed findings with patient and her Friend. Eyes are stable and symptoms tolerable with the current regimen. We can continue methotrexate and drops unchanged even with low grade inflammation   Eye pressure is 14,12. We can keep the drops unchanged  Recommend additional testing: None  Continue these medications all unchanged: Prednisolone (pink cap) once daily in both eyes, Latanoprost (teal cap) at bedtime in both eyes, Dorzolamide (orange cap) daily in both eyes, Combigan (dark blue cap) daily in both eyes  Continue oral  Methotrexate  25 mg (10 pills), Folic acid 1 mg daily   No new treatments or changes. No urgency for Humira but we can  consider next time based on the course and evaluation with Dr. Jordan    [facility] 2/18/25 with Suni --no testing for JY    Physician Attestation     Attending Physician Attestation:  Complete documentation of historical and exam elements from today's encounter can be found in the full encounter summary report (not reduplicated in this progress note). I personally obtained the chief complaint(s) and history of present illness. I confirmed and edited as necessary the review of systems, past medical/surgical history, family history, social history, and examination findings as documented by others; and I examined the patient myself. I personally reviewed the relevant tests, images, and reports as documented above. I formulated and edited as necessary the assessment and plan and discussed the findings and management plan with the patient and family members present at the time of this visit.  Dheeraj Rich M.D., Uveitis and Medical Retina, January 28, 2025

## 2025-01-28 NOTE — NURSING NOTE
Chief Complaints and History of Present Illnesses   Patient presents with    Iridocyclitis Follow Up     Chief Complaint(s) and History of Present Illness(es)       Iridocyclitis Follow Up              Laterality: right eye    Onset: gradual    Onset: months ago    Quality: States va is the same since last visit.  Shadowing around letters at near      Severity: mild    Frequency: intermittently    Associated symptoms: photophobia, flashes and floaters    Treatments tried: eye drops    Pain scale: 0/10              Comments    Here for Lens-induced iridocyclitis right eye  Prednisolone (pink cap) once daily in both eyes  Latanoprost (teal cap) at bedtime in both eyes  Dorzolamide (orange cap) daily in both eyes  Combigan (dark blue cap) daily in both eyes  Methotrexate  Folic acid  Ni Ojeda COT 8:31 AM January 28, 2025

## 2025-02-18 ENCOUNTER — OFFICE VISIT (OUTPATIENT)
Dept: OPHTHALMOLOGY | Facility: CLINIC | Age: 55
End: 2025-02-18
Attending: OPHTHALMOLOGY
Payer: COMMERCIAL

## 2025-02-18 DIAGNOSIS — H20.00 ACUTE IRITIS OF LEFT EYE: ICD-10-CM

## 2025-02-18 DIAGNOSIS — H20.21 LENS-INDUCED IRIDOCYCLITIS, RIGHT EYE: Primary | ICD-10-CM

## 2025-02-18 DIAGNOSIS — Z79.899 HIGH RISK MEDICATION USE: ICD-10-CM

## 2025-02-18 DIAGNOSIS — H40.043 STEROID RESPONDER, BILATERAL: ICD-10-CM

## 2025-02-18 PROCEDURE — 92250 FUNDUS PHOTOGRAPHY W/I&R: CPT | Performed by: OPHTHALMOLOGY

## 2025-02-18 PROCEDURE — 99213 OFFICE O/P EST LOW 20 MIN: CPT | Performed by: OPHTHALMOLOGY

## 2025-02-18 PROCEDURE — 999N000103 HC STATISTIC NO CHARGE FACILITY FEE

## 2025-02-18 ASSESSMENT — VISUAL ACUITY
OD_CC+: +1
OD_CC: 20/30
OS_CC+: -3
OS_CC+: -3
OS_CC: 20/25
METHOD: SNELLEN - LINEAR
OS_CC: 20/25
OD_CC: 20/30
CORRECTION_TYPE: GLASSES
OD_CC+: +1
CORRECTION_TYPE: GLASSES
METHOD: SNELLEN - LINEAR

## 2025-02-18 ASSESSMENT — REFRACTION_WEARINGRX
OS_ADD: +2.75
OS_SPHERE: -1.00
SPECS_TYPE: PAL
OS_SPHERE: -1.00
OD_ADD: +2.75
SPECS_TYPE: PAL
OD_CYLINDER: +0.50
OD_CYLINDER: +0.50
OS_AXIS: 120
OS_CYLINDER: +1.00
OD_AXIS: 010
OS_CYLINDER: +1.00
OS_ADD: +2.75
OS_AXIS: 120
OD_ADD: +2.75
OD_AXIS: 010
OD_SPHERE: -2.25
OD_SPHERE: -2.25

## 2025-02-18 ASSESSMENT — CONF VISUAL FIELD
OD_SUPERIOR_TEMPORAL_RESTRICTION: 0
OS_NORMAL: 1
OD_NORMAL: 1
OD_INFERIOR_NASAL_RESTRICTION: 0
OS_SUPERIOR_NASAL_RESTRICTION: 0
OS_INFERIOR_TEMPORAL_RESTRICTION: 0
OS_SUPERIOR_NASAL_RESTRICTION: 0
OD_INFERIOR_NASAL_RESTRICTION: 0
OS_INFERIOR_NASAL_RESTRICTION: 0
OD_NORMAL: 1
OS_SUPERIOR_TEMPORAL_RESTRICTION: 0
OD_SUPERIOR_NASAL_RESTRICTION: 0
OS_INFERIOR_TEMPORAL_RESTRICTION: 0
OD_SUPERIOR_NASAL_RESTRICTION: 0
OS_INFERIOR_NASAL_RESTRICTION: 0
OS_SUPERIOR_TEMPORAL_RESTRICTION: 0
METHOD: COUNTING FINGERS
METHOD: COUNTING FINGERS
OD_SUPERIOR_TEMPORAL_RESTRICTION: 0
OD_INFERIOR_TEMPORAL_RESTRICTION: 0
OS_NORMAL: 1
OD_INFERIOR_TEMPORAL_RESTRICTION: 0

## 2025-02-18 ASSESSMENT — CUP TO DISC RATIO
OS_RATIO: 0.5
OD_RATIO: 0.55
OD_RATIO: 0.55
OS_RATIO: 0.5

## 2025-02-18 ASSESSMENT — EXTERNAL EXAM - RIGHT EYE
OD_EXAM: NORMAL
OD_EXAM: NORMAL

## 2025-02-18 ASSESSMENT — TONOMETRY
IOP_METHOD: APPLANATION
IOP_METHOD: TONOPEN
IOP_METHOD: TONOPEN
OD_IOP_MMHG: 13
OS_IOP_MMHG: 10
OS_IOP_MMHG: 10
OD_IOP_MMHG: 15
OS_IOP_MMHG: 17
OD_IOP_MMHG: 13

## 2025-02-18 ASSESSMENT — EXTERNAL EXAM - LEFT EYE
OS_EXAM: NORMAL
OS_EXAM: NORMAL

## 2025-02-18 ASSESSMENT — SLIT LAMP EXAM - LIDS
COMMENTS: NORMAL
COMMENTS: NORMAL
COMMENTS: SUBTLE PTOSIS
COMMENTS: SUBTLE PTOSIS

## 2025-02-18 NOTE — NURSING NOTE
Chief Complaints and History of Present Illnesses   Patient presents with    Iritis Follow Up     3 week follow up      Chief Complaint(s) and History of Present Illness(es)       Iritis Follow Up              Comments: 3 week follow up               Comments    Pt states vision is about the same as last visit. Pt does note some diplopia diagonally when reading. No improvement with drops. Still seeing some flashes and floaters in each eye, but nothing new. LE has been feeling achy 75% of the time. No DM.    FEMI Peña February 18, 2025 8:15 AM

## 2025-02-18 NOTE — PROGRESS NOTES
Chief Complaint/Presenting Concern:  Uveitis follow up    Initial History of Present Ocular Illness:  Debi Reed is a 54 year old patient who referred by Dr. Rich for recurrent iritis to be evaluated for possible lens induced uveitis right eye.      Hx of CE IOL each eye ~14 yrs ago  Hx of laser tx for right eye retinal tears ~14 yrs ago  Hx SB and laser for left eye for RRD ~14 yrs ago    Started to have occasional uveitis ou after surgeries. But they become more frequent over time.   In 12/2023 right eye lost vision due to uveitis flare up and was sent to Dr. Rich     Interval history 02/18/25   Pt states vision is about the same as last visit. Pt does note some diplopia diagonally when reading. No improvement with drops. Still seeing some flashes and floaters in each eye, but nothing new. LE has been feeling achy 75% of the time. No DM.     Interval Updates to Medical/Family/Social History:  No changes    Did okay with oral steroid pills - not of oral steroid now     Meds:     Prednisolone (pink cap) once daily in both eyes, Latanoprost (teal cap) at bedtime in both eyes, Dorzolamide (orange cap) daily in both eyes, Combigan (dark blue cap) daily in both eyes  Continue oral  Methotrexate  25 mg (10 pills), Folic acid 1 mg daily     Retina/Uveitis Imaging:    OCT 02/18/25: no IRF/SRF ou; PHF  ou; occasional vit cells right eye       Assessment:     1. Lens-induced iridocyclitis, right eye (Primary)  Some improvement    2. Acute iritis of left eye  Stable     3. Steroid responder, both eyes     5. High risk medication use  Oral methotrexate 10 pills (25mg) weekly, folic acid 1-2mg daily.      Plan/Recommendations:    - right eye IOL is slightly decentered but no obvious touch with the iris. There is no obvious transillumination defect. Left eye also has uveitis that makes me think right eye IOL is not causing right inflammation. I still do not recommend surgery. She has good inflmmation  control now and I asked Debi to continue to follow with Dr. Rich.   - in UBM, there is areas of lens capsule proliferation possibly touching posterior iris surface but they have not changed.  - Plan to continue drops per Dr. Rich      RTC with retina if needed    Albert Tomas MD  Resident Physician, PGY-3  Department of Ophthalmology     Complete documentation of historical and exam elements from today's encounter can be found in the full encounter summary report (not reduplicated in this progress note). I personally obtained the chief complaint(s) and history of present illness.  I confirmed and edited as necessary the review of systems, past medical/surgical history, family history, social history, and examination findings as documented by others; and I examined the patient myself. I personally reviewed the relevant tests, images, and reports as documented above. I formulated and edited as necessary the assessment and plan and discussed the findings and management plan with the patient and family.    Moncho Jordan MD, PhD

## 2025-02-18 NOTE — PATIENT INSTRUCTIONS
Continue these medications unchanged: Oral Methotrexate  25 mg (10 pills), Folic acid 1 mg daily   For the drops, continue the same: Prednisolone (pink cap) once daily in both eyes, Latanoprost (teal cap) at bedtime in both eyes, Dorzolamide (orange cap) daily in both eyes, Combigan (dark blue cap) daily in both eyes. Let's also try no drop Saturday to give the eyes a rest as they are doing well  Regarding Humira, there does not seem to be any urgency to add this to or replace it with Methotrexate but we can defer to Dr. Calvo if you and he feel this might be helpful for joints (and also benefit eyes). This could also do well with some of the methotrexate related side effects.

## 2025-02-18 NOTE — LETTER
2/18/2025       RE: Debi Reed  [address]  [facility] [address]     Dear Colleague,    Thank you for referring your patient, Debi Reed, to the [facility] [place] at [facility]. Please see a copy of my visit note below.    Chief Complaint/Presenting Concern:  Uveitis follow up    Interval History of Present Ocular Illness:  Debi Reed is a 54 year old patient who returns for follow up of her lens induced iridocyclitis right eye and iritis left eye. Last visit inflammation and pressure were stable.     Debi saw Dr. Jordan today who did not recommend surgery and recommended monitoring on drops and other medications, particularly given the left eye has inflammation as well.     Debi notes that the drops do make the vision for a while when using them.     Interval Updates to Medical/Family/Social History:    Will see Dr. Calvo in a few weeks    Relevant Review of Systems Updates:  Possible hip bursitis.     Current eye related medications:   Drops: Prednisolone (pink cap) once daily in both eyes, Latanoprost (teal cap) at bedtime in both eyes, Dorzolamide (orange cap) daily in both eyes, Combigan (dark blue cap) daily in both eyes  Pills: Oral Methotrexate  25 mg (10 pills), Folic acid 1 mg daily     No Imaging today    Assessment:     1. Lens-induced iridocyclitis, right eye (Primary)  Stable to slightly improved    2. Acute iritis of left eye  Improving     3. Steroid responder, bilateral  IOP 15,17 on many drops each just once daily in each eye     4. High risk medication use  Oral Methotrexate 25 mg (10 pills), Folic acid 1 mg daily     Plan/Recommendations:    Discussed findings with patient and her Friend. There is still mild inflammation but this is stable. Dr. Jordan has not recommended surgery for the right eye at this time. We can monitor on methotrexate and drops for now  Eye pressure is 15,17 on  drops. Given sensitivity to drops, we will continue most drops each in each eye, try to take one day off each week (Methotrexate Saturdays) and await consultation with Dr. Lara  Recommend additional testing when scheduled per Dr. Calvo.   Continue these medications unchanged: Oral Methotrexate  25 mg (10 pills), Folic acid 1 mg daily   For the drops, continue the same: Prednisolone (pink cap) once daily in both eyes, Latanoprost (teal cap) at bedtime in both eyes, Dorzolamide (orange cap) daily in both eyes, Combigan (dark blue cap) daily in both eyes. Let's also try no drop Saturday to give the eyes a rest as they are doing well  Regarding Humira, there does not seem to be any urgency to add this to or replace it with Methotrexate but we can defer to Dr. Calvo if you and he feel this might be helpful for joints (and also benefit eyes). This could also do well with some of the methotrexate related side effects.    RTC    Dr. Calvo in a few weeks as scheduled    3/19 Layla and Jane same day to consider SLT laser one/both eyes, no dilation or no testing for JY    Physician Attestation    Attending Physician Attestation:  Complete documentation of historical and exam elements from today's encounter can be found in the full encounter summary report (not reduplicated in this progress note). I personally obtained the chief complaint(s) and history of present illness. I confirmed and edited as necessary the review of systems, past medical/surgical history, family history, social history, and examination findings as documented by others; and I examined the patient myself. I personally reviewed the relevant tests, images, and reports as documented above. I formulated and edited as necessary the assessment and plan and discussed the findings and management plan with the patient and family members present at the time of this visit.  Dheeraj Rich M.D., Uveitis and Medical Retina, February 18, 2025     Again,  thank you for allowing me to participate in the care of your patient.      Sincerely,    Dheeraj Rich MD  Uveitis and Medical Retina    Department of Ophthalmology & Visual Neurosciences  [facility]

## 2025-02-18 NOTE — NURSING NOTE
Chief Complaints and History of Present Illnesses   Patient presents with    Iritis Follow Up     3 month follow up      Chief Complaint(s) and History of Present Illness(es)       Iritis Follow Up              Comments: 3 month follow up               Comments    Pt states vision is about the same as last visit. Pt does note some diplopia diagonally when reading. No improvement with drops. Still seeing some flashes and floaters in each eye, but nothing new. LE has been feeling achy 75% of the time. No DM.    FEMI Peña February 18, 2025 8:15 AM

## 2025-02-18 NOTE — PROGRESS NOTES
Chief Complaint/Presenting Concern:  Uveitis follow up    Interval History of Present Ocular Illness:  Debi Reed is a 54 year old patient who returns for follow up of her lens induced iridocyclitis right eye and iritis left eye. Last visit inflammation and pressure were stable.     Debi saw Dr. Jordan today who did not recommend surgery and recommended monitoring on drops and other medications, particularly given the left eye has inflammation as well.     Debi notes that the drops do make the vision for a while when using them.     Interval Updates to Medical/Family/Social History:    Will see Dr. Calvo in a few weeks    Relevant Review of Systems Updates:  Possible hip bursitis.     Current eye related medications:   Drops: Prednisolone (pink cap) once daily in both eyes, Latanoprost (teal cap) at bedtime in both eyes, Dorzolamide (orange cap) daily in both eyes, Combigan (dark blue cap) daily in both eyes  Pills: Oral Methotrexate  25 mg (10 pills), Folic acid 1 mg daily     No Imaging today    Assessment:     1. Lens-induced iridocyclitis, right eye (Primary)  Stable to slightly improved    2. Acute iritis of left eye  Improving     3. Steroid responder, bilateral  IOP 15,17 on many drops each just once daily in each eye     4. High risk medication use  Oral Methotrexate 25 mg (10 pills), Folic acid 1 mg daily     Plan/Recommendations:    Discussed findings with patient and her Friend. There is still mild inflammation but this is stable. Dr. Jordan has not recommended surgery for the right eye at this time. We can monitor on methotrexate and drops for now  Eye pressure is 15,17 on drops. Given sensitivity to drops, we will continue most drops each in each eye, try to take one day off each week (Methotrexate Saturdays) and await consultation with Dr. Lara  Recommend additional testing when scheduled per Dr. Calvo.   Continue these medications unchanged: Oral Methotrexate  25 mg (10 pills),  Folic acid 1 mg daily   For the drops, continue the same: Prednisolone (pink cap) once daily in both eyes, Latanoprost (teal cap) at bedtime in both eyes, Dorzolamide (orange cap) daily in both eyes, Combigan (dark blue cap) daily in both eyes. Let's also try no drop Saturday to give the eyes a rest as they are doing well  Regarding Humira, there does not seem to be any urgency to add this to or replace it with Methotrexate but we can defer to Dr. Calvo if you and he feel this might be helpful for joints (and also benefit eyes). This could also do well with some of the methotrexate related side effects.    RTC    Dr. Calvo in a few weeks as scheduled    3/19 Layla and Jane same day to consider SLT laser one/both eyes, no dilation or no testing for JY    Physician Attestation     Attending Physician Attestation:  Complete documentation of historical and exam elements from today's encounter can be found in the full encounter summary report (not reduplicated in this progress note). I personally obtained the chief complaint(s) and history of present illness. I confirmed and edited as necessary the review of systems, past medical/surgical history, family history, social history, and examination findings as documented by others; and I examined the patient myself. I personally reviewed the relevant tests, images, and reports as documented above. I formulated and edited as necessary the assessment and plan and discussed the findings and management plan with the patient and family members present at the time of this visit.  Dheeraj Rich M.D., Uveitis and Medical Retina, February 18, 2025

## 2025-02-19 ENCOUNTER — MYC MEDICAL ADVICE (OUTPATIENT)
Dept: OPHTHALMOLOGY | Facility: CLINIC | Age: 55
End: 2025-02-19
Payer: COMMERCIAL

## 2025-02-19 DIAGNOSIS — H40.051 OCULAR HYPERTENSION, RIGHT EYE: ICD-10-CM

## 2025-02-19 NOTE — TELEPHONE ENCOUNTER
Last Written Prescription:  latanoprost (XALATAN) 0.005 % ophthalmic solution 2.5 mL 5 10/23/2024     ----------------------  Last Visit Date: 2/18/25  Future Visit Date: 3/19/25    For the drops, continue the same:Latanoprost (teal cap) at bedtime in both eyes,          [x]  Refill decision: Medication unable to be refilled by RN due to: Verification - order discrepancy, clarification needed, Sig modification needed    Clinic note does not match prescription directions  Donna Hernández RN  [facility] Triage & Med Refill Team       Request from pharmacy:  Requested Prescriptions   Pending Prescriptions Disp Refills    latanoprost (XALATAN) 0.005 % ophthalmic solution 2.5 mL 5     Sig: Place 1 drop into the right eye at bedtime.       There is no refill protocol information for this order

## 2025-02-20 RX ORDER — LATANOPROST 50 UG/ML
1 SOLUTION/ DROPS OPHTHALMIC AT BEDTIME
Qty: 2.5 ML | Refills: 5 | Status: SHIPPED | OUTPATIENT
Start: 2025-02-20

## 2025-02-20 NOTE — TELEPHONE ENCOUNTER
Pt last seen 2/18/2025 with ok to continue Latanoprost in each eye at night.    Rx for Latanoprost sent per request.    Kyrie Gutierrez RN 6:54 AM 02/20/25

## 2025-02-20 NOTE — TELEPHONE ENCOUNTER
Spoke to pt at 1300    Pt with blurry vision yesterday    No redness (pt states typically does not get redness).    Pt states some pain/uncomfortable     Pt using prednisolone 2/day yesterday and today and extra drop of combigan.    Pt moving mother to assisted living today and would not be able to come in today-- offered appt with another eye provider.    Pt aware Dr. Rich in clinic tomorrow and able to assist in plan of care-- possibly this afternoon if performing office duties.    Pt seemed comfortable with information.    Kyrie Gutierrez RN 1:06 PM 02/20/25

## 2025-03-05 NOTE — PROGRESS NOTES
Rheumatology Clinic Visit  [facility]  Colby Calvo M.D.     Debi Reed MRN# [id number]   YOB: 1970 Age: 54 year old   Date of Visit: 03/06/2025  Primary care provider: Taisha Villa          Assessment and Plan:     # right eye uveitis with intermittent vision changes: Active, recurrent  # Fatigue, chronic    Patient relates waxing and waning, less intense, but frequently occurring right thigh vision changes and obstruction.  Patient has tolerated increased dose of methotrexate since June 2024 without clear-cut adverse effects.    Data: On December 27, 2024, creatinine, transaminases, CRP, CBC were all normal.  In February 2024, TSH was normal.  On June 6, 2024 rheumatoid factor, cyclic citrullinated peptide antibodies, ARIS, EDWARD panel including SSA and SSB, treponema antibodies, TPMT were all negative or normal.    Imaging:  Chest x-ray March 2023 normal.    Discussion: Patient reports increasingly frequent ocular symptoms, right greater than left eye.  Recent ophthalmology evaluations have documented recurrent iritis.  Inflammatory eye disease has occurred and spite of maximum dose (25 mg weekly) methotrexate for the last 6 months.  I am in agreement with Dr. Rich that combination immunotherapy will increase chances of improved iritis/uveitis control.  I recommend continuing methotrexate at current dose, but adding adalimumab.  Continue topical therapy as directed by ophthalmology.    We discussed in clinic:    Diagnosis:  1.  Recurrent uveitis/iritis, right eye, incompletely controlled per ophthalmology.    Plan:  Start adalimumab 40 mg subcutaneously every other week.  [facility] pharmacy to contact and assist with prior authorization.  2.  Continue methotrexate 25 mg (10 tablets) once weekly.  May split the dose if queasiness, stomach upset with full dose. While on methotrexate:   -- Check blood tests every 3-4 months (AST/ALT, Albumin, CBC with platelets)   -- Limit alcohol  "intake to 2 drinks weekly; use folate 1 mg daily.  --Tylenol 500-1000 mg can be used as needed up to three times daily for nausea/headache associated with dosing.    3.  Laboratory testing for hepatitis B and C, TB today as well as methotrexate monitoring labs.  4.  MTM t referral for prior authorization and prescription of adalimumab.    RTC 6 mo    Colby Calvo MD  Staff Rheumatologist, [facility]    On the day of the encounter, a total of 33 minutes was spent in chart review, and in counseling and coordination of care, regarding the patient's complex medical problem of uveitis, right eye, high risk medication.    The longitudinal plan of care for the diagnosis(es)/condition(s) as documented were addressed during this visit. Due to the added complexity in care, I will continue to support Debi in the subsequent management and with ongoing continuity of care.    Orders Placed This Encounter   Procedures    Hepatitis B core antibody    Hepatitis B surface antigen    Hepatitis C antibody    CBC with platelets    AST    ALT    Albumin level    Creatinine    Med Therapy Management Referral    Quantiferon TB Gold Plus              History of Present Illness:   Debi Reed presents for follow-up of iritis.  She was last seen in September 2024, impression at that time was of improved but inadequately controlled right eye recurrent iritis.  Recommendation was to increase methotrexate to 25 mg once weekly.    Uveitis/iritis: Detailed ophthalmologic history is recorded in the note from Dr. Rich on March 11, 2024:    \"...Her ophthalmic history began in 7/01/2010 with cataract surgery in the left eye performed by Dr. Lombardi of [facility]. She had a retained lens fragment which was removed 1 week postoperatively on 7/8/210. She notes there may have been ocular hypertension in that eye, and describes needing a procedure to reduce the pressure with a needle. Shortly thereafter, she reports experiencing a " "retinal tear in the left eye which was repaired by laser on 7/19/2010 which was further complicated by retinal detachment. This was repaired by another surgeon at [facility] (Dr. Sukhwinder Langley to pts recollection, records unavailable)     She underwent cataract surgery in the right eye on 11/18/2010. She also recalls having a retinal detachment in the right eye which was repaired by laser retinopexy with Dr. Saeed, of [facility] at the time to her recollection.     She does not recall having had a diagnosis of uveitis at or before the time of her cataract surgery. Uveitis has been restricted to the right eye. The documentation she provides from [facility] dates back to 2012 and indicates recurrent episodes of blurry vision with anterior chamber cell and flare and elevated right sided pressures up to mid 20s in the right eye. This was treated at first with durezol and combigan and more recently with prednisolone. Examinations indicate her PCIOL was consistently well centered in each eye.      Flares begin with a feeling of dry eye followed by dark grey translucent material in her right eye vision most noticeable when she looks at lights, sometimes there is pain when she looks at lights. When this occurs she will self treat with prednisolone, tropicamide. If the pain is severe she will got to the eye clinic and usually starts combigan. She has experienced about 15 flares this year.     Recent flare in December 2023 was so severe that it would not respond to her normal regimen of prednisolone drops. She was unable to work. She saw Dr. Lombardi and received a steroid injection (kenalog injection on 1/2/2024) to the right eye in conjunction with steroid drops finally this got better. She did have issues with elevated intraocular pressure....\"    Interval history March 6, 2025      Patient was last seen by Dr. Rich on February 18, 2025.  Impression was of mild iritis inflammation in the right eye, stable.  Recommendation was " "to continue methotrexate 25 mg once weekly and to utilize topical corticosteroid drops.  Approval was given to consider addition of tumor necrosis factor inhibitor.    Today she reports symptoms of recurrent eye flare with R eye cloudy vision and \"points\" of light. She has started program of topical therapy per Dr. Rich. She reports feeling dry/irritated eyes during iritis Rx; nighttime occlusive ointment helps.    She takes methotrexate 10 tabs weekly since September 2024. She remains tired a lot, and wonders whether methotrexate is contributing. She notes possible association with methotrexate of fatigue. No mouth sores or GI AE.    She has L sided neck/arm pain and R hip pain, chronic.   She is seeing a masseuse with some benefit.    Interval history September 4, 2024:    Patient was last seen by Dr. Rich on July 15, 2024.  Impression was of recurrent iritis of right eye, improved but still active despite methotrexate use.  Recommendation was to continue oral methotrexate and to use a 2-week course of prednisolone drops.    Today, she notes that she has continued to have \"flares\" of iritis. She notes \"cottony cloud\" over the R ey feet options There is also increased intraocular pressure.    Feet are stiff in the mornings, and her skin is \"reactive\" to scratches and other minor trauma.    She takes methotrexate 8 tabs weekly since June 2024. She is tired a lot, and wonders whether methotrexate is contributing. She notes possible association with methotrexate of fatigue.     She does note that sleep is generally not refreshing; she is tired upon awakening, and she is ready to fall asleep at the conclusion of each day. She has discussed with psychiatry, and there has been discussion about \"uppers\" and \"downers\"    Initial history June 6, 2024     Patient was seen by Dr. Rich in May 2024.  Impression was of recurrent iritis.  Right eye iritis was judged active.  History of recent methotrexate ramp-up was " "reviewed. recommendation was to continue methotrexate 15 mg weekly.     She has had iritis in R eye for \"more than 10 years\"  She had been managed with topicals until .  She had \"whiteout\" of r eye vision in late ; had intraocular injection and iincreased topicals Rx.  She learned from Dr. Rich in 3-2024 that cells were still present despite visual improvement.  Methotrexate  She had another flare 2weeks ago, worsening right after followup with Ophtho.      R leg and foot swelled with pain and pitting edema: DVT workup negative. Skin was sensitive. Concern was for EN given skin discoloration, non-traumatic bruising on R leg.  Notes intermittent itchy rashes on her hands, possibly associated with frequent hand-washing at work.     + hot flash type symptoms  + bladder and bowel urgency intermittent. Occasional 1-2 days of liquid stools, sudden onset 2 times monthly. No blood/+ mucus.                Review of Systems:     Constitutional: negative  Skin: negative  Eyes: negative  Ears/Nose/Throat: negative  Respiratory: No shortness of breath, dyspnea on exertion, cough, or hemoptysis  Cardiovascular: negative  Gastrointestinal: negative  Genitourinary: negative  Musculoskeletal: negative  Neurologic: negative  Psychiatric: negative  Hematologic/Lymphatic/Immunologic: negative  Endocrine: negative         Active Problem List:     Patient Active Problem List    Diagnosis Date Noted    Mixed hyperlipidemia 2024     Priority: Medium     Formatting of this note might be different from the original.   Last Lipids:   Chol: 2024 243    293   HDL: 2024 37   Chol/HDL Ratio: 2024 6.57   LDL: 2024 147   LDL DIRECT: . No results found in past 5 years   Non-HDL: 2024 206      Uveitis of right eye 2024     Priority: Medium    Hypothyroidism 2003     Priority: Medium     Hypothyroidism Acquired              Past Medical History:     Past Medical History:   Diagnosis " Date    Hypothyroidism     Recurrent acute iridocyclitis of right eye     Sinusitis, chronic      Past Surgical History:   Procedure Laterality Date    CATARACT IOL, RT/LT Bilateral 11/18/2010    LASER YAG CAPSULOTOMY Left 02/04/2016    Procedure: LASER YAG CAPSULOTOMY;  Surgeon: Obie Lombardi MD;  Location: [facility]    RETINAL REATTACHMENT Right     shortly after cataract surgeries    VITRECTOMY PARSPLANA, SCLERAL BUCKLE, COMBINED Left     YAG CAPSULOTOMY OS (LEFT EYE)              Social History:     Social History     Socioeconomic History    Marital status:      Spouse name: Not on file    Number of children: Not on file    Years of education: Not on file    Highest education level: Not on file   Occupational History    Not on file   Tobacco Use    Smoking status: Never    Smokeless tobacco: Never   Substance and Sexual Activity    Alcohol use: Not Currently    Drug use: Never    Sexual activity: Not on file   Other Topics Concern    Not on file   Social History Narrative    Not on file     Social Drivers of Health     Financial Resource Strain: Low Risk  (2/26/2025)    [facility]    Financial Resource Strain     Difficulty of Paying Living Expenses: 3     Difficulty of Paying Living Expenses: Not on file   Food Insecurity: No Food Insecurity (2/25/2025)    [facility] from [facility]    Food Insecurity     Do you worry your food will run out before you are able to buy more?: 1   Transportation Needs: No Transportation Needs (2/25/2025)    [facility] from [facility]    Transportation Needs     Does lack of transportation keep you from medical appointments?: 1     Does lack of transportation keep you from work, meetings or getting things that you need?: 1   Physical Activity: Not on file   Stress: Not on file   Social Connections: Socially Integrated (2/25/2025)    Received from [facility] [facility]  [employer] [facility]    Social Connections     Do you often feel lonely or isolated from those around you?: 0   Interpersonal Safety: Not on file   Housing Stability: Low Risk  (2/25/2025)    Received from [facility]    Housing Stability     What is your housing situation today?: 1          Family History:     Family History   Problem Relation Age of Onset    Glaucoma No family hx of     Macular Degeneration No family hx of             Allergies:     Allergies   Allergen Reactions    Morphine Nausea and Other (See Comments)     Auditory hallucinations.    Adhesive Tape Itching and Rash    [place] Rash    Latex Rash    Nickel Rash            Medications:     Current Outpatient Medications   Medication Sig Dispense Refill    acetaminophen (TYLENOL) 500 MG tablet Take 1,000 mg by mouth as needed for pain.      brimonidine-timolol (COMBIGAN) 0.2-0.5 % ophthalmic solution Right eye: 1 drop 3x/day. Left eye: 1 drop daily 10 mL 5    clonazePAM (KLONOPIN) 1 MG tablet Take 1-2 tablets (1-2 mg) by mouth nightly as needed for anxiety 10 tablet 0    dorzolamide (TRUSOPT) 2 % ophthalmic solution Place 1 drop into the right eye 3 times daily. 10 mL 5    erythromycin (ROMYCIN) 5 MG/GM ophthalmic ointment Place 0.5 inches into the right eye 2 times daily. 3.5 g 4    folic acid (FOLVITE) 1 MG tablet Take 1 tablet (1 mg) by mouth daily. 90 tablet 3    ibuprofen (ADVIL/MOTRIN) 200 MG capsule Take by mouth as needed.      latanoprost (XALATAN) 0.005 % ophthalmic solution Place 1 drop into both eyes at bedtime. 2.5 mL 5    Levothyroxine Sodium (SYNTHROID PO) Take 200 mcg by mouth daily      LORazepam (ATIVAN PO) Take 0.5 mg by mouth. 1 - 3 tablets x / day      meclizine (ANTIVERT) 25 MG tablet Take 25 mg by mouth as needed      methotrexate 2.5 MG tablet Take 10 tablets (25 mg) by mouth every 7 days. 120 tablet 3    ondansetron (ZOFRAN ODT) 4 MG ODT tab Place 4 mg under the tongue as needed       prednisoLONE acetate (PRED FORTE) 1 % ophthalmic suspension Place 1-2 drops into both eyes daily. 10 mL 2    Sertraline HCl (ZOLOFT PO) Take 200 mg by mouth      methotrexate 2.5 MG tablet Take 8 tablets (20 mg) by mouth every 7 days Please also take Folic Acid vitamin daily (Patient not taking: Reported on 3/6/2025) 96 tablet 3     She has used stable dosing of clonazepam for decades.       Physical Exam:   Blood pressure 104/69, pulse 56, weight 86.9 kg (191 lb 8 oz).  Wt Readings from Last 6 Encounters:   03/06/25 86.9 kg (191 lb 8 oz)   06/06/24 88 kg (194 lb)   01/01/20 77.1 kg (170 lb)     Body mass index is 26.71 kg/m .  Constitutional: well-developed, appearing stated age; cooperative  Eyes: nl EOM, PERRLA, vision, conjunctiva, sclera  ENT: nl external ears, nose, hearing, lips, teeth, gums, throat  No mucous membrane lesions, normal saliva pool  Neck: no mass or thyroid enlargement  Resp: Breathing is unlabored.  MS: No synovitis or altered range of motion in the hands, wrists, elbows today.  Neuro: nl cranial nerves, strength, sensation, DTRs.   Psych: nl judgement, orientation, memory, affect.         Data:     @      Latest Ref Rng & Units 6/6/2024     9:37 AM 9/4/2024     1:44 PM 12/27/2024     1:44 PM   RHEUM RESULTS   Albumin 3.5 - 5.2 g/dL 4.4  4.4  4.2    ALT 0 - 50 U/L 39  30  39    AST 0 - 45 U/L 29  25  33    ARIS interpretation Negative Negative      Creatinine 0.51 - 0.95 mg/dL 0.61  0.73  0.73    CRP Inflammation <5.00 mg/L <3.00  <3.00  <3.00    GFR Estimate >60 mL/min/1.73m2 >90  >90  >90    Hematocrit 35.0 - 47.0 % 37.9  35.3  35.2    Hemoglobin 11.7 - 15.7 g/dL 12.7  12.1  12.3    WBC 4.0 - 11.0 10e3/uL 5.0  5.2  5.0    RBC Count 3.80 - 5.20 10e6/uL 4.15  3.81  3.85    RDW 10.0 - 15.0 % 13.4  13.0  13.3    MCHC 31.5 - 36.5 g/dL 33.5  34.3  34.9    MCV 78 - 100 fL 91  93  91    Platelet Count 150 - 450 10e3/uL 193  176  186        Rheumatoid Factor   Date Value Ref Range Status    06/06/2024 <10 <14 IU/mL Final   ,   Cyclic Citrullinated Peptide Antibody IgG   Date Value Ref Range Status   06/06/2024 1.0 <7.0 U/mL Final     Comment:     Negative   ,  ,  ,   SSA (Ro) Antibody IgG   Date Value Ref Range Status   03/13/2024 Negative Negative Final     SSB (La) Antibody IgG   Date Value Ref Range Status   03/13/2024 Negative Negative Final   ,  ,   ARIS interpretation   Date Value Ref Range Status   06/06/2024 Negative Negative Final     Comment:       Negative:              <1:40  Borderline Positive:   1:40 - 1:80  Positive:              >1:80   ,  ,  ,  ,  ,  ,  ,  ,  ,  ,  ,  ,  ,  ,  ,  ,  ,  ,  ,  ,  ,  ,  ,  ,  ,  ,  ,  ,  ,  ,  ,  ,  ,  ,  ,  ,  ,  ,

## 2025-03-06 ENCOUNTER — LAB (OUTPATIENT)
Dept: LAB | Facility: CLINIC | Age: 55
End: 2025-03-06
Payer: COMMERCIAL

## 2025-03-06 ENCOUNTER — OFFICE VISIT (OUTPATIENT)
Dept: RHEUMATOLOGY | Facility: CLINIC | Age: 55
End: 2025-03-06
Payer: COMMERCIAL

## 2025-03-06 VITALS
SYSTOLIC BLOOD PRESSURE: 104 MMHG | HEART RATE: 56 BPM | DIASTOLIC BLOOD PRESSURE: 69 MMHG | BODY MASS INDEX: 26.71 KG/M2 | WEIGHT: 191.5 LBS

## 2025-03-06 DIAGNOSIS — H20.9 UVEITIS OF RIGHT EYE: ICD-10-CM

## 2025-03-06 DIAGNOSIS — Z79.899 HIGH RISK MEDICATION USE: ICD-10-CM

## 2025-03-06 DIAGNOSIS — H20.021 RECURRENT IRITIS OF RIGHT EYE: ICD-10-CM

## 2025-03-06 DIAGNOSIS — Z79.631 LONG TERM METHOTREXATE USER: ICD-10-CM

## 2025-03-06 DIAGNOSIS — H20.9 UVEITIS OF RIGHT EYE: Primary | ICD-10-CM

## 2025-03-06 LAB
ALBUMIN SERPL BCG-MCNC: 4.4 G/DL (ref 3.5–5.2)
ALT SERPL W P-5'-P-CCNC: 102 U/L (ref 0–50)
AST SERPL W P-5'-P-CCNC: 49 U/L (ref 0–45)
CREAT SERPL-MCNC: 0.63 MG/DL (ref 0.51–0.95)
EGFRCR SERPLBLD CKD-EPI 2021: >90 ML/MIN/1.73M2
ERYTHROCYTE [DISTWIDTH] IN BLOOD BY AUTOMATED COUNT: 13 % (ref 10–15)
HBV CORE AB SERPL QL IA: NONREACTIVE
HBV SURFACE AG SERPL QL IA: NONREACTIVE
HCT VFR BLD AUTO: 36.1 % (ref 35–47)
HCV AB SERPL QL IA: NONREACTIVE
HGB BLD-MCNC: 12.3 G/DL (ref 11.7–15.7)
MCH RBC QN AUTO: 31.7 PG (ref 26.5–33)
MCHC RBC AUTO-ENTMCNC: 34.1 G/DL (ref 31.5–36.5)
MCV RBC AUTO: 93 FL (ref 78–100)
PLATELET # BLD AUTO: 223 10E3/UL (ref 150–450)
RBC # BLD AUTO: 3.88 10E6/UL (ref 3.8–5.2)
WBC # BLD AUTO: 5.4 10E3/UL (ref 4–11)

## 2025-03-06 RX ORDER — METHOTREXATE 2.5 MG/1
25 TABLET ORAL
Qty: 120 TABLET | Refills: 3 | Status: SHIPPED | OUTPATIENT
Start: 2025-03-06

## 2025-03-06 RX ORDER — FOLIC ACID 1 MG/1
1 TABLET ORAL DAILY
Qty: 90 TABLET | Refills: 3 | Status: SHIPPED | OUTPATIENT
Start: 2025-03-06

## 2025-03-06 ASSESSMENT — PAIN SCALES - GENERAL: PAINLEVEL_OUTOF10: SEVERE PAIN (8)

## 2025-03-06 NOTE — NURSING NOTE
Chief Complaint   Patient presents with    RECHECK     Recurrent iritis of right eye       Vitals:    03/06/25 0827   BP: 104/69   BP Location: Left arm   Patient Position: Sitting   Cuff Size: Adult Large   Pulse: 56   Weight: 86.9 kg (191 lb 8 oz)       Body mass index is 26.71 kg/m .    Lisa Guy, [facility]

## 2025-03-06 NOTE — PATIENT INSTRUCTIONS
Diagnosis:  1.  Recurrent uveitis/iritis, right eye, incompletely controlled per ophthalmology.      Plan:  Start adalimumab 40 mg subcutaneously every other week.  [facility] pharmacy to contact and assist with prior authorization.  2.  Continue methotrexate 25 mg (10 tablets) once weekly.  May split the dose if queasiness, stomach upset with full dose. While on methotrexate:   -- Check blood tests every 3-4 months (AST/ALT, Albumin, CBC with platelets)   -- Limit alcohol intake to 2 drinks weekly; use folate 1 mg daily.  --Tylenol 500-1000 mg can be used as needed up to three times daily for nausea/headache associated with dosing.    3.  Laboratory testing for hepatitis B and C, TB today as well as methotrexate monitoring labs.

## 2025-03-11 ENCOUNTER — VIRTUAL VISIT (OUTPATIENT)
Dept: PHARMACY | Facility: CLINIC | Age: 55
End: 2025-03-11
Attending: INTERNAL MEDICINE
Payer: COMMERCIAL

## 2025-03-11 DIAGNOSIS — H40.059 OCULAR HYPERTENSION, UNSPECIFIED LATERALITY: ICD-10-CM

## 2025-03-11 DIAGNOSIS — E03.9 HYPOTHYROIDISM, UNSPECIFIED TYPE: Chronic | ICD-10-CM

## 2025-03-11 DIAGNOSIS — F41.9 ANXIETY: ICD-10-CM

## 2025-03-11 DIAGNOSIS — H20.9 UVEITIS OF RIGHT EYE: Primary | ICD-10-CM

## 2025-03-11 DIAGNOSIS — Z71.85 VACCINE COUNSELING: ICD-10-CM

## 2025-03-11 DIAGNOSIS — H20.9 IRIDOCYCLITIS: ICD-10-CM

## 2025-03-12 ENCOUNTER — TELEPHONE (OUTPATIENT)
Dept: RHEUMATOLOGY | Facility: CLINIC | Age: 55
End: 2025-03-12
Payer: COMMERCIAL

## 2025-03-12 NOTE — PATIENT INSTRUCTIONS
"Recommendations from today's MTM visit:                                                       1. We are working on obtaining insurance coverage for Humira. Once this is approved the pharmacy will call you to set up delivery so you can start 40 mg (1 injection) every 14 days. If your insurance requires a biosimilar we will notify you.     2. A common side effect of Humira is injection site reactions (red, raised, itchy spot at injection site). You can use hydrocortisone cream and ice to treat these reactions if they occur.     3. Vaccine recommendations: Consider completing your 2nd Shingrix (shingles) dose and pneumonia vaccine (Prevnar 20).    Follow-up: Return in about 3 months (around 6/11/2025) for MTM Pharmacist Visit.    It was great speaking with you today.  I value your experience and would be very thankful for your time in providing feedback in our clinic survey. In the next few days, you may receive an email or text message from [facility] with a link to a survey related to your  clinical pharmacist.\"     To schedule another MTM appointment, please call the clinic directly or you may call the MTM scheduling line at [phone].    My Clinical Pharmacist's contact information:                                                      Please feel free to contact me with any questions or concerns you have.      Rebecca Longoria, PharmD  Medication Therapy Management Pharmacist  [facility]  Phone: [phone]     "

## 2025-03-12 NOTE — PROGRESS NOTES
Medication Therapy Management (MTM) Encounter    ASSESSMENT:                            Medication Adherence/Access: No issues identified.    Uveitis/Ocular Hypertension/Iridocyclitis: Provided education on Humira today including dosing, general administration, side effects (both common/serious), precautions, monitoring and time to efficacy. Discussed data on malignancy and risk of serious infection in depth. Encouraged indicated non-live vaccines and avoidance of live vaccines. Discussed potential need to hold therapy in the setting of signs/symptoms of active infection. Reviewed insurance may require an adalimumab biosimilar. Will contact patient with specific copay card information after determining which product is covered. Encouraged her to contact the rheumatology clinic in the event she has questions on this. Would benefit from starting Humira once it arrives and using 40 mg every 14 days as directed.     Vaccines: Per ACIP recommendations, eligible to receive 2nd Shingrix vaccine dose and Prevnar 20.    Hypothyroidism: Stable.     Anxiety: Stable.    PLAN:                            1. We are working on obtaining insurance coverage for Humira. Once this is approved the pharmacy will call you to set up delivery so you can start 40 mg (1 injection) every 14 days. If your insurance requires a biosimilar we will notify you.     2. A common side effect of Humira is injection site reactions (red, raised, itchy spot at injection site). You can use hydrocortisone cream and ice to treat these reactions if they occur.     3. Vaccine recommendations: Consider completing your 2nd Shingrix (shingles) dose and pneumonia vaccine (Prevnar 20).    Follow-up: Return in about 3 months (around 6/11/2025) for MTM Pharmacist Visit.    SUBJECTIVE/OBJECTIVE:                          Debi Reed is a 54 year old female seen for an initial visit. She was referred to me from Colby Calvo MD.      Reason for visit: Adalimumab  education    Allergies/ADRs: Reviewed in chart  Past Medical History: Reviewed in chart  Tobacco: She reports that she has never smoked. She has never used smokeless tobacco.  Alcohol: Less than 1 beverage / month - occasional use    Medication Adherence/Access: no issues reported.    Uveitis/Ocular Hypertension/Iridocyclitis:   Methotrexate 25 mg weekly  Folic Acid 1 mg daily  Latanoprost 0.005% 1 drop in both eyes  Prednisolone 1% 1-2 drops in both eyes daily  Brimonidine/Timolol 0.2/0.5% three times daily in right eye, daily in left eye  Dorzolamide 2% three times daily in right eye  Erythromycin 5 mg ointment twice daily     Reports she has been doing well with methotrexate - does have fatigue for a few days after weekly dose but finds this tolerable. Was told her uveitis needs an additional treatment and addition of TNF inhibitor was recommended. Open to starting this but does want to discuss side effects prior to starting.    Pre-Biologic Screening:   Hep B Surface Antibody Non-reactive (3/6/25)    Hep B Core Antibody  Non-reactive (3/6/25)    Hep B Surface Antigen Non-reactive (3/6/25)    Hep C Antibody  Non-reactive (3/6/25)    HIV Antigen Antibody No record of completion   Quantiferon TB Gold Negative (3/6/25)      Last lab monitoring completed: 3/6/25    Vaccines:  Immunization History   Covid-19 vaccine (8302-0647 version)  Up-to-date   Influenza (annual) Up-to-date, avoid live FluMist   Pneumococcal Due to receive PCV-20   Tetanus/Tdap  Up-to-date   Shingrix Due to receive 2nd dose   All patients on biologics should avoid live vaccines (varicella/VZV, intranasal influenza, MMR, or yellow fever vaccine (if traveling))        Hypothyroidism   Levothyroxine 200 mcg daily.   Patient is having the following symptoms: none.      Anxiety:   Sertraline 200 mg daily  Lorazepam 0.5 mg three times daily as needed   Clonazepam 1-2 mg nightly as needed     Feels medications are working well - no side effects or  concerns noted.    Today's Vitals: There were no vitals taken for this visit.  ----------------    I spent 43 minutes with this patient today. All changes were made via collaborative practice agreement with Colby Calvo.     A summary of these recommendations was sent via [employer].    Rebecca Longoria, PharmD  Medication Therapy Management Pharmacist  [facility]  Phone: [phone]    Telemedicine Visit Details  The patient's medications can be safely assessed via a telemedicine encounter.  Type of service:  Telephone visit  Originating Location (pt. Location): Home    Distant Location (provider location):  On-site  Start Time: 9:30 AM  End Time: 10:13 AM     Medication Therapy Recommendations  Uveitis of right eye   1 Current Medication: methotrexate 2.5 MG tablet   Current Medication Sig: Take 10 tablets (25 mg) by mouth every 7 days.   Rationale: Synergistic therapy - Needs additional medication therapy - Indication   Recommendation: Start Medication - HUMIRA *CF* 40 MG/0.4ML Pskt - 40 mg every 14 days   Status: Accepted per CPA   Identified Date: 3/11/2025 Completed Date: 3/11/2025         Vaccine counseling   1 Rationale: Preventive therapy - Needs additional medication therapy - Indication   Recommendation: Start Medication - Shingrix 50 MCG/0.5ML Susr   Status: Accepted - no CPA Needed   Identified Date: 3/11/2025 Completed Date: 3/11/2025

## 2025-03-12 NOTE — TELEPHONE ENCOUNTER
PA Initiation    Medication: SIMLANDI (2 PEN) 40 MG/0.4ML SC AJKT  Insurance Company: Other (see comments)Comment:  MARINO MORA  Pharmacy Filling the Rx:    Filling Pharmacy Phone:    Filling Pharmacy Fax:    Start Date: 3/12/2025

## 2025-03-13 ENCOUNTER — MYC MEDICAL ADVICE (OUTPATIENT)
Dept: PHARMACY | Facility: CLINIC | Age: 55
End: 2025-03-13
Payer: COMMERCIAL

## 2025-03-19 ENCOUNTER — OFFICE VISIT (OUTPATIENT)
Dept: OPHTHALMOLOGY | Facility: CLINIC | Age: 55
End: 2025-03-19
Attending: OPHTHALMOLOGY
Payer: COMMERCIAL

## 2025-03-19 DIAGNOSIS — H40.043 STEROID RESPONDER, BILATERAL: ICD-10-CM

## 2025-03-19 DIAGNOSIS — Z98.890 HISTORY OF VITRECTOMY: ICD-10-CM

## 2025-03-19 DIAGNOSIS — Z98.890 HISTORY OF LASER PHOTOCOAGULATION OF RETINA: ICD-10-CM

## 2025-03-19 DIAGNOSIS — H40.051 OCULAR HYPERTENSION, RIGHT EYE: Primary | ICD-10-CM

## 2025-03-19 DIAGNOSIS — H20.21 LENS-INDUCED IRIDOCYCLITIS, RIGHT EYE: ICD-10-CM

## 2025-03-19 DIAGNOSIS — H20.21 LENS-INDUCED IRIDOCYCLITIS, RIGHT EYE: Primary | ICD-10-CM

## 2025-03-19 DIAGNOSIS — H20.00 ACUTE IRITIS OF LEFT EYE: ICD-10-CM

## 2025-03-19 DIAGNOSIS — H20.021 RECURRENT IRITIS OF RIGHT EYE: ICD-10-CM

## 2025-03-19 DIAGNOSIS — Z79.899 HIGH RISK MEDICATION USE: ICD-10-CM

## 2025-03-19 PROCEDURE — 92083 EXTENDED VISUAL FIELD XM: CPT | Performed by: OPHTHALMOLOGY

## 2025-03-19 PROCEDURE — 92133 CPTRZD OPH DX IMG PST SGM ON: CPT | Performed by: OPHTHALMOLOGY

## 2025-03-19 PROCEDURE — 99212 OFFICE O/P EST SF 10 MIN: CPT | Performed by: OPHTHALMOLOGY

## 2025-03-19 PROCEDURE — 99214 OFFICE O/P EST MOD 30 MIN: CPT | Performed by: OPHTHALMOLOGY

## 2025-03-19 RX ORDER — TAFLUPROST OPTHALMIC 0 MG/.3ML
1 SOLUTION/ DROPS OPHTHALMIC AT BEDTIME
Qty: 15 EACH | Refills: 3 | Status: SHIPPED | OUTPATIENT
Start: 2025-03-19

## 2025-03-19 RX ORDER — BRIMONIDINE TARTRATE 1 MG/ML
1 SOLUTION/ DROPS OPHTHALMIC 2 TIMES DAILY
Qty: 15 ML | Refills: 3 | Status: SHIPPED | OUTPATIENT
Start: 2025-03-19

## 2025-03-19 RX ORDER — DORZOLAMIDE HYDROCHLORIDE AND TIMOLOL MALEATE PRESERVATIVE FREE 20; 5 MG/ML; MG/ML
1 SOLUTION/ DROPS OPHTHALMIC 2 TIMES DAILY
Qty: 60 EACH | Refills: 3 | Status: SHIPPED | OUTPATIENT
Start: 2025-03-19

## 2025-03-19 ASSESSMENT — VISUAL ACUITY
METHOD: SNELLEN - LINEAR
CORRECTION_TYPE: GLASSES
OS_CC+: +2
OD_CC+: +2
OD_CC: 20/40
OD_CC: 20/40
OS_CC: 20/25
METHOD: SNELLEN - LINEAR
CORRECTION_TYPE: GLASSES
OD_PH_CC+: +2
OS_CC+: +2
OS_CC: 20/25
OD_PH_CC: 20/40

## 2025-03-19 ASSESSMENT — REFRACTION_WEARINGRX
OD_AXIS: 010
OS_AXIS: 120
SPECS_TYPE: PAL
OS_CYLINDER: +1.00
OS_ADD: +2.75
OS_ADD: +2.75
OS_AXIS: 120
OD_ADD: +2.75
OD_AXIS: 010
SPECS_TYPE: PAL
OS_SPHERE: -1.00
OD_ADD: +2.75
OS_CYLINDER: +1.00
OD_CYLINDER: +0.50
OD_SPHERE: -2.25
OD_CYLINDER: +0.50
OS_SPHERE: -1.00
OD_SPHERE: -2.25

## 2025-03-19 ASSESSMENT — CONF VISUAL FIELD
OS_SUPERIOR_TEMPORAL_RESTRICTION: 0
OD_INFERIOR_TEMPORAL_RESTRICTION: 0
OD_SUPERIOR_TEMPORAL_RESTRICTION: 0
METHOD: COUNTING FINGERS
OD_SUPERIOR_NASAL_RESTRICTION: 0
OS_SUPERIOR_NASAL_RESTRICTION: 0
OS_NORMAL: 1
OD_NORMAL: 1
OS_SUPERIOR_NASAL_RESTRICTION: 0
OS_INFERIOR_NASAL_RESTRICTION: 0
OD_SUPERIOR_TEMPORAL_RESTRICTION: 0
OS_INFERIOR_TEMPORAL_RESTRICTION: 0
OS_NORMAL: 1
OS_INFERIOR_TEMPORAL_RESTRICTION: 0
OD_NORMAL: 1
OD_INFERIOR_NASAL_RESTRICTION: 0
OS_SUPERIOR_TEMPORAL_RESTRICTION: 0
OS_INFERIOR_NASAL_RESTRICTION: 0
OD_INFERIOR_TEMPORAL_RESTRICTION: 0
OD_SUPERIOR_NASAL_RESTRICTION: 0
OD_INFERIOR_NASAL_RESTRICTION: 0

## 2025-03-19 ASSESSMENT — SLIT LAMP EXAM - LIDS
COMMENTS: NORMAL
COMMENTS: NORMAL
COMMENTS: SUBTLE PTOSIS
COMMENTS: SUBTLE PTOSIS

## 2025-03-19 ASSESSMENT — CUP TO DISC RATIO
OD_RATIO: 0.55
OS_RATIO: 0.5
OD_RATIO: 0.55
OS_RATIO: 0.5

## 2025-03-19 ASSESSMENT — TONOMETRY
OD_IOP_MMHG: 12
IOP_METHOD: TONOPEN
OD_IOP_MMHG: 12
IOP_METHOD: TONOPEN
OS_IOP_MMHG: 09
OS_IOP_MMHG: 09

## 2025-03-19 ASSESSMENT — EXTERNAL EXAM - RIGHT EYE
OD_EXAM: NORMAL
OD_EXAM: NORMAL

## 2025-03-19 ASSESSMENT — EXTERNAL EXAM - LEFT EYE
OS_EXAM: NORMAL
OS_EXAM: NORMAL

## 2025-03-19 NOTE — PATIENT INSTRUCTIONS
Continue these medications unchanged: Oral Methotrexate  25 mg (10 pills), Folic acid 1 mg daily  Regarding adalimumab (Humira biosimilar), okay to consider holding off for now until right eye gets addressed as this may help inflammation in that eye. The left eye has milder flares and may do well just drops for now  Okay to continue drops for now unchanged (Dr. Lara has kindly agreed to try to preservative free drops):  Prednisolone (pink cap) back to once daily in both eyes, Latanoprost (teal cap) at bedtime in both eyes, Dorzolamide (orange cap) daily in both eyes, Combigan (dark blue cap) daily in both eyes. Okay to stay off drops on Saturdays

## 2025-03-19 NOTE — Clinical Note
Randy Vaughn: Debi would like to consider surgery with you for the right eye. Let me know how I can help. Thanks so much!

## 2025-03-19 NOTE — LETTER
3/19/2025       RE: Debi Reed  [address]  [facility] [id number]     Dear Colleague,    Thank you for referring your patient, Debi Reed, to the [facility] [place] at [facility]. Please see a copy of my visit note below.    Chief Complaint/Presenting Concern:  Uveitis follow up    Interval History of Present Ocular Illness:  Debi Reed is a 54 year old patient who returns for follow up of her iritis of each eye.Last visit we discussed continuing systemic treatment unchanged, possibly starting Humira and awaited this visit.    Debi messaged about one definite and one possible mild flare necessitating modification of drops.    Debi saw Dr. Lara today who felt there may be some component of lens related inflammation right eye and discussed options for eye dryness including preservative free drops possibly scleral lenses    Interval Updates to Medical/Family/Social History:    Saw Dr. Calvo and got approved for adalimumab biosimilar, although not yet started. Slightly elevated ALT and will get rechecked soon.  Still working on mom's living arrangements.    Relevant Review of Systems Updates:  As above    Labs: 3/6/25: AST 49 and ALT elevated 105     Current eye related medications:   Systemic:  Oral Methotrexate  25 mg (10 pills), Folic acid 1 mg daily, not yet started Adalimumab biosimilar  Drops: Prednisolone (pink cap) back to once daily in both eyes, Latanoprost (teal cap) at bedtime in both eyes, Dorzolamide (orange cap) daily in both eyes, Combigan (dark blue cap) daily in both eyes. No drops on Saturdays (Methotrexate days)    Imaging from Dr. Lara's visit  OCT macula March 19, 2025  Right eye:Myopic contour, no fluid  Left eye: Myopic contour, no fluid    Assessment:     1. Lens-induced iridocyclitis, right eye (Primary)  Some improvement but still active    2. Acute iritis of left eye  Improving as  well    3. Steroid responder, bilateral  IOP controlled today    4. High risk medication use  Oral Methotrexate  25 mg (10 pills), Folic acid 1 mg daily    Plan/Recommendations:    Discussed findings with patient. There is some improvement inflammation but it has not resolved. We discussed that there may be a component of lens related inflammation right eye and even if this may not be the case left eye, that consider lens repositioning/removal may provide benefit for the right eye. We will consider lens options with Dr. Lara.   Eye pressure is 12,9. Dr. Lara may consider something later if appropriate for pressure lowering.   Recommend additional testing when scheduled with Dr. Calvo  Continue these medications unchanged: Oral Methotrexate  25 mg (10 pills), Folic acid 1 mg daily  Regarding adalimumab (Humira biosimilar), okay to consider holding off for now until right eye gets addressed as this may help inflammation in that eye. The left eye has milder flares and may do well just drops for now  Okay to continue drops for now unchanged (Dr. Lara has kindly agreed to try to preservative free drops):  Prednisolone (pink cap) back to once daily in both eyes, Latanoprost (teal cap) at bedtime in both eyes, Dorzolamide (orange cap) daily in both eyes, Combigan (dark blue cap) daily in both eyes. Okay to stay off drops on Saturdays     RTC Dr. Chaparrita samuel [facility] eval    Dr. Lara for surgery timing. We will ask his team to reach to you     PHILL SHIPLEY    Physician Attestation    Attending Physician Attestation:  Complete documentation of historical and exam elements from today's encounter can be found in the full encounter summary report (not reduplicated in this progress note). I personally obtained the chief complaint(s) and history of present illness. I confirmed and edited as necessary the review of systems, past medical/surgical history, family history, social history, and examination findings as documented  by others; and I examined the patient myself. I personally reviewed the relevant tests, images, and reports as documented above. I formulated and edited as necessary the assessment and plan and discussed the findings and management plan with the patient and family members present at the time of this visit.  Dheeraj Rich M.D., Uveitis and Medical Retina, March 19, 2025           Again, thank you for allowing me to participate in the care of your patient.      Sincerely,    Dheeraj Rich MD  Uveitis and Medical Retina    Department of Ophthalmology & Visual Neurosciences  [facility]
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
## 2025-03-19 NOTE — NURSING NOTE
Chief Complaints and History of Present Illnesses   Patient presents with    evaluation of BE     Prednisolone every day BE  2 flare up RE since visit in Feb.  Latanoprost every day BE  Dorzolamide every day BE  Combigan every day BE  Veronica MONROY 9:16 AM March 19, 2025        Chief Complaint(s) and History of Present Illness(es)       evaluation of BE              Comments: Prednisolone every day BE  2 flare up RE since visit in Feb.  Latanoprost every day BE  Dorzolamide every day BE  Combigan every day BE  Veronica MONROY 9:16 AM March 19, 2025

## 2025-03-19 NOTE — Clinical Note
Thanks for another interesting case.  I will let you know when she is scheduled and we can coordinate jany-op drops/injections

## 2025-03-19 NOTE — PROGRESS NOTES
HPI       evaluation of BE     Additional comments: Prednisolone every day BE  2 flare up RE since visit in Feb.  Latanoprost every day BE  Dorzolamide every day BE  Combigan every day BE  Veronica Almaraz COA 9:16 AM March 19, 2025             Last edited by Veronica Almaraz on 3/19/2025  9:16 AM.          Review of systems for the eyes was negative other than the pertinent positives/negatives listed in the HPI.      Assessment & Plan      Debi Reed is a 54 year old female with the following diagnoses:   1. Ocular hypertension, right eye    2. Lens-induced iridocyclitis, right eye    3. Steroid responder, bilateral    4. Recurrent iritis of right eye    5. History of vitrectomy    6. History of laser photocoagulation of retina         Referral from Dr. Rich for Ocular hypertension v glaucoma right eye > left eye   H/o recurrent iritis right eye with mild chronic iritis left eye starting after her cataract extractions  Currently on MTMT for intraocular pressure and using Predforte daily in both eyes (On latanoprost, dorzolamide, Combigan)  Considering humira given ongoing inflammation with drops and methotrexate    Maximum intraocular pressure 44/25  Reports burning with dorz and Combigan >> latanoprost  Intraocular pressure 12/9 today  OCT Nerve fiber layer with nonspecific thinning and myopic nerves both eyes - overall stable  First visual field today with mild nonspecific scatter in both eyes     Reviewed previous UBM with evidence of lens tile and optic/Sommering contact with inferior iris  In the setting of new transillumination defect, I agree that UGH is likely contributing to the chronic recurrent iritis and ocular hypertension in the right eye.      Long discussion regarding treatment goals and options  Offered trial of Preservative free glaucoma drops and scleral lens consult v Selected laser trabeculoplasty (SLT) c goal to stop some intraocular pressure drops v Intraocular lens repositioning/exchange  +/- pars plana anterior vitrectomy (with iStent Infinite.     After exam and discussion with Dr. Rich today, she would like to proceed with surgery   Will switch to Zioptan at bedtime both eyes, PFCosopt twice a day right eye, Alphagan P twice a day right eye   Continue Predforte  per JY  Will consider scleral lens consult after surgery     Special equipment/needs:    Anesthesia:MAC with block  Dilation:Moderate  Iris expansion:Iris Prescott likely  Pseudoexfoliation: No pseudoexfoliation  Trypan Blue: No   Microinstruments  Endoscope  Remove Sommering v Intraocular lens repositioning v intraocular lens exchange (Akreos gortex)  iStent Infinite  Will coordinate perioperative steroid with JY  Return for calcs prior to surgery              Attending Physician Attestation:  Complete documentation of historical and exam elements from today's encounter can be found in the full encounter summary report (not reduplicated in this progress note).  I personally obtained the chief complaint(s) and history of present illness.  I confirmed and edited as necessary the review of systems, past medical/surgical history, family history, social history, and examination findings as documented by others; and I examined the patient myself.  I personally reviewed the relevant tests, images, and reports as documented above.  I formulated and edited as necessary the assessment and plan and discussed the findings and management plan with the patient and family. . Today with Debi Reed  and her friend, I reviewed the indications, risks, benefits, and alternatives of the proposed surgical procedure including, but not limited to, failure obtain the desired result  and need for additional surgery, bleeding, infection, loss of vision, loss of the eye, and the remote possibility of permanent damage to any organ system or death with the use of anesthesia.  I provided multiple opportunities for the questions, answered all questions to the  best of my ability, and confirmed that my answers and my discussion were understood.    - Stephen Lara MD

## 2025-03-19 NOTE — TELEPHONE ENCOUNTER
Prior Authorization Approval    Medication: SIMLANDI (2 PEN) 40 MG/0.4ML SC AJKT  Authorization Effective Date: 3/12/2025  Authorization Expiration Date: 9/8/2025  Approved Dose/Quantity:   Reference #: (EOC) ID: [id number]   Insurance Company: Other (see comments)Comment:  MARINO MORA  Expected CoPay: $    CoPay Card Available: Yes    Financial Assistance Needed:   Which Pharmacy is filling the prescription: [facility] - [address]  Pharmacy Notified: yes,documented and released new Rx  Patient Notified: yes, sent My Chart msg

## 2025-03-19 NOTE — PROGRESS NOTES
Chief Complaint/Presenting Concern:  Uveitis follow up    Interval History of Present Ocular Illness:  Debi Reed is a 54 year old patient who returns for follow up of her iritis of each eye.Last visit we discussed continuing systemic treatment unchanged, possibly starting Humira and awaited this visit.    Debi messaged about one definite and one possible mild flare necessitating modification of drops.    Debi saw Dr. Lara today who felt there may be some component of lens related inflammation right eye and discussed options for eye dryness including preservative free drops possibly scleral lenses    Interval Updates to Medical/Family/Social History:    Saw Dr. Calvo and got approved for adalimumab biosimilar, although not yet started. Slightly elevated ALT and will get rechecked soon.  Still working on Mom's living arrangements.    Relevant Review of Systems Updates:  As above    Labs: 3/6/25: AST 49 and ALT elevated 105     Current eye related medications:   Systemic:  Oral Methotrexate  25 mg (10 pills), Folic acid 1 mg daily, not yet started Adalimumab biosimilar  Drops: Prednisolone (pink cap) back to once daily in both eyes, Latanoprost (teal cap) at bedtime in both eyes, Dorzolamide (orange cap) daily in both eyes, Combigan (dark blue cap) daily in both eyes. No drops on Saturdays (Methotrexate days)    Imaging from Dr. Lara's visit  OCT macula March 19, 2025  Right eye:Myopic contour, no fluid  Left eye: Myopic contour, no fluid      Assessment:     1. Lens-induced iridocyclitis, right eye (Primary)  Some improvement but still active    2. Acute iritis of left eye  Improving as well    3. Steroid responder, bilateral  IOP controlled today    4. High risk medication use  Oral Methotrexate  25 mg (10 pills), Folic acid 1 mg daily    Plan/Recommendations:    Discussed findings with patient. There is some improvement inflammation but it has not resolved. We discussed that there may be a  component of lens related inflammation right eye and even if this may not be the case left eye, that consider lens repositioning/removal may provide benefit for the right eye. We will consider lens options with DR. Lara.   Eye pressure is 12,9. Dr. Lara may consider something later if appropriate for pressure lowering.   Recommend additional testing when scheduled with Dr. Calvo  Continue these medications unchanged: Oral Methotrexate  25 mg (10 pills), Folic acid 1 mg daily  Regarding adalimumab (Humira biosimilar), okay to consider holding off for now until right eye gets addressed as this may help inflammation in that eye. The left eye has milder flares and may do well just drops for now  Okay to continue drops for now unchanged (Dr. Lara has kindly agreed to try to preservative free drops):  Prednisolone (pink cap) back to once daily in both eyes, Latanoprost (teal cap) at bedtime in both eyes, Dorzolamide (orange cap) daily in both eyes, Combigan (dark blue cap) daily in both eyes. Okay to stay off drops on Saturdays     RTC Dr. Chaparrita samuel [facility] eval    Dr. Lara for surgery timing. We will ask his team to reach to you     PHILL TBD    Physician Attestation     Attending Physician Attestation:  Complete documentation of historical and exam elements from today's encounter can be found in the full encounter summary report (not reduplicated in this progress note). I personally obtained the chief complaint(s) and history of present illness. I confirmed and edited as necessary the review of systems, past medical/surgical history, family history, social history, and examination findings as documented by others; and I examined the patient myself. I personally reviewed the relevant tests, images, and reports as documented above. I formulated and edited as necessary the assessment and plan and discussed the findings and management plan with the patient and family members present at the time of this  visit.  Dheeraj Rich M.D., Uveitis and Medical Retina, March 19, 2025

## 2025-03-24 PROBLEM — H20.21 LENS-INDUCED IRIDOCYCLITIS, RIGHT EYE: Status: ACTIVE | Noted: 2025-03-19

## 2025-03-24 PROBLEM — H40.051 OCULAR HYPERTENSION, RIGHT EYE: Status: ACTIVE | Noted: 2025-03-19

## 2025-03-24 PROBLEM — H40.043 STEROID RESPONDER, BILATERAL: Status: ACTIVE | Noted: 2025-03-19

## 2025-03-24 PROBLEM — H20.021 RECURRENT IRITIS OF RIGHT EYE: Status: ACTIVE | Noted: 2025-03-19

## 2025-03-30 ENCOUNTER — HEALTH MAINTENANCE LETTER (OUTPATIENT)
Age: 55
End: 2025-03-30

## 2025-04-08 ENCOUNTER — OFFICE VISIT (OUTPATIENT)
Dept: OPHTHALMOLOGY | Facility: CLINIC | Age: 55
End: 2025-04-08
Attending: OPHTHALMOLOGY
Payer: COMMERCIAL

## 2025-04-08 DIAGNOSIS — Z79.899 HIGH RISK MEDICATION USE: ICD-10-CM

## 2025-04-08 DIAGNOSIS — H20.00 ACUTE IRITIS OF LEFT EYE: ICD-10-CM

## 2025-04-08 DIAGNOSIS — H20.21 LENS-INDUCED IRIDOCYCLITIS, RIGHT EYE: Primary | ICD-10-CM

## 2025-04-08 DIAGNOSIS — H40.043 STEROID RESPONDER, BILATERAL: ICD-10-CM

## 2025-04-08 PROCEDURE — 99214 OFFICE O/P EST MOD 30 MIN: CPT | Performed by: OPHTHALMOLOGY

## 2025-04-08 RX ORDER — LATANOPROST 50 UG/ML
1 SOLUTION/ DROPS OPHTHALMIC AT BEDTIME
Qty: 2.5 ML | Refills: 3 | Status: SHIPPED | OUTPATIENT
Start: 2025-04-08

## 2025-04-08 RX ORDER — PREDNISOLONE ACETATE 10 MG/ML
1-2 SUSPENSION/ DROPS OPHTHALMIC DAILY
Qty: 10 ML | Refills: 2 | Status: SHIPPED | OUTPATIENT
Start: 2025-04-08

## 2025-04-08 ASSESSMENT — TONOMETRY
OD_IOP_MMHG: 17
OS_IOP_MMHG: 12
IOP_METHOD: APPLANATION

## 2025-04-08 ASSESSMENT — CUP TO DISC RATIO
OS_RATIO: 0.5
OD_RATIO: 0.55

## 2025-04-08 ASSESSMENT — EXTERNAL EXAM - LEFT EYE: OS_EXAM: NORMAL

## 2025-04-08 ASSESSMENT — CONF VISUAL FIELD
METHOD: COUNTING FINGERS
OS_NORMAL: 1
OS_INFERIOR_TEMPORAL_RESTRICTION: 0
OD_NORMAL: 1
OD_SUPERIOR_NASAL_RESTRICTION: 0
OS_SUPERIOR_TEMPORAL_RESTRICTION: 0
OD_INFERIOR_TEMPORAL_RESTRICTION: 0
OS_INFERIOR_NASAL_RESTRICTION: 0
OD_INFERIOR_NASAL_RESTRICTION: 0
OS_SUPERIOR_NASAL_RESTRICTION: 0
OD_SUPERIOR_TEMPORAL_RESTRICTION: 0

## 2025-04-08 ASSESSMENT — VISUAL ACUITY
OD_CC+: +2
METHOD: SNELLEN - LINEAR
OS_CC+: +2
CORRECTION_TYPE: GLASSES
OS_CC: 20/25
OD_CC: 20/30

## 2025-04-08 ASSESSMENT — SLIT LAMP EXAM - LIDS
COMMENTS: SUBTLE PTOSIS
COMMENTS: NORMAL

## 2025-04-08 ASSESSMENT — EXTERNAL EXAM - RIGHT EYE: OD_EXAM: NORMAL

## 2025-04-08 NOTE — PROGRESS NOTES
Chief Complaint/Presenting Concern:  Uveitis follow up    Interval History of Present Ocular Illness:  Debi Reed is a 54 year old patient who returns for follow up of her iritis. Last visit we discussed possible surgery right eye with Dr. Lara. They also discussed possible preservative free options but given other things happening, Debi has elected to keep the same regimen.    Debi notes the vision was better on testing today. She does have these little bubbles floating around in the vision likely right eye. There was a sensation of things getting dark but this lasted for around an hour and then went away.    Interval Updates to Medical/Family/Social History:    Humira biosimilar still on hold.  2. Mom recovering from hip fracture repair    Relevant Review of Systems Updates:  As above     Current eye related medications:   Drops: Prednisolone once daily in both eyes, Latanoprost bedtime in both eyes, Dorzolamide daily in both eyes, Combigan daily in both eyes (not on preservative free drops yet) and still taking Saturdays off). Erythromycin ointment   Pills: Oral Methotrexate 25 mg (10 pills), Folic acid 1 mg daily, not on Humira biosimilar    No Imaging today    Assessment:     1. Lens-induced iridocyclitis, right eye (Primary)  Persistent     2. Acute iritis of left eye  Slight increase     3. Steroid responder, bilateral  IOP 17,12    4. High risk medication use  Oral methotrexate 25 mg weekly     Plan/Recommendations:    Discussed findings with patient and her Friend. There is still some iritis present in each eye with minimal change in each eye. We can keep the treatments the same for now including methotrexate and check things back one more time before surgery  Eye pressure is 17,12. We can continue the same drops for now and hold off on preservative free drops.  Recommend additional testing: None at this time  Continue Oral Methotrexate 25 mg (10 pills), Folic acid 1 mg daily,. Okay to hold  off on  Humira biosimilar at this time.   Continue drops unchanged:Prednisolone once daily in both eyes, Latanoprost bedtime in both eyes, Dorzolamide daily in both eyes, Combigan daily in both eyes still taking Saturdays off).   Okay to hold off on preservative free drops for now  Continue Erythromycin ointment   No need for any oral steroid now. We will likely plan to start a medrol dose pack on the day of surgery and then for six days ago. We will also likely increase frequency of drops before surgery Dr. Lara may consider a subtenon's betamethasone injection on day of surgery    RTC Move 5/27 Tech Visit for IOL Calcs to week of May 19 week with JY same day. IOL Calcs on same day as JY visit No other testing for JY    Physician Attestation     Attending Physician Attestation:  Complete documentation of historical and exam elements from today's encounter can be found in the full encounter summary report (not reduplicated in this progress note). I personally obtained the chief complaint(s) and history of present illness. I confirmed and edited as necessary the review of systems, past medical/surgical history, family history, social history, and examination findings as documented by others; and I examined the patient myself. I personally reviewed the relevant tests, images, and reports as documented above. I formulated and edited as necessary the assessment and plan and discussed the findings and management plan with the patient and family members present at the time of this visit.  Dheeraj Rich M.D., Uveitis and Medical Retina, April 8, 2025

## 2025-04-08 NOTE — LETTER
4/8/2025       RE: Debi Reed  [id number]  [facility] [id number]     Dear Colleague,    Thank you for referring your patient, Debi Reed, to the [facility] [place] at [facility]. Please see a copy of my visit note below.    Chief Complaint/Presenting Concern:  Uveitis follow up    Interval History of Present Ocular Illness:  Debi Reed is a 54 year old patient who returns for follow up of her iritis. Last visit we discussed possible surgery right eye with Dr. Lara. They also discussed possible preservative free options but given other things happening, Debi has elected to keep the same regimen.    Debi notes the vision was better on testing today. She does have these little bubbles floating around in the vision likely right eye. There was a sensation of things getting dark but this lasted for around an hour and then went away.    Interval Updates to Medical/Family/Social History:    Humira biosimilar still on hold.  2. Mom recovering from hip fracture repair    Relevant Review of Systems Updates:  As above     Current eye related medications:   Drops: Prednisolone once daily in both eyes, Latanoprost bedtime in both eyes, Dorzolamide daily in both eyes, Combigan daily in both eyes (not on preservative free drops yet) and still taking Saturdays off). Erythromycin ointment   Pills: Oral Methotrexate 25 mg (10 pills), Folic acid 1 mg daily, not on Humira biosimilar    No Imaging today        Assessment:     1. Lens-induced iridocyclitis, right eye (Primary)  Persistent     2. Acute iritis of left eye  Slight increase     3. Steroid responder, bilateral  IOP 17,12    4. High risk medication use  Oral methotrexate 25 mg weekly     Plan/Recommendations:    Discussed findings with patient and her friend. There is still some iritis present in each eye with minimal change in each eye. We can keep the treatments the same  for now including methotrexate and check things back one more time before surgery  Eye pressure is 17,12. We can continue the same drops for now and hold off on preservative free drops.  Recommend additional testing: None at this time  Continue Oral Methotrexate 25 mg (10 pills), Folic acid 1 mg daily,. Okay to hold off on  Humira biosimilar at this time.   Continue drops unchanged:Prednisolone once daily in both eyes, Latanoprost bedtime in both eyes, Dorzolamide daily in both eyes, Combigan daily in both eyes still taking Saturdays off).   Okay to hold off on preservative free drops for now  Continue Erythromycin ointment   No need for any oral steroid now. We will likely plan to start a medrol dose pack on the day of surgery and then for six days ago. We will also likely increase frequency of drops before surgery Dr. Lara may consider a subtenon's betamethasone injection on day of surgery    RTC Move 5/27 Tech Visit for IOL Calcs to week of May 19 week with JY same day. IOL Calcs on same day as JY visit No other testing for JY    Physician Attestation    Attending Physician Attestation:  Complete documentation of historical and exam elements from today's encounter can be found in the full encounter summary report (not reduplicated in this progress note). I personally obtained the chief complaint(s) and history of present illness. I confirmed and edited as necessary the review of systems, past medical/surgical history, family history, social history, and examination findings as documented by others; and I examined the patient myself. I personally reviewed the relevant tests, images, and reports as documented above. I formulated and edited as necessary the assessment and plan and discussed the findings and management plan with the patient and family members present at the time of this visit.  Dheeraj Rich M.D., Uveitis and Medical Retina, April 8, 2025     Again, thank you for allowing me to participate in the  care of your patient.      Sincerely,    Dheeraj Rich MD  Uveitis and Medical Retina    Department of Ophthalmology & Visual Neurosciences  [facility]

## 2025-04-08 NOTE — PATIENT INSTRUCTIONS
Continue Oral Methotrexate 25 mg (10 pills), Folic acid 1 mg daily,. Okay to hold off on  Humira biosimilar at this time.   Continue drops unchanged:Prednisolone once daily in both eyes, Latanoprost bedtime in both eyes, Dorzolamide daily in both eyes, Combigan daily in both eyes still taking Saturdays off).   Okay to hold off on preservative free drops for now  Continue Erythromycin ointment   No need for any oral steroid now. We will likely plan to start a medrol dose pack on the day of surgery and then for six days ago. We will also likely increase frequency of drops before surgery Dr. Lara may consider a subtenon's betamethasone injection on day of surgery

## 2025-04-17 ENCOUNTER — LAB (OUTPATIENT)
Dept: LAB | Facility: CLINIC | Age: 55
End: 2025-04-17
Payer: COMMERCIAL

## 2025-04-17 DIAGNOSIS — Z79.631 LONG TERM METHOTREXATE USER: ICD-10-CM

## 2025-04-17 LAB
ALT SERPL W P-5'-P-CCNC: 29 U/L (ref 0–50)
AST SERPL W P-5'-P-CCNC: 24 U/L (ref 0–45)

## 2025-06-06 ENCOUNTER — RESULTS FOLLOW-UP (OUTPATIENT)
Dept: RHEUMATOLOGY | Facility: CLINIC | Age: 55
End: 2025-06-06

## 2025-06-08 ENCOUNTER — ANESTHESIA EVENT (OUTPATIENT)
Dept: SURGERY | Facility: AMBULATORY SURGERY CENTER | Age: 55
End: 2025-06-08
Payer: COMMERCIAL

## 2025-06-09 ENCOUNTER — HOSPITAL ENCOUNTER (OUTPATIENT)
Facility: AMBULATORY SURGERY CENTER | Age: 55
Discharge: HOME | End: 2025-06-09
Attending: OPHTHALMOLOGY
Payer: COMMERCIAL

## 2025-06-09 ENCOUNTER — ANESTHESIA (OUTPATIENT)
Dept: SURGERY | Facility: AMBULATORY SURGERY CENTER | Age: 55
End: 2025-06-09
Payer: COMMERCIAL

## 2025-06-09 VITALS
SYSTOLIC BLOOD PRESSURE: 133 MMHG | BODY MASS INDEX: 26.6 KG/M2 | DIASTOLIC BLOOD PRESSURE: 72 MMHG | OXYGEN SATURATION: 99 % | HEIGHT: 71 IN | TEMPERATURE: 97.2 F | RESPIRATION RATE: 16 BRPM | HEART RATE: 56 BPM | WEIGHT: 190 LBS

## 2025-06-09 DIAGNOSIS — H40.051 OCULAR HYPERTENSION, RIGHT EYE: ICD-10-CM

## 2025-06-09 DIAGNOSIS — H20.21 LENS-INDUCED IRIDOCYCLITIS, RIGHT EYE: Primary | ICD-10-CM

## 2025-06-09 DIAGNOSIS — H20.021 RECURRENT IRITIS OF RIGHT EYE: ICD-10-CM

## 2025-06-09 DEVICE — IMPLANTABLE DEVICE: Type: IMPLANTABLE DEVICE | Site: EYE | Status: FUNCTIONAL

## 2025-06-09 RX ORDER — DEXAMETHASONE SODIUM PHOSPHATE 10 MG/ML
4 INJECTION, SOLUTION INTRAMUSCULAR; INTRAVENOUS
Status: DISCONTINUED | OUTPATIENT
Start: 2025-06-09 | End: 2025-06-10 | Stop reason: HOSPADM

## 2025-06-09 RX ORDER — MOXIFLOXACIN 5 MG/ML
1 SOLUTION/ DROPS OPHTHALMIC 3 TIMES DAILY
Qty: 3 ML | Refills: 1 | Status: SHIPPED | OUTPATIENT
Start: 2025-06-09

## 2025-06-09 RX ORDER — ATORVASTATIN CALCIUM 20 MG/1
20 TABLET, FILM COATED ORAL DAILY
COMMUNITY

## 2025-06-09 RX ORDER — FENTANYL CITRATE 50 UG/ML
25 INJECTION, SOLUTION INTRAMUSCULAR; INTRAVENOUS EVERY 5 MIN PRN
Status: DISCONTINUED | OUTPATIENT
Start: 2025-06-09 | End: 2025-06-09 | Stop reason: HOSPADM

## 2025-06-09 RX ORDER — LORAZEPAM 0.5 MG/1
0.5 TABLET ORAL ONCE
Status: COMPLETED | OUTPATIENT
Start: 2025-06-09 | End: 2025-06-09

## 2025-06-09 RX ORDER — ONDANSETRON 2 MG/ML
4 INJECTION INTRAMUSCULAR; INTRAVENOUS EVERY 30 MIN PRN
Status: DISCONTINUED | OUTPATIENT
Start: 2025-06-09 | End: 2025-06-09 | Stop reason: HOSPADM

## 2025-06-09 RX ORDER — FENTANYL CITRATE 50 UG/ML
50 INJECTION, SOLUTION INTRAMUSCULAR; INTRAVENOUS EVERY 5 MIN PRN
Status: DISCONTINUED | OUTPATIENT
Start: 2025-06-09 | End: 2025-06-09 | Stop reason: HOSPADM

## 2025-06-09 RX ORDER — MOXIFLOXACIN IN NACL,ISO-OS/PF 0.3MG/0.3
SYRINGE (ML) INTRAOCULAR PRN
Status: DISCONTINUED | OUTPATIENT
Start: 2025-06-09 | End: 2025-06-09 | Stop reason: HOSPADM

## 2025-06-09 RX ORDER — ONDANSETRON 2 MG/ML
4 INJECTION INTRAMUSCULAR; INTRAVENOUS EVERY 30 MIN PRN
Status: DISCONTINUED | OUTPATIENT
Start: 2025-06-09 | End: 2025-06-10 | Stop reason: HOSPADM

## 2025-06-09 RX ORDER — OXYCODONE HYDROCHLORIDE 5 MG/1
5 TABLET ORAL
Status: DISCONTINUED | OUTPATIENT
Start: 2025-06-09 | End: 2025-06-10 | Stop reason: HOSPADM

## 2025-06-09 RX ORDER — PREDNISOLONE ACETATE 10 MG/ML
1 SUSPENSION/ DROPS OPHTHALMIC 4 TIMES DAILY
Qty: 10 ML | Refills: 1 | Status: SHIPPED | OUTPATIENT
Start: 2025-06-09

## 2025-06-09 RX ORDER — POVIDONE-IODINE 5 %
1 SOLUTION, NON-ORAL OPHTHALMIC (EYE) ONCE
Status: DISCONTINUED | OUTPATIENT
Start: 2025-06-09 | End: 2025-06-09 | Stop reason: HOSPADM

## 2025-06-09 RX ORDER — TETRACAINE HYDROCHLORIDE 5 MG/ML
SOLUTION OPHTHALMIC PRN
Status: DISCONTINUED | OUTPATIENT
Start: 2025-06-09 | End: 2025-06-09 | Stop reason: HOSPADM

## 2025-06-09 RX ORDER — SODIUM CHLORIDE, SODIUM LACTATE, POTASSIUM CHLORIDE, CALCIUM CHLORIDE 600; 310; 30; 20 MG/100ML; MG/100ML; MG/100ML; MG/100ML
INJECTION, SOLUTION INTRAVENOUS CONTINUOUS
Status: DISCONTINUED | OUTPATIENT
Start: 2025-06-09 | End: 2025-06-10 | Stop reason: HOSPADM

## 2025-06-09 RX ORDER — ATROPINE SULFATE 10 MG/ML
SOLUTION/ DROPS OPHTHALMIC PRN
Status: DISCONTINUED | OUTPATIENT
Start: 2025-06-09 | End: 2025-06-09 | Stop reason: HOSPADM

## 2025-06-09 RX ORDER — PROPARACAINE HYDROCHLORIDE 5 MG/ML
1 SOLUTION/ DROPS OPHTHALMIC ONCE
Status: COMPLETED | OUTPATIENT
Start: 2025-06-09 | End: 2025-06-09

## 2025-06-09 RX ORDER — HYDROMORPHONE HYDROCHLORIDE 1 MG/ML
0.4 INJECTION, SOLUTION INTRAMUSCULAR; INTRAVENOUS; SUBCUTANEOUS EVERY 5 MIN PRN
Status: DISCONTINUED | OUTPATIENT
Start: 2025-06-09 | End: 2025-06-09 | Stop reason: HOSPADM

## 2025-06-09 RX ORDER — CYCLOPENTOLAT/TROPIC/PHENYLEPH 1%-1%-2.5%
1 DROPS (EA) OPHTHALMIC (EYE)
Status: COMPLETED | OUTPATIENT
Start: 2025-06-09 | End: 2025-06-09

## 2025-06-09 RX ORDER — DEXAMETHASONE SODIUM PHOSPHATE 4 MG/ML
INJECTION, SOLUTION INTRA-ARTICULAR; INTRALESIONAL; INTRAMUSCULAR; INTRAVENOUS; SOFT TISSUE PRN
Status: DISCONTINUED | OUTPATIENT
Start: 2025-06-09 | End: 2025-06-09 | Stop reason: HOSPADM

## 2025-06-09 RX ORDER — NALOXONE HYDROCHLORIDE 0.4 MG/ML
0.1 INJECTION, SOLUTION INTRAMUSCULAR; INTRAVENOUS; SUBCUTANEOUS
Status: DISCONTINUED | OUTPATIENT
Start: 2025-06-09 | End: 2025-06-09 | Stop reason: HOSPADM

## 2025-06-09 RX ORDER — KETOROLAC TROMETHAMINE 30 MG/ML
15 INJECTION, SOLUTION INTRAMUSCULAR; INTRAVENOUS ONCE
Status: COMPLETED | OUTPATIENT
Start: 2025-06-09 | End: 2025-06-09

## 2025-06-09 RX ORDER — FENTANYL CITRATE 50 UG/ML
INJECTION, SOLUTION INTRAMUSCULAR; INTRAVENOUS PRN
Status: DISCONTINUED | OUTPATIENT
Start: 2025-06-09 | End: 2025-06-09

## 2025-06-09 RX ORDER — ONDANSETRON 4 MG/1
4 TABLET, ORALLY DISINTEGRATING ORAL EVERY 30 MIN PRN
Status: DISCONTINUED | OUTPATIENT
Start: 2025-06-09 | End: 2025-06-09 | Stop reason: HOSPADM

## 2025-06-09 RX ORDER — LIDOCAINE HYDROCHLORIDE 20 MG/ML
INJECTION, SOLUTION INFILTRATION; PERINEURAL PRN
Status: DISCONTINUED | OUTPATIENT
Start: 2025-06-09 | End: 2025-06-09

## 2025-06-09 RX ORDER — PROPOFOL 10 MG/ML
INJECTION, EMULSION INTRAVENOUS PRN
Status: DISCONTINUED | OUTPATIENT
Start: 2025-06-09 | End: 2025-06-09

## 2025-06-09 RX ORDER — LIDOCAINE 40 MG/G
CREAM TOPICAL
Status: DISCONTINUED | OUTPATIENT
Start: 2025-06-09 | End: 2025-06-09 | Stop reason: HOSPADM

## 2025-06-09 RX ORDER — OXYCODONE HYDROCHLORIDE 5 MG/1
10 TABLET ORAL
Status: DISCONTINUED | OUTPATIENT
Start: 2025-06-09 | End: 2025-06-10 | Stop reason: HOSPADM

## 2025-06-09 RX ORDER — NALOXONE HYDROCHLORIDE 0.4 MG/ML
0.1 INJECTION, SOLUTION INTRAMUSCULAR; INTRAVENOUS; SUBCUTANEOUS
Status: DISCONTINUED | OUTPATIENT
Start: 2025-06-09 | End: 2025-06-10 | Stop reason: HOSPADM

## 2025-06-09 RX ORDER — ONDANSETRON 2 MG/ML
INJECTION INTRAMUSCULAR; INTRAVENOUS PRN
Status: DISCONTINUED | OUTPATIENT
Start: 2025-06-09 | End: 2025-06-09

## 2025-06-09 RX ORDER — SODIUM CHLORIDE, SODIUM LACTATE, POTASSIUM CHLORIDE, CALCIUM CHLORIDE 600; 310; 30; 20 MG/100ML; MG/100ML; MG/100ML; MG/100ML
INJECTION, SOLUTION INTRAVENOUS CONTINUOUS
Status: DISCONTINUED | OUTPATIENT
Start: 2025-06-09 | End: 2025-06-09 | Stop reason: HOSPADM

## 2025-06-09 RX ORDER — HYDROMORPHONE HYDROCHLORIDE 1 MG/ML
0.2 INJECTION, SOLUTION INTRAMUSCULAR; INTRAVENOUS; SUBCUTANEOUS EVERY 5 MIN PRN
Status: DISCONTINUED | OUTPATIENT
Start: 2025-06-09 | End: 2025-06-09 | Stop reason: HOSPADM

## 2025-06-09 RX ORDER — ACETAMINOPHEN 325 MG/1
975 TABLET ORAL ONCE
Status: COMPLETED | OUTPATIENT
Start: 2025-06-09 | End: 2025-06-09

## 2025-06-09 RX ORDER — BALANCED SALT SOLUTION 6.4; .75; .48; .3; 3.9; 1.7 MG/ML; MG/ML; MG/ML; MG/ML; MG/ML; MG/ML
SOLUTION OPHTHALMIC PRN
Status: DISCONTINUED | OUTPATIENT
Start: 2025-06-09 | End: 2025-06-09 | Stop reason: HOSPADM

## 2025-06-09 RX ORDER — ONDANSETRON 4 MG/1
4 TABLET, ORALLY DISINTEGRATING ORAL EVERY 30 MIN PRN
Status: DISCONTINUED | OUTPATIENT
Start: 2025-06-09 | End: 2025-06-10 | Stop reason: HOSPADM

## 2025-06-09 RX ORDER — DEXAMETHASONE SODIUM PHOSPHATE 10 MG/ML
4 INJECTION, SOLUTION INTRAMUSCULAR; INTRAVENOUS
Status: DISCONTINUED | OUTPATIENT
Start: 2025-06-09 | End: 2025-06-09 | Stop reason: HOSPADM

## 2025-06-09 RX ORDER — TIMOLOL 5 MG/ML
SOLUTION/ DROPS OPHTHALMIC PRN
Status: DISCONTINUED | OUTPATIENT
Start: 2025-06-09 | End: 2025-06-09 | Stop reason: HOSPADM

## 2025-06-09 RX ORDER — KETOROLAC TROMETHAMINE 4 MG/ML
1 SOLUTION/ DROPS OPHTHALMIC 4 TIMES DAILY
Qty: 5 ML | Refills: 1 | Status: SHIPPED | OUTPATIENT
Start: 2025-06-09

## 2025-06-09 RX ADMIN — PROPOFOL 10 MG: 10 INJECTION, EMULSION INTRAVENOUS at 12:59

## 2025-06-09 RX ADMIN — SODIUM CHLORIDE, SODIUM LACTATE, POTASSIUM CHLORIDE, CALCIUM CHLORIDE: 600; 310; 30; 20 INJECTION, SOLUTION INTRAVENOUS at 11:25

## 2025-06-09 RX ADMIN — PROPOFOL 10 MG: 10 INJECTION, EMULSION INTRAVENOUS at 12:48

## 2025-06-09 RX ADMIN — Medication 1 DROP: at 10:50

## 2025-06-09 RX ADMIN — FENTANYL CITRATE 25 MCG: 50 INJECTION, SOLUTION INTRAMUSCULAR; INTRAVENOUS at 12:39

## 2025-06-09 RX ADMIN — PROPARACAINE HYDROCHLORIDE 1 DROP: 5 SOLUTION/ DROPS OPHTHALMIC at 10:42

## 2025-06-09 RX ADMIN — FENTANYL CITRATE 50 MCG: 50 INJECTION, SOLUTION INTRAMUSCULAR; INTRAVENOUS at 11:43

## 2025-06-09 RX ADMIN — FENTANYL CITRATE 25 MCG: 50 INJECTION, SOLUTION INTRAMUSCULAR; INTRAVENOUS at 13:49

## 2025-06-09 RX ADMIN — FENTANYL CITRATE 25 MCG: 50 INJECTION, SOLUTION INTRAMUSCULAR; INTRAVENOUS at 12:12

## 2025-06-09 RX ADMIN — PROPOFOL 10 MG: 10 INJECTION, EMULSION INTRAVENOUS at 12:27

## 2025-06-09 RX ADMIN — FENTANYL CITRATE 25 MCG: 50 INJECTION, SOLUTION INTRAMUSCULAR; INTRAVENOUS at 12:56

## 2025-06-09 RX ADMIN — ONDANSETRON 4 MG: 2 INJECTION INTRAMUSCULAR; INTRAVENOUS at 11:25

## 2025-06-09 RX ADMIN — LORAZEPAM 0.5 MG: 0.5 TABLET ORAL at 15:02

## 2025-06-09 RX ADMIN — Medication 1 DROP: at 10:45

## 2025-06-09 RX ADMIN — PROPOFOL 10 MG: 10 INJECTION, EMULSION INTRAVENOUS at 12:20

## 2025-06-09 RX ADMIN — PROPOFOL 70 MG: 10 INJECTION, EMULSION INTRAVENOUS at 11:31

## 2025-06-09 RX ADMIN — PROPOFOL 10 MG: 10 INJECTION, EMULSION INTRAVENOUS at 11:56

## 2025-06-09 RX ADMIN — LIDOCAINE HYDROCHLORIDE 30 MG: 20 INJECTION, SOLUTION INFILTRATION; PERINEURAL at 11:29

## 2025-06-09 RX ADMIN — KETOROLAC TROMETHAMINE 15 MG: 30 INJECTION, SOLUTION INTRAMUSCULAR; INTRAVENOUS at 14:19

## 2025-06-09 RX ADMIN — Medication 1 DROP: at 10:55

## 2025-06-09 RX ADMIN — PROPOFOL 50 MG: 10 INJECTION, EMULSION INTRAVENOUS at 11:29

## 2025-06-09 RX ADMIN — PROPOFOL 10 MG: 10 INJECTION, EMULSION INTRAVENOUS at 13:19

## 2025-06-09 RX ADMIN — FENTANYL CITRATE 25 MCG: 50 INJECTION, SOLUTION INTRAMUSCULAR; INTRAVENOUS at 11:58

## 2025-06-09 RX ADMIN — ACETAMINOPHEN 975 MG: 325 TABLET ORAL at 13:49

## 2025-06-09 NOTE — OP NOTE
PREOPERATIVE DIAGNOSIS:   1. Lens-induced iridocyclitis, right eye    2. Recurrent iritis of right eye    3. Ocular hypertension, right eye     POSTOPERATIVE DIAGNOSIS: Same   PROCEDURES:   1. Explantation of dislocated intraocular lens implant right eye   2. Removal of cortical remnants, right eye   3. Synechialysis, right eye   4. Pars plana anterior vitrectomy, right eye   5. Insertion of secondary scleral fixated intraocular lens, right eye   SURGEON: Stephen Lara M.D.  Assistant: Albert Tomas M.D.       INDICATIONS: The patient Debi Reed presented to the eye clinic with decreased vision secondary to recurrent inflammation and glaucoma in the setting of uveitis-glaucoma-hyphema syndrome. The risks, benefits and alternatives to surgery were discussed. The patient elected to proceed. All questions were answered to the patient's satisfaction.   DESCRIPTION OF PROCEDURE:Prior to the procedure, appropriate cardiac and respiratory monitors were applied to the patient.  In the pre-operative holding area, under monitored anesthesia care, a retrobulbar injection of 2% lidocaine with hyaluronidase was given.  The patient was brought to the operating room where a surgical pause was carried out to identify with all members of the surgical team the correct surgical site.  With adequate anesthesia and akinesia, the right eye was prepped and draped in the usual sterile fashion. A lid speculum was placed, and the operating microscope was rotated into position. Two superior paracenteses were created.  Through this limbal paracentesis, the anterior chamber was inflated with dispersive viscoelastic. Kuglen hooks were used to assess the inferior lens displacement identifying a contracted capsule with the haptic anterior the optic and in contact with the iris.  Broad capsule-iris synechiae were identified from 5-7 o'clock.  Blunt dissection was attempted and unsuccessful to lyse the synechiae.  A microscissors was used to  cut the synechiae and free the inferior capsule.  After which the inferior lens was noted to fall posteriorly.  An inferior paracentesis was made and a cyclodialysis spatula placed behind the intraocular lens-capsule complex.  A micrograsper was used to dial the displaced complex into the anterior chamber.  During which, additional superior synechiae were identified from 10-12 o'clock.  The capsule was able to be sheared from the lens and remained attached at the iris.      An inferior anterior chamber maintainer was placed and irrigation started.  A 23-gauge trochar was placed superior temporally.  Markings were made at 3 and 9 o'clock using a toric dial for future reference.  A thorough pars plana anterior vitrectomy was completed.  A superior wound was created at the limbus using a 2.5 mm blade. The intraocular lens was bisected using microscissors and removed via the superior wound. Residual cortical material and the remnant capsule were removed using mechanical extraction via the superior wound and with the aid of the vitrector.  The superior synechiae was sharply lysed with microscissors.  A small remnant of the capsular bag was seen to fall posteriorly.  Preservative rinsed triamcinolone was used to confirm a complete core vitrectomy was achieved.      Two fornix based peritomies were fashioned surrounding the limbal markings.  Light cautery was used for hemostasis.  A posterior marcia was made 2 mm from the limbal markings.  A 3 mm scleral groove was then made to 50% depth centered on the posterior marking.   A 6.0 diopter EE lens was prepared by cutting the haptics distal to the haptic-optic islet in a beveled fashion.  The lens power selected was reviewed using the intraocular lens power measurements that were obtained preoperatively to confirm that the correct lens was selected for the desired post-operative refractive state. Gortex suture was prepared by removing the needles.  A loop of suture was pulled  through the islet and hitches made to each side from posterior to anterior.  The leading suture was fed through a monarch cartridge and brought into the anterior chamber.  The suture ends were removed through the superior nasal paracentesis to secure the lens during insertion.  The lens was then folded and inserted via the cartridge.  The 23-gauge trochar was used to make four sclerotomies at the terminal ends of the nasal and temporal scleral grooves.  A micro forceps was brought into the pars plana to retreive the inferior temporal suture tail.  This was repeated superior temporally and confirmed to be the correct orientation for the sutures.  The nasal suture was retrieved from the paracentesis and tails left in the anterior chamber.  Again a microforceps was used to externalize the suture tails and orientation confirmed.  A triple throw was made to secure each suture and tension adjusted to center the lens.  No significant tilt was noted and the lens centered well.  The sutures were then secured with 3-1-1 square knots.  The knots were buried within the scleral groove.  The trochar was removed.  A leak was noted inferior temporally from the sclerotomy and closed using 7-0 vicryl suture.  The conjunctiva and Tenon membrane was closed at the limbus with interrupted 8-0 vicryl.  The residual viscoelastic was removed in its entirety, the remaining wounds were hydrated and found to be self-sealing.  Intracameral moxifloxacin was administered. Tactile pressure was confirmed to be in a normal range. Subconjunctival Dexamethasone (2 mg) was injected. The lid speculum was removed and a patch and shield were applied.  The patient tolerated the procedure well.  PLAN: The patient will be discharged to home and will follow up tomorrow morning in the eye clinic.  EBL:  Minimal  Complications:  Posteriorly dropped lens remnants  Implant Name Type Inv. Item Serial No.  Lot No. LRB No. Used Action   [employer]  ENVISTA INTRAOCULAR LENS IMPLANT EE 6.0 Lens/Eye Implant  [id number] [employer]  Right 1 Implanted   INTRAOCULAR LENS IMPLANT      Right 1 Explanted       Attending Physician Procedure Attestation: I was present for the entire procedure       Stephen Lara MD  , Comprehensive Ophthalmology  Department of Ophthalmology and Visual Neurosciences  [facility]

## 2025-06-09 NOTE — DISCHARGE INSTRUCTIONS
[facility]  Home Care Following Anesthesia  For 24 hours after surgery:  Get plenty of rest.  A responsible adult must stay with you for at least 24 hours after you leave the surgery center.  Do not drive or use heavy equipment.  If you have weakness or tingling, don't drive or use heavy equipment until this feeling goes away.   Do not drink alcohol.   Avoid strenuous or risky activities.  Ask for help when climbing stairs.  You may feel lightheaded.  IF so, sit for a few minutes before standing.  Have someone help you get up.   If you have nausea (feel sick to your stomach): Drink only clear liquids such as apple juice, ginger ale, broth or 7-Up.  Rest may also help.  Be sure to drink enough fluids.  Move to a regular diet as you feel able.   You may have a slight fever.  Call the doctor if your fever is over 100 F (37.7 C) (taken under the tongue) or lasts longer than 24 hours.  You may have a dry mouth, a sore throat, muscle aches or trouble sleeping. These should go away after 24 hours.  Do not make important or legal decisions.   It is recommended to avoid smoking.               Tips for taking pain medications  To get the best pain relief possible, remember these points:  Take pain medications as directed, before pain becomes severe.  Pain medication can upset your stomach: taking it with food may help.  Constipation is a common side effect of pain medication. Drink plenty of  fluids.  Eat foods high in fiber. Take a stool softener if recommended by your doctor or pharmacist.  Do not drink alcohol, drive or operate machinery while taking pain medications.  Ask about other ways to control pain, such as with heat, ice or relaxation.    Tylenol/Acetaminophen Consumption    If you feel your pain relief is insufficient, you may take Tylenol/Acetaminophen in addition to your narcotic pain medication.   Be careful not to exceed 4,000 mg of Tylenol/Acetaminophen in a 24 hour  period from all sources.  If you are taking extra strength Tylenol/acetaminophen (500 mg), the maximum dose is 8 tablets in 24 hours.  If you are taking regular strength acetaminophen (325 mg), the maximum dose is 12 tablets in 24 hours.    Call a doctor for any of the following:  Signs of infection (fever, growing tenderness at the surgery site, a large amount of drainage or bleeding, severe pain, foul-smelling drainage, redness, swelling).  It has been over 8 to 10 hours since surgery and you are still not able to urinate (pass water).  Headache for over 24 hours.  Numbness, tingling or weakness the day after surgery (if you had spinal anesthesia).  Signs of Covid-19 infection (temperature over 100 degrees, shortness of breath, cough, loss of taste/smell, generalized body aches, persistent headache, chills, sore throat, nausea/vomiting/diarrhea)    Your doctor is:       Dr. Stephen Lara, Ophthalmology: [phone]               After hours and weekends call the hospital @ [phone] and ask for the resident on call for:  Ophthalmology  For emergency care, call the:  [place]:  [phone] (TTY for hearing impaired: [phone])

## 2025-06-09 NOTE — ANESTHESIA PREPROCEDURE EVALUATION
Anesthesia Pre-Procedure Evaluation    Patient: Debi Reed   MRN: [id number] : 1970          Procedure : Procedure(s):  RIGHT EYE REPOSITIONING, INTRAOCULAR LENS IMPLANT  REMOVAL, CORTICAL REMNANTS  POSSIBLE VITRECTOMY, ANTERIOR  POSSIBLE REPLACEMENT, INTRAOCULAR LENS IMPLANT         Past Medical History:   Diagnosis Date    Hypothyroidism     Recurrent acute iridocyclitis of right eye     Sinusitis, chronic       Past Surgical History:   Procedure Laterality Date    CATARACT IOL, RT/LT Bilateral 2010    LASER YAG CAPSULOTOMY Left 2016    Procedure: LASER YAG CAPSULOTOMY;  Surgeon: Obie Lombardi MD;  Location:  [facility]    RETINAL REATTACHMENT Right     shortly after cataract surgeries    VITRECTOMY PARSPLANA, SCLERAL BUCKLE, COMBINED Left     YAG CAPSULOTOMY OS (LEFT EYE)        Allergies   Allergen Reactions    Morphine Nausea and Other (See Comments)     Auditory hallucinations.    Adhesive Tape Itching and Rash    [place] Rash    Latex Rash    Nickel Rash      Social History     Tobacco Use    Smoking status: Never    Smokeless tobacco: Never   Substance Use Topics    Alcohol use: Not Currently      Wt Readings from Last 1 Encounters:   25 86.2 kg (190 lb)        Anesthesia Evaluation            ROS/MED HX  ENT/Pulmonary:       Neurologic:       Cardiovascular:     (+) Dyslipidemia - -   -  - -                                      METS/Exercise Tolerance:     Hematologic:       Musculoskeletal:       GI/Hepatic:       Renal/Genitourinary:       Endo:     (+)          thyroid problem, hypothyroidism,           Psychiatric/Substance Use:     (+) psychiatric history anxiety and depression       Infectious Disease:       Malignancy:       Other:              Physical Exam  Airway  Mallampati: II  TM distance: >3 FB  Neck ROM: full  Mouth opening: >= 4 cm    Cardiovascular   Rhythm: regular  Rate: normal rate     Dental   (+) Completely normal teeth        Pulmonary Breath sounds  "clear to auscultation        Neurological   She appears awake, alert and oriented x3.    Other Findings Post for a bridge      OUTSIDE LABS:  CBC:   Lab Results   Component Value Date    WBC 4.7 06/06/2025    WBC 5.4 03/06/2025    HGB 12.8 06/06/2025    HGB 12.3 03/06/2025    HCT 38.0 06/06/2025    HCT 36.1 03/06/2025     06/06/2025     03/06/2025     BMP:   Lab Results   Component Value Date    CR 0.71 06/06/2025    CR 0.63 03/06/2025     COAGS: No results found for: \"PTT\", \"INR\", \"FIBR\"  POC: No results found for: \"BGM\", \"HCG\", \"HCGS\"  HEPATIC:   Lab Results   Component Value Date    ALBUMIN 4.4 06/06/2025    ALT 19 06/06/2025    AST 21 06/06/2025     OTHER: No results found for: \"PH\", \"LACT\", \"A1C\", \"KATERINE\", \"PHOS\", \"MAG\", \"LIPASE\", \"AMYLASE\", \"TSH\", \"T4\", \"T3\", \"CRP\", \"SED\"    Anesthesia Plan    ASA Status:  2      NPO Status: NPO Appropriate   Anesthesia Type: MAC.  Airway: natural airway.  Induction: intravenous.  Maintenance: TIVA.   Techniques and Equipment:       - Monitoring Plan: standard ASA monitoring     Consents    Anesthesia Plan(s) and associated risks, benefits, and realistic alternatives discussed. Questions answered and patient/representative(s) expressed understanding.     - Discussed:     - Discussed with:  Patient        - Pt is DNR/DNI Status: no DNR     Blood Consent:      - Discussed with: not discussed.     Postoperative Care    Pain management: non-narcotic analgesics, plan for postoperative opioid use.     Comments:                   Nico Cheek MD    I have reviewed the pertinent notes and labs in the chart from the past 30 days and (re)examined the patient.  Any updates or changes from those notes are reflected in this note.    Clinically Significant Risk Factors Present on Admission                             # Overweight: Estimated body mass index is 26.5 kg/m  as calculated from the following:    Height as of this encounter: 1.803 m (5' 11\").    Weight as of " this encounter: 86.2 kg (190 lb).

## 2025-06-09 NOTE — ANESTHESIA POSTPROCEDURE EVALUATION
Patient: Debi Reed    Procedure: Procedure(s):  RIGHT EYE REMOVAL OF DISLOCATED INTRAOCULAR LENS IMPLANT, INSERTION OF SCLERAL FIXATED INTRAOCULAR LENS IMPLANT  REMOVAL, CORTICAL REMNANTS  VITRECTOMY, ANTERIOR       Anesthesia Type:  MAC    Note:  Disposition: Outpatient   Postop Pain Control: Uneventful            Sign Out: Well controlled pain   PONV: No   Neuro/Psych: Uneventful            Sign Out: Acceptable/Baseline neuro status   Airway/Respiratory: Uneventful            Sign Out: Acceptable/Baseline resp. status   CV/Hemodynamics: Uneventful            Sign Out: Acceptable CV status   Other NRE: NONE   DID A NON-ROUTINE EVENT OCCUR? No           Last vitals:  Vitals Value Taken Time   /69 06/09/25 13:45   Temp 36  C (96.8  F) 06/09/25 13:38   Pulse 59 06/09/25 13:45   Resp 15 06/09/25 13:45   SpO2 95 % 06/09/25 13:55   Vitals shown include unfiled device data.    Electronically Signed By: Nico Cheek MD  June 9, 2025  1:56 PM

## 2025-06-09 NOTE — PROGRESS NOTES
"Dr. Cheek paged to inform pt having 8/10 headache. He ordered 975 mg PO tylenol. Dr. Delgado paged at 14:15 and informed pt continues to have a headache, he recommended giving IV Ketoralac. Pt continues to have reports of headache across her forehead. Pt also states she is irritated by her eyelashes being \"in different direction underneath the bandage.\" Writer paged Dr. Tomas in ophthalmology. Informed him of pts headache pain and irritation. He states that eyelash irritation could be related to sutures irritating the eyes and it may feel like it is the eyelashes. He states there is not a drop for this type of pain. Writer updated pt. She states she takes lorazepam at home for anxiety and requests this for anxiety. Cristiano Guajardo RN informed anesthesia MD and she gave order to give at ativan around 1500. Writer reassessed pain close to discharge and pt states she thinks at this point sleep is the best option. She is agreeable to plan for discharge as she thinks she will sleep through the headache at home and she states the \"ativan will probably help\". Pt discharged via wheelchair to home with her responsible adult.  "

## 2025-06-09 NOTE — ANESTHESIA CARE TRANSFER NOTE
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    Anesthesia Type:   MAC     Note:    Oropharynx: oropharynx clear of all foreign objects and spontaneously breathing  Level of Consciousness: awake  Oxygen Supplementation: room air    Independent Airway: airway patency satisfactory and stable  Dentition: dentition unchanged  Vital Signs Stable: post-procedure vital signs reviewed and stable  Report to RN Given: handoff report given  Patient transferred to: Phase II  Comments: Report to Phase II RN  Handoff Report: Identifed the Patient, Identified the Reponsible Provider, Reviewed the pertinent medical history, Discussed the surgical course, Reviewed Intra-OP anesthesia mangement and issues during anesthesia, Set expectations for post-procedure period and Allowed opportunity for questions and acknowledgement of understanding      Vitals:  Vitals Value Taken Time   BP     Temp     Pulse     Resp     SpO2 96 % 06/09/25 13:37   Vitals shown include unfiled device data.    Electronically Signed By: QING Kingston CRNA  June 9, 2025  1:38 PM

## 2025-06-10 ENCOUNTER — OFFICE VISIT (OUTPATIENT)
Dept: OPHTHALMOLOGY | Facility: CLINIC | Age: 55
End: 2025-06-10
Attending: OPHTHALMOLOGY
Payer: COMMERCIAL

## 2025-06-10 DIAGNOSIS — Z98.890 POST-OPERATIVE STATE: Primary | ICD-10-CM

## 2025-06-10 DIAGNOSIS — Z98.890 HISTORY OF LASER PHOTOCOAGULATION OF RETINA: ICD-10-CM

## 2025-06-10 DIAGNOSIS — H20.21 LENS-INDUCED IRIDOCYCLITIS, RIGHT EYE: ICD-10-CM

## 2025-06-10 DIAGNOSIS — H40.043 STEROID RESPONDER, BILATERAL: ICD-10-CM

## 2025-06-10 DIAGNOSIS — Z96.1 PSEUDOPHAKIA, BOTH EYES: ICD-10-CM

## 2025-06-10 PROCEDURE — 99214 OFFICE O/P EST MOD 30 MIN: CPT | Performed by: OPHTHALMOLOGY

## 2025-06-10 ASSESSMENT — REFRACTION_WEARINGRX
OD_SPHERE: -2.25
OS_AXIS: 120
OD_AXIS: 010
OD_CYLINDER: +0.50
OS_ADD: +2.75
OS_CYLINDER: +1.00
SPECS_TYPE: PAL
OD_ADD: +2.75
OS_SPHERE: -1.00

## 2025-06-10 ASSESSMENT — EXTERNAL EXAM - RIGHT EYE: OD_EXAM: NORMAL

## 2025-06-10 ASSESSMENT — EXTERNAL EXAM - LEFT EYE: OS_EXAM: NORMAL

## 2025-06-10 ASSESSMENT — TONOMETRY
IOP_METHOD: APPLANATION
OD_IOP_MMHG: 33
OS_IOP_MMHG: 18

## 2025-06-10 ASSESSMENT — CUP TO DISC RATIO: OD_RATIO: 0.55

## 2025-06-10 ASSESSMENT — VISUAL ACUITY
OS_CC: 20/20
OS_CC+: -3
OD_CC: 20/60
OD_CC+: -1
METHOD: SNELLEN - LINEAR

## 2025-06-10 ASSESSMENT — SLIT LAMP EXAM - LIDS
COMMENTS: MILD PTOSIS
COMMENTS: NORMAL

## 2025-06-10 NOTE — PATIENT INSTRUCTIONS
RIGHT EYE:     Prednisolone 4x/day   Moxifloxacin 3x/day  Ketorolac 4x/day  Latanoprost at bedtime   Combigan and Dorzolamide twice a day   Artificial tears and erythromycin ointment as needed for irritation    LEFT EYE:     Add Medrol pack orally  Latanoprost nightly   Prednisolone 1x/day   Combigan and Dorzolamide 1x/day     Continue Medrol dose pack for a total of 6 days then stop

## 2025-06-10 NOTE — PROGRESS NOTES
Review of systems for the eyes was negative other than the pertinent positives/negatives listed in the HPI.      Assessment & Plan      Debi Reed is a 54 year old female with the following diagnoses:   1. Post-operative state    2. Pseudophakia, both eyes    3. Lens-induced iridocyclitis, right eye    4. Steroid responder, bilateral    5. History of laser photocoagulation of retina         Postoperative day 1 s/p Explantation of dislocated intraocular lens implant, removal of cortical remnants, synechialysis, pars plana anterior vitrectomy and insertion of secondary scleral fixated intraocular lens, right eye     Doing well  Intraocular pressure high this morning  Keep shield in place at night for 5 days    Start post-operative drops:   Prednisolone four times a day  right eye   Moxifloxacin three times a day right eye   Ketorolac four times a day  right eye   Continue Latanoprost at bedtime both eyes   Combigan and Dorzolamide twice a day right eye, daily left eye   Add Medrol pack orally per JY   Continue methotrexate per JY  Post-operative do's and don'ts reviewed, questions answered  Return precautions reviewed       Patient disposition:   Return in about 1 week (around 6/17/2025) for Miah same day.           Attending Physician Attestation:  Complete documentation of historical and exam elements from today's encounter can be found in the full encounter summary report (not reduplicated in this progress note).  I personally obtained the chief complaint(s) and history of present illness.  I confirmed and edited as necessary the review of systems, past medical/surgical history, family history, social history, and examination findings as documented by others; and I examined the patient myself.  I personally reviewed the relevant tests, images, and reports as documented above.  I formulated and edited as necessary the assessment and plan and discussed the findings and management plan with  the patient and family. . - Stephen Lara MD

## 2025-06-12 ENCOUNTER — OFFICE VISIT (OUTPATIENT)
Dept: OPHTHALMOLOGY | Facility: CLINIC | Age: 55
End: 2025-06-12
Attending: OPHTHALMOLOGY
Payer: COMMERCIAL

## 2025-06-12 DIAGNOSIS — Z98.890 POST-OPERATIVE STATE: Primary | ICD-10-CM

## 2025-06-12 PROCEDURE — 99213 OFFICE O/P EST LOW 20 MIN: CPT | Performed by: OPHTHALMOLOGY

## 2025-06-12 ASSESSMENT — VISUAL ACUITY
OS_CC+: +2
OD_CC: 20/70
OS_CC: 20/25
OD_CC+: -2
METHOD: SNELLEN - LINEAR
CORRECTION_TYPE: GLASSES

## 2025-06-12 ASSESSMENT — TONOMETRY
OS_IOP_MMHG: 13
OD_IOP_MMHG: 11
IOP_METHOD: ICARE

## 2025-06-12 ASSESSMENT — SLIT LAMP EXAM - LIDS
COMMENTS: MILD PTOSIS
COMMENTS: NORMAL

## 2025-06-12 ASSESSMENT — EXTERNAL EXAM - RIGHT EYE: OD_EXAM: NORMAL

## 2025-06-12 ASSESSMENT — EXTERNAL EXAM - LEFT EYE: OS_EXAM: NORMAL

## 2025-06-12 NOTE — PROGRESS NOTES
HPI       Post Op (Ophthalmology) Right Eye    In right eye.  This started months ago.  Quality: Unsure if the va has changed.  Associated symptoms include eye pain, redness, tearing and floaters.  Negative for flashes.  Treatments tried include eye drops.  Pain was noted as 8/10.             Comments    s/p Explantation of dislocated intraocular lens implant, removal of cortical remnants, synechialysis, pars plana anterior vitrectomy and insertion of secondary scleral fixated intraocular lens, right eye   EES is not helping  Latanoprost at bedtime each eye   Prednisolone every day left eye and QID right eye   Dorzolamide BID right eye and every day left eye  Combigan every day left eye and BID right eye   Ketorolac QID right eye  Moxi TID right eye   Medrol dosepack day 4  Ni Ojeda COT 8:08 AM June 12, 2025              Last edited by Ni Ojeda on 6/12/2025  8:08 AM.          Review of systems for the eyes was negative other than the pertinent positives/negatives listed in the HPI.      Assessment & Plan      Debi Reed is a 54 year old female with the following diagnoses:   1. Post-operative state         Postoperative day 3 s/p Explantation of dislocated intraocular lens implant, removal of cortical remnants, synechialysis, pars plana anterior vitrectomy and insertion of secondary scleral fixated intraocular lens, right eye      Here today with continued foreign body sensation that is not improving since surgery   Intraocular pressure greatly improved today     Placed bandage contact lens (14.2 mm Diameter) with improved symptoms  Cont post-operative drops as before:     Prednisolone four times a day  right eye   Moxifloxacin three times a day right eye   Ketorolac four times a day  right eye   Continue Latanoprost at bedtime both eyes   Combigan and Dorzolamide twice a day right eye, daily left eye   Add Medrol pack orally per JY   Continue methotrexate per JY      Post-operative do's and  don'ts reviewed, questions answered  Return precautions reviewed       Patient disposition:   Return in about 5 days (around 6/17/2025) for VT only.           Attending Physician Attestation:  Complete documentation of historical and exam elements from today's encounter can be found in the full encounter summary report (not reduplicated in this progress note).  I personally obtained the chief complaint(s) and history of present illness.  I confirmed and edited as necessary the review of systems, past medical/surgical history, family history, social history, and examination findings as documented by others; and I examined the patient myself.  I personally reviewed the relevant tests, images, and reports as documented above.  I formulated and edited as necessary the assessment and plan and discussed the findings and management plan with the patient and family. . - Stephen Lara MD

## 2025-06-13 ENCOUNTER — TELEPHONE (OUTPATIENT)
Dept: OPHTHALMOLOGY | Facility: CLINIC | Age: 55
End: 2025-06-13
Payer: COMMERCIAL

## 2025-06-14 ENCOUNTER — TELEPHONE (OUTPATIENT)
Dept: OPHTHALMOLOGY | Facility: CLINIC | Age: 55
End: 2025-06-14
Payer: COMMERCIAL

## 2025-06-14 NOTE — TELEPHONE ENCOUNTER
"Brief Ophthalmology Note:  POC: POD4 s/p explantation of dislocated intraocular lens implant, removal of cortical remnants, synechialysis, pars plana anterior vitrectomy and insertion of secondary scleral fixated IOL, right eye. Prior to surgery comanaged w/ Dr. Rich for acute iritis of left eye and lens induced iridocyclitis right eye. Last clinic visit 6/12/25 w/ Dr. Lara with BCL placed OD    Medications:  25mg PO methotrexate daily  Tears each eye  Latanoprost at bedtime each eye  Prednioslone QID right eye and daily OS  Dorzolamide BID right eye, daily left eye   Combigan BID right eye, daily left eye   Medrol dose pack   Moxifloxacin TID right eye  Ketorolac QID right eye    Patient reports foreign body sensation worse after surgery and improved with BCL. Patient reports slightly more blurry vision in right eye this morning after waking up.  She denies increased eye pain, headache, increased redness, flashes, new floaters, and marginal improvement with application of artificial tears. Denies fevers. Relays hx of bilateral retinal detachments years ago.  Denies curtain and states \"this does not look like my prior retinal detachments.\"    Patient expresses that she feels anxious about the vision change and is seeking reassurance. Concerned that this might be a recurrence of her iritis.  Given absence of pain and current treatment with prednisolone and medrol dose pack and methotrexate, unlikely to represent iritis flare. Advised patient that it would be reasonable to continue to monitor her symptoms at home and follow up as scheduled on Tuesday, given that surface dryness v. BCL v. Corneal edema could all be contributing and non-emergent or come in to be seen at [facility] ED.  Patient elected to monitor at this time and call if vision got worse or she began to experience new symptoms.    Return precautions discussed for RD and iritis.    Dheeraj Domínguez MD  Resident Physician, PGY-2  Department of " Ophthalmology  June 13, 2025

## 2025-06-15 NOTE — TELEPHONE ENCOUNTER
"Patient called at 7:30 pm , described that she is having continued \"ache and heavy sensation\" of her right eye (now POD5 IOL explanation) that has not resolved since discussion with resident Dheeraj Domínguez on 6/13/2025. She denies curtain over her vision, floaters, flashes, increase in redness, large increase in pain, and a decrease in her vision. I reassured her that based on these symptoms she can continue her current regimen and follow-up on Tuesday as indicated. If she is to have any of the aforementioned symptoms she should call back to discuss or go to emergency room for evaluation.    Albert Moore MD on 6/14/2025 at 7:34 PM    "

## 2025-06-17 ENCOUNTER — OFFICE VISIT (OUTPATIENT)
Dept: OPHTHALMOLOGY | Facility: CLINIC | Age: 55
End: 2025-06-17
Attending: OPHTHALMOLOGY
Payer: COMMERCIAL

## 2025-06-17 DIAGNOSIS — Z96.1 HISTORY OF SECONDARY INTRAOCULAR LENS IMPLANT: ICD-10-CM

## 2025-06-17 DIAGNOSIS — H20.21 LENS-INDUCED IRIDOCYCLITIS, RIGHT EYE: ICD-10-CM

## 2025-06-17 DIAGNOSIS — H20.00 ACUTE IRITIS OF LEFT EYE: ICD-10-CM

## 2025-06-17 DIAGNOSIS — H40.043 STEROID RESPONDER, BILATERAL: ICD-10-CM

## 2025-06-17 DIAGNOSIS — Z96.1 HISTORY OF SECONDARY INTRAOCULAR LENS IMPLANT: Primary | ICD-10-CM

## 2025-06-17 DIAGNOSIS — Z79.899 HIGH RISK MEDICATION USE: ICD-10-CM

## 2025-06-17 DIAGNOSIS — Z98.890 POST-OPERATIVE STATE: Primary | ICD-10-CM

## 2025-06-17 PROCEDURE — 99024 POSTOP FOLLOW-UP VISIT: CPT | Performed by: OPHTHALMOLOGY

## 2025-06-17 PROCEDURE — 99213 OFFICE O/P EST LOW 20 MIN: CPT | Performed by: OPHTHALMOLOGY

## 2025-06-17 PROCEDURE — 99207 FUNDUS PHOTOS OU (BOTH EYES): CPT | Mod: 26 | Performed by: OPHTHALMOLOGY

## 2025-06-17 PROCEDURE — 92250 FUNDUS PHOTOGRAPHY W/I&R: CPT | Performed by: OPHTHALMOLOGY

## 2025-06-17 RX ORDER — MAGNESIUM 200 MG
TABLET ORAL
COMMUNITY

## 2025-06-17 RX ORDER — BUSPIRONE HYDROCHLORIDE 15 MG/1
TABLET ORAL
COMMUNITY
Start: 2025-06-13

## 2025-06-17 ASSESSMENT — CONF VISUAL FIELD
OS_INFERIOR_TEMPORAL_RESTRICTION: 0
OD_SUPERIOR_NASAL_RESTRICTION: 0
OD_NORMAL: 1
OD_INFERIOR_NASAL_RESTRICTION: 0
OS_INFERIOR_TEMPORAL_RESTRICTION: 0
OS_INFERIOR_NASAL_RESTRICTION: 0
OD_SUPERIOR_TEMPORAL_RESTRICTION: 0
METHOD: COUNTING FINGERS
OD_SUPERIOR_TEMPORAL_RESTRICTION: 0
METHOD: COUNTING FINGERS
OS_INFERIOR_NASAL_RESTRICTION: 0
OS_SUPERIOR_TEMPORAL_RESTRICTION: 0
OS_SUPERIOR_NASAL_RESTRICTION: 0
OD_SUPERIOR_NASAL_RESTRICTION: 0
OS_NORMAL: 1
OS_SUPERIOR_TEMPORAL_RESTRICTION: 0
OD_INFERIOR_NASAL_RESTRICTION: 0
OD_NORMAL: 1
OS_NORMAL: 1
OS_SUPERIOR_NASAL_RESTRICTION: 0
OD_INFERIOR_TEMPORAL_RESTRICTION: 0
OD_INFERIOR_TEMPORAL_RESTRICTION: 0

## 2025-06-17 ASSESSMENT — VISUAL ACUITY
OS_PH_SC+: +3
OD_SC: 20/150
METHOD: SNELLEN - LINEAR
OS_PH_SC+: +3
OS_PH_SC: 20/30
METHOD: SNELLEN - LINEAR
OS_SC: 20/50
CORRECTION_TYPE: CONTACTS
CORRECTION_TYPE: CONTACTS
OS_PH_SC: 20/30
OS_SC: 20/50
OD_SC: 20/150

## 2025-06-17 ASSESSMENT — CUP TO DISC RATIO
OS_RATIO: 0.5
OD_RATIO: 0.55
OD_RATIO: 0.55
OS_RATIO: 0.5

## 2025-06-17 ASSESSMENT — SLIT LAMP EXAM - LIDS
COMMENTS: SUBTLE PTOSIS
COMMENTS: SUBTLE PTOSIS
COMMENTS: NORMAL
COMMENTS: NORMAL

## 2025-06-17 ASSESSMENT — TONOMETRY
IOP_METHOD: TONOPEN
OS_IOP_MMHG: 6
OD_IOP_MMHG: 16
IOP_METHOD: TONOPEN
OS_IOP_MMHG: 6
OD_IOP_MMHG: 16

## 2025-06-17 ASSESSMENT — EXTERNAL EXAM - LEFT EYE
OS_EXAM: NORMAL
OS_EXAM: NORMAL

## 2025-06-17 ASSESSMENT — EXTERNAL EXAM - RIGHT EYE
OD_EXAM: NORMAL
OD_EXAM: NORMAL

## 2025-06-17 NOTE — PROGRESS NOTES
"HPI       Post Op (Ophthalmology) Right Eye     Additional comments: Information pulled forward from visit earlier today.  PO W#1; S/p Removal of dislocated intraocular lens implant, insertion of scleral fixated intraocular lens implant, with removal cortical remnants, combined with anterior vitrectomy, right eye (6/09/2025)               Comments    Patient is here for a double appointment, for uveitis and post op combined. Seems vision has improved since here last but has been tough per patient. Achiness, has been taking OTC pain relievers, alternating with Tylenol and Advil, still feeling uncomfortable. Headache has been on and off, not there today. No nausea. Has had redness front of eye temporally, seems improved per patient. Photosensitivity. Floaters present, seems slightly decreased, over the weekend, seemed \"swirly, like a flare up,\" now looking more murky. Has not noticed any flashes of light.     Ocular medications:   Prednisolone four times daily right eye, once daily left eye   Moxifloxacin three times daily right eye   Ketorolac four times right eye   Dorzolamide twice daily right eye, once daily left eye   Combigan twice daily right eye, once daily left eye   Latanoprost once at bedtime right eye, once daily left eye   Systane Hydration PF as needed, liberally, both eyes   Methotrexate 10 tablets once weekly by mouth (missed 06/07/2025)   Folic acid once daily by mouth     Halle Smith on 6/17/2025 at 8:30 AM             Last edited by Halle Smith on 6/17/2025  8:48 AM.          Review of systems for the eyes was negative other than the pertinent positives/negatives listed in the HPI.      Assessment & Plan      Debi Reed is a 54 year old female with the following diagnoses:   1. Post-operative state    2. Lens-induced iridocyclitis, right eye    3. History of secondary intraocular lens implant         Postoperative day 8 s/p Explantation of dislocated intraocular lens implant, " removal of cortical remnants, synechialysis, pars plana anterior vitrectomy and insertion of secondary scleral fixated intraocular lens, right eye   Bandage contact lens helping  Removed temporal vicryl suture today, but persistent foreign body sensation   Replaced bandage contact lens (14.2 mm Diameter) with improved symptoms    Cont post-operative drops:     Prednisolone four times a day  right eye   Moxifloxacin three times a day right eye   Reduce Ketorolac to TWO times a day  right eye   Continue Latanoprost at bedtime both eyes   Combigan and Dorzolamide twice a day right eye, daily left eye    Continue methotrexate per JY   Preservative free artificial tears every hour as needed       Patient disposition:   Return in about 2 days (around 6/19/2025) for VT only.           Attending Physician Attestation:  Complete documentation of historical and exam elements from today's encounter can be found in the full encounter summary report (not reduplicated in this progress note).  I personally obtained the chief complaint(s) and history of present illness.  I confirmed and edited as necessary the review of systems, past medical/surgical history, family history, social history, and examination findings as documented by others; and I examined the patient myself.  I personally reviewed the relevant tests, images, and reports as documented above.  I formulated and edited as necessary the assessment and plan and discussed the findings and management plan with the patient and family. . - Stephen Lara MD

## 2025-06-17 NOTE — PROGRESS NOTES
Chief Complaint/Presenting Concern: Uveitis post op    Interval History of Present Ocular Illness:  Debi Reed is a 54 year old patient who returns for follow up after her IOL explantation procedure and secondary scleral fixated IOL in the right eye on 6/9/25 with Dr. Lara.  At the most recent visit with Dr. Lara on 6/12/25, Debi had ongoing inflammation in the front of the eye but also significant irritation of the cornea.  A bandage contact lens was placed on the right eye.    Debi called over the weekend with some ongoing pressure sensation and more cloudiness like a flare in the right eye which started Friday, 6/14/25. She spoke to a few of our Resident Physicians. Today, she notes the vision is bit clearer, and there is some achiness and it feels better laying down versus upright. Left eye feels okay.    Interval Updates to Medical/Family/Social History:  One dose of methotrexate was held on 6/7/25.    Relevant Review of Systems Updates:  Feeling more tired, headaches.     Current eye related medications:   Drops: Prednisolone 4 times a day right eye and once daily left eye, dorzolamide 2 times a day right eye and once daily left eye, Combigan 2 times a day right eye once daily left eye, ketorolac 4 times a day right eye, moxifloxacin 3 times a day right eye, no erythromycin ointment, lots of artificial tears  Systemic: Oral Methotrexate 25 mg (10 pills) weekly, Folic acid 1 mg daily, medrol dose pack completed    No Imaging    Assessment:     1. History of secondary intraocular lens implant - Right Eye (Primary)  2. Lens-induced iridocyclitis, right eye  Post op day 8 with appropriate degree of inflammation     3. Acute iritis of left eye  Improved     4. Steroid responder, bilateral  IOP 16 right eye and 6 left eye     5. High risk medication use  Oral Methotrexate 25 mg (10 pills), Folic acid 1 mg daily, medrol dose pack completed.    Plan/Recommendations:    Discussed findings with patient  and her Friend. The right eye is improving and after BCL removal, we saw some inflammation but this is stable. The IOL is stable. WE can keep drops unchanged without any more oral steroid.   The left eye has minimal cell and we can keep drops all the same  Eye pressure is 16,6. We should continue pressure drops per Dr. Lara  Continue these medications unchanged: Oral Methotrexate 25 mg (10 pills) weekly, Folic acid 1 mg daily  No medrol dose pack needed  Continue Drops unchanged: Prednisolone 4 times a day right eye and once daily left eye, dorzolamide 2 times a day right eye and once daily left eye, Combigan 2 times a day right eye once daily left eye, ketorolac 4 times a day right eye, moxifloxacin 3 times a day right eye if BCL placed, artificial tears  No new treatments needed  If you have issues in the future at night or after hours and talk to a Resident Doctor, please ask them to have Dr. Rich call you.    RTC Dr. Lara today, Then PHILL same day on 7/8.    Physician Attestation     Attending Physician Attestation:  Complete documentation of historical and exam elements from today's encounter can be found in the full encounter summary report (not reduplicated in this progress note). I personally obtained the chief complaint(s) and history of present illness. I confirmed and edited as necessary the review of systems, past medical/surgical history, family history, social history, and examination findings as documented by others; and I examined the patient myself. I personally reviewed the relevant tests, images, and reports as documented above. I formulated and edited as necessary the assessment and plan and discussed the findings and management plan with the patient and family members present at the time of this visit.  Dheeraj Rich M.D., Uveitis and Medical Retina, June 17, 2025

## 2025-06-17 NOTE — PATIENT INSTRUCTIONS
If you have issues after hours and talk to a Resident, please ask them to reach out to Layla to call you.  Continue these medications unchanged: Oral Methotrexate 25 mg (10 pills) weekly, Folic acid 1 mg daily.   No medrol dose pack needed  Continue Drops unchanged: Prednisolone 4 times a day right eye and once daily left eye, dorzolamide 2 times a day right eye and once daily left eye, Combigan 2 times a day right eye once daily left eye, ketorolac 4 times a day right eye, moxifloxacin 3 times a day right eye if BCL placed, artificial tears  No new treatments needed

## 2025-06-19 ENCOUNTER — OFFICE VISIT (OUTPATIENT)
Dept: OPHTHALMOLOGY | Facility: CLINIC | Age: 55
End: 2025-06-19
Attending: OPHTHALMOLOGY
Payer: COMMERCIAL

## 2025-06-19 DIAGNOSIS — H40.051 OCULAR HYPERTENSION, RIGHT EYE: ICD-10-CM

## 2025-06-19 DIAGNOSIS — Z96.1 HISTORY OF SECONDARY INTRAOCULAR LENS IMPLANT: Primary | ICD-10-CM

## 2025-06-19 PROCEDURE — 99212 OFFICE O/P EST SF 10 MIN: CPT | Performed by: OPHTHALMOLOGY

## 2025-06-19 ASSESSMENT — CUP TO DISC RATIO: OD_RATIO: 0.55

## 2025-06-19 ASSESSMENT — EXTERNAL EXAM - LEFT EYE: OS_EXAM: NORMAL

## 2025-06-19 ASSESSMENT — VISUAL ACUITY
METHOD: SNELLEN - LINEAR
OS_CC: 20/20
CORRECTION_TYPE: GLASSES
OD_PH_CC: 20/40
OD_CC: 20/50

## 2025-06-19 ASSESSMENT — TONOMETRY
OD_IOP_MMHG: 25
IOP_METHOD: ICARE
OD_IOP_MMHG: 25
OD_IOP_MMHG: 27
OS_IOP_MMHG: 16
OD_IOP_MMHG: 25

## 2025-06-19 ASSESSMENT — SLIT LAMP EXAM - LIDS
COMMENTS: SUBTLE PTOSIS
COMMENTS: NORMAL

## 2025-06-19 ASSESSMENT — EXTERNAL EXAM - RIGHT EYE: OD_EXAM: NORMAL

## 2025-06-19 NOTE — PROGRESS NOTES
"HPI    Prednisolone four times a day  right eye   Moxifloxacin three times a day right eye   Reduce Ketorolac to TWO times a day  right eye   Vision od - uncertain if changed.   Pain od 5-6/10.   Photophobia od>os.     Increase of floaters yesterday od.   Denies flashes, shade, shadow.     Ocular meds:   Latanoprost at bedtime both eyes   Combigan and Dorzolamide twice a day right eye, daily left eye    Methotrexate as directed   Preservative free artificial tears every hour as needed- \"whenever needed\"  Pred qid od every day os  Ketorolac od bid   Moxi od tid   Erythomycin viry at bedtime - not using now with BRICE MONROY, June 19, 2025 8:07 AM        Last edited by Luly Chang, PORFIRIO on 6/19/2025  8:07 AM.          Review of systems for the eyes was negative other than the pertinent positives/negatives listed in the HPI.      Assessment & Plan      Debi Reed is a 54 year old female with the following diagnoses:   1. History of secondary intraocular lens implant    2. Ocular hypertension, right eye           Postoperative day 8 s/p Explantation of dislocated intraocular lens implant, removal of cortical remnants, synechialysis, pars plana anterior vitrectomy and insertion of secondary scleral fixated intraocular lens, right eye     Replaced bandage contact lens again today (14.2 mm Diameter)   Intraocular pressure elevated  Retina intact and lens stable     Cont post-operative drops:     Taper Prednisolone to THREE times a day  right eye   Continue Moxifloxacin three times a day right eye   STOP Ketorolac to TWO times a day  right eye   Continue Latanoprost at bedtime both eyes   Combigan and Dorzolamide twice a day right eye, daily left eye    Continue methotrexate per JY   Preservative free artificial tears every hour as needed       Patient disposition:   Return in about 1 week (around 6/26/2025) for VT only.           Attending Physician Attestation:  Complete documentation of historical " and exam elements from today's encounter can be found in the full encounter summary report (not reduplicated in this progress note).  I personally obtained the chief complaint(s) and history of present illness.  I confirmed and edited as necessary the review of systems, past medical/surgical history, family history, social history, and examination findings as documented by others; and I examined the patient myself.  I personally reviewed the relevant tests, images, and reports as documented above.  I formulated and edited as necessary the assessment and plan and discussed the findings and management plan with the patient and family. . - Stephen Lara MD

## 2025-06-19 NOTE — PATIENT INSTRUCTIONS
RIGHT EYE:     REDUCE Prednisolone 3x/day   Moxifloxacin 3x/day  Latanoprost at bedtime   Combigan and Dorzolamide twice a day   Artificial tears as needed for irritation    STOP Ketorolac     LEFT EYE:     Latanoprost nightly   Prednisolone 1x/day   Combigan and Dorzolamide 1x/day

## 2025-06-24 ENCOUNTER — TELEPHONE (OUTPATIENT)
Dept: OPHTHALMOLOGY | Facility: CLINIC | Age: 55
End: 2025-06-24

## 2025-06-24 ENCOUNTER — OFFICE VISIT (OUTPATIENT)
Dept: OPHTHALMOLOGY | Facility: CLINIC | Age: 55
End: 2025-06-24
Attending: OPHTHALMOLOGY
Payer: COMMERCIAL

## 2025-06-24 ENCOUNTER — NURSE TRIAGE (OUTPATIENT)
Dept: NURSING | Facility: CLINIC | Age: 55
End: 2025-06-24

## 2025-06-24 DIAGNOSIS — Z98.890 POST-OPERATIVE STATE: Primary | ICD-10-CM

## 2025-06-24 DIAGNOSIS — H40.051 OCULAR HYPERTENSION, RIGHT EYE: ICD-10-CM

## 2025-06-24 DIAGNOSIS — Z96.1 HISTORY OF SECONDARY INTRAOCULAR LENS IMPLANT: ICD-10-CM

## 2025-06-24 PROCEDURE — 99212 OFFICE O/P EST SF 10 MIN: CPT | Performed by: OPHTHALMOLOGY

## 2025-06-24 PROCEDURE — 99024 POSTOP FOLLOW-UP VISIT: CPT | Performed by: OPHTHALMOLOGY

## 2025-06-24 PROCEDURE — 99213 OFFICE O/P EST LOW 20 MIN: CPT | Performed by: OPHTHALMOLOGY

## 2025-06-24 ASSESSMENT — CONF VISUAL FIELD
OS_NORMAL: 1
OD_INFERIOR_TEMPORAL_RESTRICTION: 0
OS_SUPERIOR_TEMPORAL_RESTRICTION: 0
METHOD: COUNTING FINGERS
OD_SUPERIOR_NASAL_RESTRICTION: 0
OS_INFERIOR_TEMPORAL_RESTRICTION: 0
OS_SUPERIOR_NASAL_RESTRICTION: 0
OS_INFERIOR_NASAL_RESTRICTION: 0
OD_INFERIOR_NASAL_RESTRICTION: 0
OD_SUPERIOR_TEMPORAL_RESTRICTION: 0
OD_NORMAL: 1

## 2025-06-24 ASSESSMENT — REFRACTION_WEARINGRX
OD_ADD: +2.75
OS_SPHERE: -1.00
OS_ADD: +2.75
OS_CYLINDER: +1.00
OD_CYLINDER: +0.50
OS_AXIS: 120
SPECS_TYPE: PAL
OD_AXIS: 010
OD_SPHERE: -2.25

## 2025-06-24 ASSESSMENT — VISUAL ACUITY
METHOD: SNELLEN - LINEAR
OD_CC+: -2
METHOD: SNELLEN - LINEAR
OS_CC: 20/40
OD_PH_CC: 20/30
CORRECTION_TYPE: GLASSES
OD_CC: 20/80
CORRECTION_TYPE: GLASSES
OS_CC: 20/30
OD_CC: 20/50

## 2025-06-24 ASSESSMENT — TONOMETRY
OD_IOP_MMHG: 14
OS_IOP_MMHG: 10
OS_IOP_MMHG: 15
OD_IOP_MMHG: 10
IOP_METHOD: ICARE
IOP_METHOD: ICARE

## 2025-06-24 ASSESSMENT — SLIT LAMP EXAM - LIDS
COMMENTS: SUBTLE PTOSIS
COMMENTS: NORMAL
COMMENTS: SUBTLE PTOSIS
COMMENTS: NORMAL

## 2025-06-24 ASSESSMENT — EXTERNAL EXAM - RIGHT EYE
OD_EXAM: NORMAL
OD_EXAM: NORMAL

## 2025-06-24 ASSESSMENT — CUP TO DISC RATIO: OD_RATIO: 0.55

## 2025-06-24 ASSESSMENT — EXTERNAL EXAM - LEFT EYE
OS_EXAM: NORMAL
OS_EXAM: NORMAL

## 2025-06-24 NOTE — PATIENT INSTRUCTIONS
RIGHT EYE:     REDUCE Prednisolone 2x/day   Stop Moxifloxacin     Latanoprost at bedtime   Combigan and Dorzolamide twice a day   Artificial tears as needed for irritation    STOP Ketorolac     LEFT EYE:     Latanoprost nightly   Prednisolone 1x/day   Combigan and Dorzolamide 1x/day

## 2025-06-24 NOTE — TELEPHONE ENCOUNTER
Caller reporting the following red-flag symptom(s): Eye pain after CL removed today    Per the system red-flag symptom policy, patient was instructed to:  speak with a Registered Nurse    Action:  Patient warm transferred to a Registered Nurse, [place]

## 2025-06-24 NOTE — TELEPHONE ENCOUNTER
"Nurse Triage SBAR     Is this a 2nd Level Triage? YES, LICENSED PRACTITIONER REVIEW IS REQUIRED    Situation:  Patient calling in with increased pain (7/10) after having bandage contact lens removed today in clinic     Background: Patient of Dr. Lara and recent Explantation of dislocated intraocular lens implant, removal of cortical remnants, synechialysis, pars plana anterior vitrectomy and insertion of secondary scleral fixated intraocular lens, right eye 6/9/25 and clinic visit today for removal of contact lens bandage now presenting with increased pain and discomfort and would like to have bandage contact lens back on.   LOV:    8:45 AM (15 min)  Arrived   Arrive by:  8:30 AM   RETURN ADULT EYE   [facility] ([facility])   Stephen Lara MD     FOV:   7/8/2025 8:45 AM (15 min)  Tuan    Arrive by:  8:30 AM   POST OP   [facility] ([facility])   Stephen Lara MD     OcHx: Ocular hypertension, right eye, History of secondary intraocular lens implant, Recurrent acute iridocyclitis of right eye, Cataract iol, rt/lt (Bilateral)   Other PMHx: HLD    Meds:  prednisoLONE acetate (PRED FORTE) 1 % ophthalmic suspension, latanoprost (XALATAN) 0.005 % ophthalmic solution,  dorzolamide (TRUSOPT) 2 % ophthalmic solution, methotrexate 2.5 MG tablet, Preservative free artificial tears     Assessment:   ONSET: Today after numbing wore off from bandage removal   TIMING: Constant pain, discomfort   SEVERITY:  Patient rates pain 7/10 - states was given choice of keeping bandage on or removing - thought removing would be okay, she reports \"I'm definitely not okay\"  LOCATION: right eye   CAUSE: removal of bandage contact lens   Is there any known Injury?: No  VISION:  Denies   EYE DISCHARGE: Denies   OTHER EYE SYMPTOMS: Denies   APPEARANCE: Patient does report some swelling left over from procedure, but nothing new   OTHER SYMPTOMS:  Denies   HAS ALREADY TRIED: Eye drops - help a little per patient, \"but caustic\"   ADDITIONAL " INFORMATION:  Gianni would like to visit clinic to have bandage contact lens placed back on for comfort     Protocol Recommended Disposition:   See Today in Office (or tomorrow morning)   - Eye Clinic first priority best equipped to evaluate.    Recommendation: Please call patient for further assessment and recommendations as well as when and where to be seen. Advised to avoid touching/rubbing eyes, use eye drops as prescribed, use cool compress as needed. Advised to CALL BACK IF: Pain worsening, any new vision changes, floaters, flashes of light, Any other new symptoms or questions. Patient verbalizes understanding and is amenable to plan, will wait for callback for further instructions.     Routed to provider/care team.  JP Pratt  P Central Nursing/Red Flag Triage & Med Refill Team      Reason for Disposition   Patient wants to be seen    Additional Information   Negative: Vomiting   Negative: Ulcer or sore seen on the cornea (clear center part of the eye)   Negative: Recent eye surgery and increasing eye pain   Negative: Blurred vision and new or worsening   Negative: Patient sounds very sick or weak to the triager   Negative: Severe eye pain   Negative: Complete loss of vision in one or both eyes   Negative: Eyelids are very swollen (shut or almost) and fever   Negative: Eyelid (outer) is very red and fever   Negative: Foreign body sensation ('feels like something is in there') and irrigation didn't help   Negative: Followed an eye injury   Negative: Eye pain from chemical in the eye   Negative: Eye pain from foreign body in eye   Negative: Has sinus pain or pressure   Negative: Eye pain/discomfort and more than mild   Negative: Eyelids are very swollen (shut or almost) and no fever   Negative: Painful rash near eye and multiple small blisters grouped together   Negative: Pain followed bright light exposure from welding   Negative: Looking at light causes severe pain (i.e., photophobia)   Negative: Eye pain  "present > 24 hours   Negative: Yellow or green pus occurs    Answer Assessment - Initial Assessment Questions  1. ONSET: After numbing wore off today  2. TIMING: constant   3. SEVERITY: Patient reports pain 7/10   4. LOCATION: Right eye  5. CAUSE: Patient reports having the option of removing bandage contact lens or keeping on, per patient \"I thought I would be fine to remove it and clearly I'm not and would just like to have the bandage put back on\"  6. VISION: Denies vision changes   7. EYE DISCHARGE: Denies discharge   8. FEVER: Denies fever   9. OTHER SYMPTOMS: Denies all other symptoms   10. PREGNANCY: NA    Protocols used: Eye Pain-A-[place]    "

## 2025-06-24 NOTE — PROGRESS NOTES
HPI       Eye Pain      In right eye.  Pain was noted as 10/10.  Associated symptoms include blurred vision and redness. Additional comments: Eye Pain right eye             Comments    Patient says right eye has been in a lot of pain since the BCL came out this morning. She thought the pain would get better but its not.    Danae Doty 4:31 PM 06/24/2025            Last edited by Danae Doty, NP on 6/24/2025  4:31 PM.          Review of systems for the eyes was negative other than the pertinent positives/negatives listed in the HPI.      Assessment & Plan      Debi Reed is a 54 year old female with the following diagnoses:   1. Post-operative state         Bandage contact lens removed this am.  Did well initially, but pain worsening throughout the day  Intraocular pressure remains within normal limits  K intact    Bandage contact lens replaced  Will resume moxi three times a day and continue other drops as discussed this morning    Return precautions reviewed         Patient disposition:   Return in about 1 week (around 7/1/2025) for VT only.           Attending Physician Attestation:  Complete documentation of historical and exam elements from today's encounter can be found in the full encounter summary report (not reduplicated in this progress note).  I personally obtained the chief complaint(s) and history of present illness.  I confirmed and edited as necessary the review of systems, past medical/surgical history, family history, social history, and examination findings as documented by others; and I examined the patient myself.  I personally reviewed the relevant tests, images, and reports as documented above.  I formulated and edited as necessary the assessment and plan and discussed the findings and management plan with the patient and family. . - Stephen Lara MD

## 2025-06-24 NOTE — TELEPHONE ENCOUNTER
Spoke to pt and pt coming back to have bandage contact lens placed-- will try to be here at 4 PM    Kyrie Gutierrez RN 3:49 PM 06/24/25

## 2025-06-24 NOTE — PROGRESS NOTES
HPI       Follow Up    In both eyes.  Associated symptoms include dryness, eye pain, photophobia and flashes.  Treatments tried include eye drops and artificial tears. Additional comments: Taper Prednisolone to THREE times a day  right eye   Moxifloxacin three times a day right eye   Latanoprost at bedtime both eyes   Combigan and Dorzolamide twice a day right eye, daily left eye    Continue methotrexate per JY   Preservative free artificial tears every hour as needed                Comments    Patient reports seeing bubbles floating up in the right eye. One or two flashes.     Danae Doty 8:35 AM 06/24/2025            Last edited by Danae Doty NP on 6/24/2025  8:40 AM.          Review of systems for the eyes was negative other than the pertinent positives/negatives listed in the HPI.      Assessment & Plan      Debi Reed is a 54 year old female with the following diagnoses:   1. Post-operative state    2. Ocular hypertension, right eye    3. History of secondary intraocular lens implant           s/p Explantation of dislocated intraocular lens implant, removal of cortical remnants, synechialysis, pars plana anterior vitrectomy and insertion of secondary scleral fixated intraocular lens, right eye 6/9/25     Removed bandage contact lens today.  Sutures now resorbed  Intraocular pressure improved  Retina intact and lens stable     Cont post-operative drops:     Taper Prednisolone to TWO times a day  right eye   Stop Moxifloxacin   Continue Latanoprost at bedtime both eyes   Combigan and Dorzolamide twice a day right eye, daily left eye    Continue methotrexate per JY  Preservative free artificial tears every hour as needed        Patient disposition:   Return in about 2 weeks (around 7/8/2025) for VT only, Refraction.           Attending Physician Attestation:  Complete documentation of historical and exam elements from today's encounter can be found in the full encounter summary report (not reduplicated  in this progress note).  I personally obtained the chief complaint(s) and history of present illness.  I confirmed and edited as necessary the review of systems, past medical/surgical history, family history, social history, and examination findings as documented by others; and I examined the patient myself.  I personally reviewed the relevant tests, images, and reports as documented above.  I formulated and edited as necessary the assessment and plan and discussed the findings and management plan with the patient and family. . - Stephen Lara MD

## 2025-07-02 ENCOUNTER — OFFICE VISIT (OUTPATIENT)
Dept: OPHTHALMOLOGY | Facility: CLINIC | Age: 55
End: 2025-07-02
Attending: OPHTHALMOLOGY
Payer: COMMERCIAL

## 2025-07-02 DIAGNOSIS — H20.21 LENS-INDUCED IRIDOCYCLITIS, RIGHT EYE: ICD-10-CM

## 2025-07-02 DIAGNOSIS — Z96.1 HISTORY OF SECONDARY INTRAOCULAR LENS IMPLANT: ICD-10-CM

## 2025-07-02 DIAGNOSIS — Z98.890 POST-OPERATIVE STATE: Primary | ICD-10-CM

## 2025-07-02 DIAGNOSIS — H40.051 OCULAR HYPERTENSION, RIGHT EYE: ICD-10-CM

## 2025-07-02 PROCEDURE — 99213 OFFICE O/P EST LOW 20 MIN: CPT | Performed by: OPHTHALMOLOGY

## 2025-07-02 PROCEDURE — 99024 POSTOP FOLLOW-UP VISIT: CPT | Performed by: OPHTHALMOLOGY

## 2025-07-02 RX ORDER — ATROPINE SULFATE 10 MG/ML
1-2 SOLUTION/ DROPS OPHTHALMIC 2 TIMES DAILY
Qty: 10 ML | Refills: 1 | Status: SHIPPED | OUTPATIENT
Start: 2025-07-02

## 2025-07-02 RX ORDER — BRIMONIDINE TARTRATE AND TIMOLOL MALEATE 2; 5 MG/ML; MG/ML
1 SOLUTION OPHTHALMIC 2 TIMES DAILY
Qty: 15 ML | Refills: 3 | Status: SHIPPED | OUTPATIENT
Start: 2025-07-02

## 2025-07-02 ASSESSMENT — SLIT LAMP EXAM - LIDS
COMMENTS: PROTECTIVE PTOSIS
COMMENTS: NORMAL

## 2025-07-02 ASSESSMENT — VISUAL ACUITY
METHOD: NUMBERS - LINEAR
OD_PH_CC: 20/60
OD_CC: 20/100
OS_CC: 20/30
CORRECTION_TYPE: GLASSES

## 2025-07-02 ASSESSMENT — TONOMETRY
OD_IOP_MMHG: 19
OS_IOP_MMHG: 22
OD_IOP_MMHG: 23
IOP_METHOD: TONOPEN
IOP_METHOD: ICARE

## 2025-07-02 ASSESSMENT — EXTERNAL EXAM - RIGHT EYE: OD_EXAM: NORMAL

## 2025-07-02 ASSESSMENT — EXTERNAL EXAM - LEFT EYE: OS_EXAM: NORMAL

## 2025-07-02 NOTE — PROGRESS NOTES
HPI       Follow Up    In right eye.  Since onset it is stable.  Associated symptoms include eye pain, photophobia and flashes.  Negative for floaters.  Treatments tried include eye drops.             Comments    Here for follow up. VA is blurry. No pain. Stable flashes without floaters. Using drops as instructed.    FEMI Cole 8:10 AM July 2, 2025             Last edited by Grabiel Lee COMT on 7/2/2025  8:18 AM.          Review of systems for the eyes was negative other than the pertinent positives/negatives listed in the HPI.      Assessment & Plan      Debi Reed is a 54 year old female with the following diagnoses:   1. Post-operative state    2. History of secondary intraocular lens implant    3. Lens-induced iridocyclitis, right eye    4. Ocular hypertension, right eye         s/p Explantation of dislocated intraocular lens implant, removal of cortical remnants, synechialysis, pars plana anterior vitrectomy and insertion of secondary scleral fixated intraocular lens, right eye 6/9/25  Has done well since last week    Removed bandage contact lens today.    Intraocular pressure 19 mm Hg today  Healing well  Keratitis likely due to prolonged bandage contact lens      RIGHT EYE:     Continue Prednisolone 2x/day   Start atropine daily   Restart erythromycin ointment three times a day   Ok to use preservative free artificial tears every 30-60 min as needed for irritation  Stop Moxifloxacin     Latanoprost at bedtime   Combigan and Dorzolamide twice a day   Artificial tears as needed for irritation      LEFT EYE:     Latanoprost nightly   Prednisolone 1x/day   Combigan and Dorzolamide 1x/day     Patient disposition:   Return in about 1 week (around 7/9/2025) for Refraction, DFE, OCT Macula.           Attending Physician Attestation:  Complete documentation of historical and exam elements from today's encounter can be found in the full encounter summary report (not reduplicated in this progress note).  I  personally obtained the chief complaint(s) and history of present illness.  I confirmed and edited as necessary the review of systems, past medical/surgical history, family history, social history, and examination findings as documented by others; and I examined the patient myself.  I personally reviewed the relevant tests, images, and reports as documented above.  I formulated and edited as necessary the assessment and plan and discussed the findings and management plan with the patient and family. . - Stephen Lara MD

## 2025-07-02 NOTE — PATIENT INSTRUCTIONS
RIGHT EYE:     Continue Prednisolone 2x/day   Start atropine daily   Restart erythromycin ointment three times a day   Ok to use preservative free artificial tears every 30-60 min as needed for irritation  Stop Moxifloxacin     Latanoprost at bedtime   Combigan and Dorzolamide twice a day   Artificial tears as needed for irritation    STOP Ketorolac     LEFT EYE:     Latanoprost nightly   Prednisolone 1x/day   Combigan and Dorzolamide 1x/day

## 2025-07-08 ENCOUNTER — OFFICE VISIT (OUTPATIENT)
Dept: OPHTHALMOLOGY | Facility: CLINIC | Age: 55
End: 2025-07-08
Attending: OPHTHALMOLOGY
Payer: COMMERCIAL

## 2025-07-08 DIAGNOSIS — Z98.890 POST-OPERATIVE STATE: Primary | ICD-10-CM

## 2025-07-08 DIAGNOSIS — H20.00 ACUTE IRITIS OF LEFT EYE: ICD-10-CM

## 2025-07-08 DIAGNOSIS — H40.043 STEROID RESPONDER, BILATERAL: ICD-10-CM

## 2025-07-08 DIAGNOSIS — H20.21 LENS-INDUCED IRIDOCYCLITIS, RIGHT EYE: Primary | ICD-10-CM

## 2025-07-08 DIAGNOSIS — Z96.1 HISTORY OF SECONDARY INTRAOCULAR LENS IMPLANT: ICD-10-CM

## 2025-07-08 DIAGNOSIS — Z79.899 HIGH RISK MEDICATION USE: ICD-10-CM

## 2025-07-08 DIAGNOSIS — Z98.890 HISTORY OF VITRECTOMY: ICD-10-CM

## 2025-07-08 DIAGNOSIS — H20.21 LENS-INDUCED IRIDOCYCLITIS, RIGHT EYE: ICD-10-CM

## 2025-07-08 DIAGNOSIS — H40.051 OCULAR HYPERTENSION, RIGHT EYE: ICD-10-CM

## 2025-07-08 PROCEDURE — 99211 OFF/OP EST MAY X REQ PHY/QHP: CPT | Performed by: OPHTHALMOLOGY

## 2025-07-08 PROCEDURE — 99213 OFFICE O/P EST LOW 20 MIN: CPT | Performed by: OPHTHALMOLOGY

## 2025-07-08 PROCEDURE — 92134 CPTRZ OPH DX IMG PST SGM RTA: CPT | Performed by: OPHTHALMOLOGY

## 2025-07-08 PROCEDURE — 99024 POSTOP FOLLOW-UP VISIT: CPT | Performed by: OPHTHALMOLOGY

## 2025-07-08 ASSESSMENT — REFRACTION_MANIFEST
OD_AXIS: 010
OS_AXIS: 110
OS_AXIS: 110
OD_SPHERE: -2.25
OS_SPHERE: -1.50
OD_CYLINDER: +4.50
OD_AXIS: 010
OD_CYLINDER: +4.50
OS_SPHERE: -1.50
OD_ADD: +2.50
OD_AXIS: 010
OS_CYLINDER: +1.00
OS_SPHERE: -1.00
OS_SPHERE: -1.00
OS_CYLINDER: +0.75
OS_ADD: +2.50
OS_CYLINDER: +0.75
OS_AXIS: 110
OD_SPHERE: -2.25
OD_SPHERE: -2.25
OS_ADD: +2.50
OD_AXIS: 010
OD_SPHERE: -2.25
OS_AXIS: 110
OD_CYLINDER: +4.00
OD_CYLINDER: +4.00
OD_ADD: +2.50
OS_CYLINDER: +1.00

## 2025-07-08 ASSESSMENT — EXTERNAL EXAM - RIGHT EYE
OD_EXAM: NORMAL
OD_EXAM: NORMAL

## 2025-07-08 ASSESSMENT — REFRACTION_WEARINGRX
SPECS_TYPE: PAL
OD_CYLINDER: +0.50
OD_AXIS: 010
OD_SPHERE: -2.25
OD_SPHERE: -2.25
OD_AXIS: 010
OS_SPHERE: -1.00
SPECS_TYPE: PAL
OS_ADD: +2.75
OS_SPHERE: -1.00
OD_CYLINDER: +0.50
OD_ADD: +2.75
OS_AXIS: 120
OD_ADD: +2.75
OS_CYLINDER: +1.00
OS_ADD: +2.75
OS_CYLINDER: +1.00
OS_AXIS: 120

## 2025-07-08 ASSESSMENT — EXTERNAL EXAM - LEFT EYE
OS_EXAM: NORMAL
OS_EXAM: NORMAL

## 2025-07-08 ASSESSMENT — VISUAL ACUITY
CORRECTION_TYPE: GLASSES
OS_CC+: -3
CORRECTION_TYPE: GLASSES
OD_PH_CC: 20/60
OS_CC: 20/20
OS_CC: 20/20
METHOD: SNELLEN - LINEAR
OD_PH_CC: 20/60
METHOD: SNELLEN - LINEAR
OD_CC: 20/125
OS_CC+: -3
OD_CC: 20/125
OD_CC+: -1
OD_CC+: -1
OD_PH_CC+: -1

## 2025-07-08 ASSESSMENT — CUP TO DISC RATIO
OS_RATIO: 0.5
OD_RATIO: 0.55
OD_RATIO: 0.55

## 2025-07-08 ASSESSMENT — TONOMETRY
IOP_METHOD: TONOPEN
IOP_METHOD: TONOPEN
OD_IOP_MMHG: 12
OS_IOP_MMHG: 10
OS_IOP_MMHG: 10
OD_IOP_MMHG: 12

## 2025-07-08 ASSESSMENT — SLIT LAMP EXAM - LIDS
COMMENTS: SUBTLE PTOSIS
COMMENTS: NORMAL
COMMENTS: SUBTLE PTOSIS
COMMENTS: NORMAL

## 2025-07-08 NOTE — PATIENT INSTRUCTIONS
Continue Oral Methotrexate 25 mg (10 pills) weekly, Folic acid 1 mg daily  No oral steroid needed  For the right eye, we might consider a slight modification with Dr. Lara perhaps reducing one of the medications. For now, let's plan to continue: Prednisolone 2x/day, Dorzolamide 2x/day right eye, Combigan 2x/day, Latanoprost at bedtime, atropine as needed, no ketorolac and no moxifloxacin, erythromycin at times  For the left eye: Prednisolone once a day, Dorzolamide once a day, Combigan once a day, Latanoprost at bedtime

## 2025-07-08 NOTE — LETTER
7/8/2025       RE: Debi Reed  [id number]  [facility] [id number]     Dear Colleague,    Thank you for referring your patient, Debi Reed, to the [facility] [place] at [facility]. Please see a copy of my visit note below.    Chief Complaint/Presenting Concern:  Uveitis follow up    Interval History of Present Ocular Illness:  Debi Reed is a 54 year old patient who returns for follow up of her uveitis. Last visit things were healing up after lens replacement surgery right eye. Debi saw Dr. Lara recently and contact lens was removed.    Debi notes the right eye is feeling better although vision still blurry. Drops are uncomfortable. Debi notices some waviness right eye at times. Left eye seems fine.    Interval Updates to Medical/Family/Social History:    Recently started Lipitor for cholesterol  Started Buspar and now ativan is only as needed.    Relevant Review of Systems Updates:  Anxiety has been okay.     Laboratory Testing: June 2025: normal CBC, creatinine, AST, ALT     Current eye related medications:   Drops right eye:Prednisolone 2x/day, Dorzolamide 2x/day right eye, Combigan 2x/day, Latanoprost at bedtime, atropine as needed, no ketorolac and no moxifloxacin, erythromycin at times  Drop left eye: Prednisolone once a day, Dorzolamide once a day, Combigan once a day, Latanoprost at bedtime  Pills: Oral Methotrexate 25 mg (10 pills) weekly, Folic acid 1 mg daily, no oral steroid    Retina/Uveitis Imaging:   OCT Spectralis Macula July 8, 2025  right eye: Mild hazy view, myopic contour without fluid  left eye: Clear, myopic, no fluid    OCT Optic Nerve RNFL Spectralis July 8, 2025  right eye: Avg thickness 77 microns, stable thin ST and IN, borderline SN and IT  left eye: Avg thickness 65 microns, stable thin sup, thin temporal (some focal variation)    Assessment:     1. Lens-induced iridocyclitis,  right eye (Primary)  Still some ongoing inflammation but no retinal edema on scan    2. Acute iritis of left eye  Few cells     3. Steroid responder, bilateral  IOP 12,10    4. High risk medication use  Oral methotrexate 25 mg weekly    Plan/Recommendations:    Discussed findings with patient and her Friend.  The right eye is healing well. There is some blurriness which persists likely from cornea and some inflammation. There is no macular edema and retina is attached. We anticipate things will improve with more time and may hold off on updating glasses for now but Dr. Lara will decide  The left eye is doing well with a few cells but no macular edema and no detachment. We can keep the drops the same left eye   Eye pressure is 12,10. Dr. Lara   Continue Oral Methotrexate 25 mg (10 pills) weekly, Folic acid 1 mg daily  No oral steroid needed  For the right eye, we might consider a slight modification with Dr. Lara perhaps reducing one of the medications. For now, let's plan to continue: Prednisolone 2x/day, Dorzolamide 2x/day right eye, Combigan 2x/day, Latanoprost at bedtime, atropine as needed, no ketorolac and no moxifloxacin, erythromycin at times  For the left eye: Prednisolone once a day, Dorzolamide once a day, Combigan once a day, Latanoprost at bedtime    RTC Dr. Lara today and then next visit 3-4 weeks with JY applanate, no dilation, no testing    Physician Attestation    Attending Physician Attestation:  Complete documentation of historical and exam elements from today's encounter can be found in the full encounter summary report (not reduplicated in this progress note). I personally obtained the chief complaint(s) and history of present illness. I confirmed and edited as necessary the review of systems, past medical/surgical history, family history, social history, and examination findings as documented by others; and I examined the patient myself. I personally reviewed the relevant tests, images, and  reports as documented above. I formulated and edited as necessary the assessment and plan and discussed the findings and management plan with the patient and family members present at the time of this visit.  Dheeraj Rich M.D., Uveitis and Medical Retina, July 8, 2025       Again, thank you for allowing me to participate in the care of your patient.      Sincerely,    Dheeraj Rich MD  Uveitis and Medical Retina    Department of Ophthalmology & Visual Neurosciences  [facility]

## 2025-07-08 NOTE — PROGRESS NOTES
HPI       Post Op (Ophthalmology) Right Eye    In right eye. Additional comments: s/p Explantation of dislocated intraocular lens implant, removal of cortical remnants, synechialysis, pars plana anterior vitrectomy and insertion of secondary scleral fixated intraocular lens, right eye 6/9/25             Comments    Pt states vision is about the same as last week, blurred. Pt having discomfort and achy feeling in RE that comes and goes, better with tylenol use.   New floater in RE yesterday with a few random flashes. No new redness or dryness.  Medications:   Oral Methotrexate 25 mg (10 pills) weekly, Folic acid 1 mg daily    Drops: Prednisolone BID right eye and once daily left eye, dorzolamide 2 times a day right eye and once daily left eye, Combigan 2 times a day right eye once daily left eye, Latanoprost BE at bedtime, atropine PRN (last dose 2 days ago RE) artificial tears BE    FEMI Peña July 8, 2025 9:15 AM              Last edited by Vidhya Cabrera COMT on 7/8/2025  9:19 AM.          Review of systems for the eyes was negative other than the pertinent positives/negatives listed in the HPI.      Assessment & Plan      Debi Reed is a 54 year old female with the following diagnoses:   1. Post-operative state    2. History of secondary intraocular lens implant    3. Lens-induced iridocyclitis, right eye    4. Ocular hypertension, right eye        s/p Explantation of dislocated intraocular lens implant, removal of cortical remnants, synechialysis, pars plana anterior vitrectomy and insertion of secondary scleral fixated intraocular lens, right eye 6/9/25    Improved photophobia and foreign body sensation   Intraocular pressure 12 mm Hg today  Healing well  Keratitis improving     RIGHT EYE:      Continue Prednisolone 2x/day   Continue atropine daily as needed   Erythromycin ointment three times a day   Preservative free artificial tears every 30-60 min as needed for irritation  Continue  Latanoprost at bedtime   Continue Combigan twice a day   Stop Dorzolamide         LEFT EYE:      Latanoprost nightly   Prednisolone 1x/day   Combigan 1x/day     Patient disposition:   Return in about 4 weeks (around 8/5/2025) for Refraction, VT only.           Attending Physician Attestation:  Complete documentation of historical and exam elements from today's encounter can be found in the full encounter summary report (not reduplicated in this progress note).  I personally obtained the chief complaint(s) and history of present illness.  I confirmed and edited as necessary the review of systems, past medical/surgical history, family history, social history, and examination findings as documented by others; and I examined the patient myself.  I personally reviewed the relevant tests, images, and reports as documented above.  I formulated and edited as necessary the assessment and plan and discussed the findings and management plan with the patient and family. . - Stephen Lara MD

## 2025-07-08 NOTE — PROGRESS NOTES
Chief Complaint/Presenting Concern:  Uveitis follow up    Interval History of Present Ocular Illness:  Debi Reed is a 54 year old patient who returns for follow up of her uveitis. Last visit things were healing up after lens replacement surgery right eye. Debi saw Dr. Lara recently and contact lens was removed.    Debi notes the right eye is feeling better although vision still blurry. Drops are uncomfortable. Debi notices some waviness right eye at times. Left eye seems fine.    Interval Updates to Medical/Family/Social History:    Recently started Lipitor for cholesterol  Started Buspar and now ativan is only as needed.    Relevant Review of Systems Updates:  Anxiety has been okay.     Laboratory Testing: June 2025: normal CBC, creatinine, AST, ALT     Current eye related medications:   Drops right eye:Prednisolone 2x/day, Dorzolamide 2x/day right eye, Combigan 2x/day, Latanoprost at bedtime, atropine as needed, no ketorolac and no moxifloxacin, erythromycin at times  Drop left eye: Prednisolone once a day, Dorzolamide once a day, Combigan once a day, Latanoprost at bedtime  Pills: Oral Methotrexate 25 mg (10 pills) weekly, Folic acid 1 mg daily, no oral steroid    Retina/Uveitis Imaging:   OCT Spectralis Macula July 8, 2025  right eye: Mild hazy view, myopic contour without fluid  left eye: Clear, myopic, no fluid    OCT Optic Nerve RNFL Spectralis July 8, 2025  right eye: Avg thickness 77 microns, stable thin ST and IN, borderline SN and IT  left eye: Avg thickness 65 microns, stable thin sup, thin temporal (some focal variation)    Assessment:     1. Lens-induced iridocyclitis, right eye (Primary)  Still some ongoing inflammation but no retinal edema on scan    2. Acute iritis of left eye  Few cells     3. Steroid responder, bilateral  IOP 12,10    4. High risk medication use  Oral methotrexate 25 mg weekly    Plan/Recommendations:    Discussed findings with patient and her Friend.  The  right eye is healing well. There is some blurriness which persists likely from cornea and some inflammation. There is no macular edema and retina is attached. We anticipate things will improve with more time and may hold off on updating glasses for now but Dr. Lara will decide  The left eye is doing well with a few cells but no macular edema and no detachment. We can keep the drops the same left eye   Eye pressure is 12,10. Dr. Lara   Continue Oral Methotrexate 25 mg (10 pills) weekly, Folic acid 1 mg daily  No oral steroid needed  For the right eye, we might consider a slight modification with Dr. Lara perhaps reducing one of the medications.Updated plan per DR. Lara: Prednisolone 2x/day,  Combigan 2x/day, Latanoprost at bedtime, atropine as needed, erythromycin at times. Stop dorzolamide  For the left eye: Prednisolone once a day, Combigan once a day, Latanoprost at bedtime. Stop dorzolamide in this eye as well.    RTC Dr. Lara today and then next visit 3-4 weeks with JY applanate, no dilation, no testing? Refract after     Physician Attestation     Attending Physician Attestation:  Complete documentation of historical and exam elements from today's encounter can be found in the full encounter summary report (not reduplicated in this progress note). I personally obtained the chief complaint(s) and history of present illness. I confirmed and edited as necessary the review of systems, past medical/surgical history, family history, social history, and examination findings as documented by others; and I examined the patient myself. I personally reviewed the relevant tests, images, and reports as documented above. I formulated and edited as necessary the assessment and plan and discussed the findings and management plan with the patient and family members present at the time of this visit.  Dheeraj Rich M.D., Uveitis and Medical Retina, July 8, 2025

## 2025-07-29 ENCOUNTER — OFFICE VISIT (OUTPATIENT)
Dept: OPHTHALMOLOGY | Facility: CLINIC | Age: 55
End: 2025-07-29
Attending: OPHTHALMOLOGY
Payer: COMMERCIAL

## 2025-07-29 DIAGNOSIS — Z96.1 PSEUDOPHAKIA, BOTH EYES: ICD-10-CM

## 2025-07-29 DIAGNOSIS — Z98.890 POST-OPERATIVE STATE: Primary | ICD-10-CM

## 2025-07-29 DIAGNOSIS — H20.21 LENS-INDUCED IRIDOCYCLITIS, RIGHT EYE: Primary | ICD-10-CM

## 2025-07-29 DIAGNOSIS — H40.043 STEROID RESPONDER, BILATERAL: ICD-10-CM

## 2025-07-29 DIAGNOSIS — H20.00 ACUTE IRITIS OF LEFT EYE: ICD-10-CM

## 2025-07-29 DIAGNOSIS — Z79.899 HIGH RISK MEDICATION USE: ICD-10-CM

## 2025-07-29 DIAGNOSIS — H20.021 RECURRENT IRITIS OF RIGHT EYE: ICD-10-CM

## 2025-07-29 PROCEDURE — 99213 OFFICE O/P EST LOW 20 MIN: CPT | Performed by: OPHTHALMOLOGY

## 2025-07-29 PROCEDURE — 99211 OFF/OP EST MAY X REQ PHY/QHP: CPT | Performed by: OPHTHALMOLOGY

## 2025-07-29 PROCEDURE — 99024 POSTOP FOLLOW-UP VISIT: CPT | Performed by: OPHTHALMOLOGY

## 2025-07-29 RX ORDER — OMEPRAZOLE 40 MG/1
40 CAPSULE, DELAYED RELEASE ORAL
COMMUNITY

## 2025-07-29 ASSESSMENT — REFRACTION_MANIFEST
OD_SPHERE: -2.50
OD_AXIS: 010
OS_CYLINDER: +1.00
OD_ADD: +2.50
OS_ADD: +2.50
OS_AXIS: 110
OS_SPHERE: -1.25
OD_CYLINDER: +0.75

## 2025-07-29 ASSESSMENT — CONF VISUAL FIELD
OS_NORMAL: 1
OD_NORMAL: 1
OD_SUPERIOR_TEMPORAL_RESTRICTION: 0
OD_SUPERIOR_NASAL_RESTRICTION: 0
OS_INFERIOR_NASAL_RESTRICTION: 0
OD_INFERIOR_NASAL_RESTRICTION: 0
OD_INFERIOR_TEMPORAL_RESTRICTION: 0
OS_SUPERIOR_TEMPORAL_RESTRICTION: 0
OS_SUPERIOR_TEMPORAL_RESTRICTION: 0
OS_INFERIOR_NASAL_RESTRICTION: 0
OD_SUPERIOR_NASAL_RESTRICTION: 0
OS_INFERIOR_TEMPORAL_RESTRICTION: 0
OD_INFERIOR_NASAL_RESTRICTION: 0
OS_INFERIOR_TEMPORAL_RESTRICTION: 0
OS_NORMAL: 1
OS_SUPERIOR_NASAL_RESTRICTION: 0
METHOD: COUNTING FINGERS
OD_NORMAL: 1
METHOD: COUNTING FINGERS
OD_INFERIOR_TEMPORAL_RESTRICTION: 0
OS_SUPERIOR_NASAL_RESTRICTION: 0
OD_SUPERIOR_TEMPORAL_RESTRICTION: 0

## 2025-07-29 ASSESSMENT — REFRACTION_WEARINGRX
OD_CYLINDER: +0.50
OD_CYLINDER: +0.50
SPECS_TYPE: PAL
OS_CYLINDER: +1.00
SPECS_TYPE: PAL
OS_AXIS: 120
OD_SPHERE: -2.25
OD_SPHERE: -2.25
OS_SPHERE: -1.00
OD_AXIS: 010
OS_ADD: +2.75
OD_ADD: +2.75
OD_AXIS: 010
OD_ADD: +2.75
OS_AXIS: 120
OS_CYLINDER: +1.00
OS_SPHERE: -1.00
OS_ADD: +2.75

## 2025-07-29 ASSESSMENT — VISUAL ACUITY
OD_CC: 20/50
OD_PH_CC: 20/40
OS_CC: 20/20
METHOD: SNELLEN - LINEAR
OS_CC+: -2
OS_CC+: -2
METHOD: SNELLEN - LINEAR
OD_CC: 20/50
OS_CC: 20/20
CORRECTION_TYPE: GLASSES
CORRECTION_TYPE: GLASSES

## 2025-07-29 ASSESSMENT — CUP TO DISC RATIO
OD_RATIO: 0.55
OS_RATIO: 0.5

## 2025-07-29 ASSESSMENT — TONOMETRY
OS_IOP_MMHG: 19
IOP_METHOD: APP BY JY
OD_IOP_MMHG: 24
IOP_METHOD: APPLANATION
OS_IOP_MMHG: 19
IOP_METHOD: APPLANATION
OD_IOP_MMHG: 33
OD_IOP_MMHG: 33

## 2025-07-29 ASSESSMENT — SLIT LAMP EXAM - LIDS
COMMENTS: NORMAL
COMMENTS: NORMAL
COMMENTS: SUBTLE PTOSIS
COMMENTS: SUBTLE PTOSIS

## 2025-07-29 ASSESSMENT — EXTERNAL EXAM - RIGHT EYE
OD_EXAM: NORMAL
OD_EXAM: NORMAL

## 2025-07-29 ASSESSMENT — EXTERNAL EXAM - LEFT EYE
OS_EXAM: NORMAL
OS_EXAM: NORMAL

## 2025-07-29 NOTE — PROGRESS NOTES
HPI       Post Op (Ophthalmology) Right Eye    In right eye.  This started months ago.  Quality: Va is a little curved and points of va that are not seeing well.  Feels since the flare it is more noticeable .  Associated symptoms include dryness, redness, tearing, photophobia, flashes and floaters.             Comments    s/p Explantation of dislocated intraocular lens implant, removal of cortical remnants, synechialysis, pars plana anterior vitrectomy and insertion of secondary scleral fixated intraocular lens, right eye 6/9/25  Feels there was a flare last week.  Prednisolone 2x/day right eye and every day left eye   Combigan 2x/day right eye and every day left eye   Latanoprost at bedtime each eye   Atropine last used close to 2 weeks ago  Erythromycin not needed lately   Methotrexate   Folic acid  Ni Ojeda COT 8:27 AM July 29, 2025            Last edited by Ni Ojeda on 7/29/2025  8:34 AM.          Review of systems for the eyes was negative other than the pertinent positives/negatives listed in the HPI.      Assessment & Plan      Debi Reed is a 54 year old female with the following diagnoses:   1. Post-operative state    2. Pseudophakia, both eyes    3. Steroid responder, bilateral    4. Recurrent iritis of right eye         s/p Explantation of dislocated intraocular lens implant, removal of cortical remnants, synechialysis, pars plana anterior vitrectomy and insertion of secondary scleral fixated intraocular lens, right eye 6/9/25     Some worsened discomfort last week.  Vision is about the same  Intraocular pressure higher with increased iritis and cessation of dorz  Healing well  Keratitis resolved     RIGHT EYE:      Resume Dorzolamide twice a day   Continue Latanoprost at bedtime   Continue Combigan twice a day     Continue Prednisolone 2x/day per JY  Erythromycin ointment as needed   Preservative free artificial tears every 30-60 min as needed for irritation       LEFT EYE:       Latanoprost nightly   Prednisolone 1x/day   Combigan 1x/day      Return precautions reviewed     Patient disposition:   Return in about 4 weeks (around 8/26/2025) for VT only, Refraction, OCT NFL.           Attending Physician Attestation:  Complete documentation of historical and exam elements from today's encounter can be found in the full encounter summary report (not reduplicated in this progress note).  I personally obtained the chief complaint(s) and history of present illness.  I confirmed and edited as necessary the review of systems, past medical/surgical history, family history, social history, and examination findings as documented by others; and I examined the patient myself.  I personally reviewed the relevant tests, images, and reports as documented above.  I formulated and edited as necessary the assessment and plan and discussed the findings and management plan with the patient and family. . - Stephen Lara MD

## 2025-07-29 NOTE — PATIENT INSTRUCTIONS
Here's the plan for the drops right eye:Prednisolone keep 2x/day, Combigan 2x/day, Latanoprost at bedtime. Let's restart Dorzolamide 2x/day. Okay to use erythromycin   Here's the plan for drop left eye: Prednisolone once a day, Combigan once a day, Latanoprost at bedtime, no dorzolamide needed unless per Dr. Lara  Continue the same pills: Oral Methotrexate 25 mg (10 pills) weekly, Folic acid 1 mg daily  No new medications  Take care of yourself!

## 2025-07-29 NOTE — PROGRESS NOTES
Chief Complaint/Presenting Concern:  Uveitis follow up    Interval History of Present Ocular Illness:  Debi Reed is a 54 year old patient who returns for follow up of her recurrent iritis. Last visit things were healing and pressure was doing well and we elected to modify drops and Dr. Lara stopped Dorzolamide    Debi notes the right eye has been a bit sore and a possible flare in the right eye happened last week. There were some spots in the vision as well. Left eye still doing okay.     Interval Updates to Medical/Family/Social History:    Taking prilosec now 40 mg twice daily for gastric ulcer and then will have another EGD.     Relevant Review of Systems Updates:  As above     Current eye related medications:   Drops right eye:Prednisolone 2x/day, Combigan 2x/day, Latanoprost at bedtime, atropine last two weeks ago, erythromycin at times, no dorzolamide  Drop left eye: Prednisolone once a day, Combigan once a day, Latanoprost at bedtime, no dorzolamide  Pills: Oral Methotrexate 25 mg (10 pills) weekly, Folic acid 1 mg daily    No Imaging today    Assessment:     1. Lens-induced iridocyclitis, right eye (Primary)  Some increase today    2. Acute iritis of left eye  Minimal    3. Steroid responder, bilateral  IOP 33, 19    4. High risk medication use  Oral methotrexate 25 mg weekly     Plan/Recommendations:    Discussed findings with patient and her Friend. The right eye has improved surface changes but slightly more inflammation. There may have been a flare which occurred.We should continue the same frequency of steroid drops without modification  Eye pressure was 33 This is likely related to reduction in Dorzolamide as we planned although improved to 24 after one drop of Dorzolamide..We will restart Dorzolamide and defer to Dr. Lara  The left eye has only a few cells and IOP 19. No changes needed left eye   Recommend additional testing: None needed  Here's the plan for the drops right  eye:Prednisolone keep 2x/day, Combigan 2x/day, Latanoprost at bedtime. Let's restart Dorzolamide 2x/day. Okay to use erythromycin   Here's the plan for drop left eye: Prednisolone once a day, Combigan once a day, Latanoprost at bedtime, no dorzolamide needed unless per Dr. Lara  Continue the same pills: Oral Methotrexate 25 mg (10 pills) weekly, Folic acid 1 mg daily  No new medications    RTC Dr. Lara today. JY in 4 weeks applanate, no dilation, no testing    Physician Attestation     Attending Physician Attestation:  Complete documentation of historical and exam elements from today's encounter can be found in the full encounter summary report (not reduplicated in this progress note). I personally obtained the chief complaint(s) and history of present illness. I confirmed and edited as necessary the review of systems, past medical/surgical history, family history, social history, and examination findings as documented by others; and I examined the patient myself. I personally reviewed the relevant tests, images, and reports as documented above. I formulated and edited as necessary the assessment and plan and discussed the findings and management plan with the patient and family members present at the time of this visit.  Dheeraj Rich M.D., Uveitis and Medical Retina, July 29, 2025

## 2025-07-29 NOTE — LETTER
7/29/2025       RE: Debi Reed  [id number]  [facility] [id number]     Dear Colleague,    Thank you for referring your patient, Debi Reed, to the [facility] [place] at [facility]. Please see a copy of my visit note below.    Chief Complaint/Presenting Concern:  Uveitis follow up    Interval History of Present Ocular Illness:  Debi Reed is a 54 year old patient who returns for follow up of her recurrent iritis. Last visit things were healing and pressure was doing well and we elected to modify drops and Dr. Lara stopped Dorzolamide    Debi notes the right eye has been a bit sore and a possible flare in the right eye happened last week. There were some spots in the vision as well. Left eye still doing okay.     Interval Updates to Medical/Family/Social History:    Taking prilosec now 40 mg twice daily for gastric ulcer and then will have another EGD.     Relevant Review of Systems Updates:  As above     Current eye related medications:   Drops right eye:Prednisolone 2x/day, Combigan 2x/day, Latanoprost at bedtime, atropine last two weeks ago, erythromycin at times, no dorzolamide  Drop left eye: Prednisolone once a day, Combigan once a day, Latanoprost at bedtime, no dorzolamide  Pills: Oral Methotrexate 25 mg (10 pills) weekly, Folic acid 1 mg daily    No Imaging today    Assessment:     1. Lens-induced iridocyclitis, right eye (Primary)  Some increase today    2. Acute iritis of left eye  Minimal    3. Steroid responder, bilateral  IOP 33, 19    4. High risk medication use  Oral methotrexate 25 mg weekly     Plan/Recommendations:    Discussed findings with patient and her friend. The right eye has improved surface changes but slightly more inflammation. There may have been a flare which occurred.We should continue the same frequency of steroid drops without modification  Eye pressure was 33 This is likely related  to reduction in Dorzolamide as we planned although improved to 24 after one drop of Dorzolamide..We will restart Dorzolamide and defer to Dr. Lara  The left eye has only a few cells and IOP 19. No changes needed left eye   Recommend additional testing: None needed  Here's the plan for the drops right eye:Prednisolone keep 2x/day, Combigan 2x/day, Latanoprost at bedtime. Let's restart Dorzolamide 2x/day. Okay to use erythromycin   Here's the plan for drop left eye: Prednisolone once a day, Combigan once a day, Latanoprost at bedtime, no dorzolamide needed unless per Dr. Lara  Continue the same pills: Oral Methotrexate 25 mg (10 pills) weekly, Folic acid 1 mg daily  No new medications    RTC Dr. Lara today. JY in 4 weeks applanate, no dilation, no testing    Physician Attestation    Attending Physician Attestation:  Complete documentation of historical and exam elements from today's encounter can be found in the full encounter summary report (not reduplicated in this progress note). I personally obtained the chief complaint(s) and history of present illness. I confirmed and edited as necessary the review of systems, past medical/surgical history, family history, social history, and examination findings as documented by others; and I examined the patient myself. I personally reviewed the relevant tests, images, and reports as documented above. I formulated and edited as necessary the assessment and plan and discussed the findings and management plan with the patient and family members present at the time of this visit.  Dheeraj Rich M.D., Uveitis and Medical Retina, July 29, 2025     Again, thank you for allowing me to participate in the care of your patient.      Sincerely,    Dheeraj Rich MD  Uveitis and Medical Retina    Department of Ophthalmology & Visual Neurosciences  [facility]

## 2025-08-07 ENCOUNTER — MYC MEDICAL ADVICE (OUTPATIENT)
Dept: OPHTHALMOLOGY | Facility: CLINIC | Age: 55
End: 2025-08-07
Payer: COMMERCIAL

## 2025-08-11 ENCOUNTER — OFFICE VISIT (OUTPATIENT)
Dept: OPHTHALMOLOGY | Facility: CLINIC | Age: 55
End: 2025-08-11
Attending: OPHTHALMOLOGY
Payer: COMMERCIAL

## 2025-08-11 DIAGNOSIS — H20.00 ACUTE IRITIS OF LEFT EYE: ICD-10-CM

## 2025-08-11 DIAGNOSIS — H20.21 LENS-INDUCED IRIDOCYCLITIS, RIGHT EYE: Primary | ICD-10-CM

## 2025-08-11 DIAGNOSIS — Z79.899 HIGH RISK MEDICATION USE: ICD-10-CM

## 2025-08-11 DIAGNOSIS — H40.043 STEROID RESPONDER, BILATERAL: ICD-10-CM

## 2025-08-11 PROCEDURE — 99213 OFFICE O/P EST LOW 20 MIN: CPT | Mod: 24 | Performed by: OPHTHALMOLOGY

## 2025-08-11 PROCEDURE — 99213 OFFICE O/P EST LOW 20 MIN: CPT | Performed by: OPHTHALMOLOGY

## 2025-08-11 ASSESSMENT — TONOMETRY
IOP_METHOD: APPLANATION
IOP_METHOD: APPLANATION
OS_IOP_MMHG: 16
OD_IOP_MMHG: 25
OD_IOP_MMHG: 29
OD_IOP_MMHG: 30
IOP_METHOD: APP BY JY

## 2025-08-11 ASSESSMENT — CUP TO DISC RATIO
OD_RATIO: 0.55
OS_RATIO: 0.5

## 2025-08-11 ASSESSMENT — REFRACTION_WEARINGRX
OD_ADD: +2.75
OD_SPHERE: -2.25
OS_ADD: +2.75
SPECS_TYPE: PAL
OS_CYLINDER: +1.00
OD_CYLINDER: +0.50
OS_SPHERE: -1.00
OD_AXIS: 010
OS_AXIS: 120

## 2025-08-11 ASSESSMENT — EXTERNAL EXAM - LEFT EYE: OS_EXAM: NORMAL

## 2025-08-11 ASSESSMENT — VISUAL ACUITY
CORRECTION_TYPE: GLASSES
OD_PH_CC: 20/60
METHOD: SNELLEN - LINEAR
OD_CC: 20/100
OS_CC: 20/25

## 2025-08-11 ASSESSMENT — SLIT LAMP EXAM - LIDS
COMMENTS: SUBTLE PTOSIS
COMMENTS: NORMAL

## 2025-08-11 ASSESSMENT — EXTERNAL EXAM - RIGHT EYE: OD_EXAM: NORMAL

## 2025-08-15 ENCOUNTER — MYC MEDICAL ADVICE (OUTPATIENT)
Dept: OPHTHALMOLOGY | Facility: CLINIC | Age: 55
End: 2025-08-15
Payer: COMMERCIAL

## 2025-08-15 DIAGNOSIS — H20.21 LENS-INDUCED IRIDOCYCLITIS, RIGHT EYE: Primary | ICD-10-CM

## 2025-08-15 DIAGNOSIS — H40.051 OCULAR HYPERTENSION, RIGHT EYE: ICD-10-CM

## 2025-08-15 RX ORDER — METHYLPREDNISOLONE 4 MG/1
TABLET ORAL
Qty: 21 TABLET | Refills: 1 | Status: SHIPPED | OUTPATIENT
Start: 2025-08-15

## 2025-08-15 RX ORDER — BRIMONIDINE TARTRATE AND TIMOLOL MALEATE 2; 5 MG/ML; MG/ML
1 SOLUTION OPHTHALMIC 2 TIMES DAILY
Qty: 15 ML | Refills: 3 | Status: SHIPPED | OUTPATIENT
Start: 2025-08-15

## 2025-08-19 ENCOUNTER — OFFICE VISIT (OUTPATIENT)
Dept: OPHTHALMOLOGY | Facility: CLINIC | Age: 55
End: 2025-08-19
Payer: COMMERCIAL

## 2025-08-19 DIAGNOSIS — H40.043 STEROID RESPONDER, BILATERAL: ICD-10-CM

## 2025-08-19 DIAGNOSIS — H20.9 UVEITIS-HYPHEMA-GLAUCOMA SYNDROME OF RIGHT EYE: Primary | ICD-10-CM

## 2025-08-19 DIAGNOSIS — T85.398A UVEITIS-HYPHEMA-GLAUCOMA SYNDROME OF RIGHT EYE: Primary | ICD-10-CM

## 2025-08-19 DIAGNOSIS — H40.41X0 UVEITIS-HYPHEMA-GLAUCOMA SYNDROME OF RIGHT EYE: Primary | ICD-10-CM

## 2025-08-19 PROCEDURE — 99213 OFFICE O/P EST LOW 20 MIN: CPT

## 2025-08-19 PROCEDURE — 92134 CPTRZ OPH DX IMG PST SGM RTA: CPT

## 2025-08-19 PROCEDURE — 99024 POSTOP FOLLOW-UP VISIT: CPT | Mod: GC | Performed by: OPHTHALMOLOGY

## 2025-08-19 PROCEDURE — 92133 CPTRZD OPH DX IMG PST SGM ON: CPT

## 2025-08-19 PROCEDURE — 76513 OPH US DX ANT SGM US UNI/BI: CPT

## 2025-08-19 RX ORDER — LATANOPROST 50 UG/ML
1 SOLUTION/ DROPS OPHTHALMIC AT BEDTIME
Qty: 2.5 ML | Refills: 3 | Status: SHIPPED | OUTPATIENT
Start: 2025-08-19

## 2025-08-19 ASSESSMENT — TONOMETRY
IOP_METHOD: APPLANATION
OS_IOP_MMHG: 18
OD_IOP_MMHG: 30
OD_IOP_MMHG: 35
IOP_METHOD: APPLANATION
OS_IOP_MMHG: 20

## 2025-08-19 ASSESSMENT — SLIT LAMP EXAM - LIDS
COMMENTS: NORMAL
COMMENTS: SUBTLE PTOSIS

## 2025-08-19 ASSESSMENT — VISUAL ACUITY
CORRECTION_TYPE: GLASSES
METHOD: SNELLEN - LINEAR
OS_CC: 20/20
OD_CC: 20/100
OD_PH_CC: 20/70

## 2025-08-19 ASSESSMENT — EXTERNAL EXAM - LEFT EYE: OS_EXAM: NORMAL

## 2025-08-19 ASSESSMENT — EXTERNAL EXAM - RIGHT EYE: OD_EXAM: NORMAL

## 2025-08-19 ASSESSMENT — CUP TO DISC RATIO
OS_RATIO: 0.5
OD_RATIO: 0.55

## 2025-08-26 ENCOUNTER — OFFICE VISIT (OUTPATIENT)
Dept: OPHTHALMOLOGY | Facility: CLINIC | Age: 55
End: 2025-08-26
Attending: OPHTHALMOLOGY
Payer: COMMERCIAL

## 2025-08-26 DIAGNOSIS — Z79.899 HIGH RISK MEDICATION USE: ICD-10-CM

## 2025-08-26 DIAGNOSIS — Z96.1 PSEUDOPHAKIA, BOTH EYES: ICD-10-CM

## 2025-08-26 DIAGNOSIS — H40.043 STEROID RESPONDER, BILATERAL: ICD-10-CM

## 2025-08-26 DIAGNOSIS — H20.00 ACUTE IRITIS OF LEFT EYE: ICD-10-CM

## 2025-08-26 DIAGNOSIS — H40.41X0 UVEITIS-HYPHEMA-GLAUCOMA SYNDROME OF RIGHT EYE: Primary | ICD-10-CM

## 2025-08-26 DIAGNOSIS — Z96.1 HISTORY OF SECONDARY INTRAOCULAR LENS IMPLANT: ICD-10-CM

## 2025-08-26 DIAGNOSIS — H20.21 LENS-INDUCED IRIDOCYCLITIS, RIGHT EYE: Primary | ICD-10-CM

## 2025-08-26 DIAGNOSIS — T85.398A UVEITIS-HYPHEMA-GLAUCOMA SYNDROME OF RIGHT EYE: Primary | ICD-10-CM

## 2025-08-26 DIAGNOSIS — Z98.890 HISTORY OF VITRECTOMY: ICD-10-CM

## 2025-08-26 DIAGNOSIS — H20.9 UVEITIS-HYPHEMA-GLAUCOMA SYNDROME OF RIGHT EYE: Primary | ICD-10-CM

## 2025-08-26 PROCEDURE — 99211 OFF/OP EST MAY X REQ PHY/QHP: CPT | Performed by: OPHTHALMOLOGY

## 2025-08-26 PROCEDURE — 99213 OFFICE O/P EST LOW 20 MIN: CPT | Performed by: OPHTHALMOLOGY

## 2025-08-26 PROCEDURE — 99024 POSTOP FOLLOW-UP VISIT: CPT | Performed by: OPHTHALMOLOGY

## 2025-08-26 ASSESSMENT — SLIT LAMP EXAM - LIDS
COMMENTS: SUBTLE PTOSIS
COMMENTS: SUBTLE PTOSIS
COMMENTS: NORMAL
COMMENTS: NORMAL

## 2025-08-26 ASSESSMENT — TONOMETRY
IOP_METHOD: APPLANATION
IOP_METHOD: APPLANATION
OS_IOP_MMHG: 16
OD_IOP_MMHG: 20
OS_IOP_MMHG: 16
OD_IOP_MMHG: 20

## 2025-08-26 ASSESSMENT — VISUAL ACUITY
OD_PH_CC: 20/50
OD_CC: 20/70
OD_PH_CC: 20/50
METHOD: SNELLEN - LINEAR
OS_CC: 20/20
METHOD: SNELLEN - LINEAR
OD_PH_CC+: -2
OS_CC: 20/20
OD_PH_CC+: -2
OD_CC: 20/70
CORRECTION_TYPE: GLASSES

## 2025-08-26 ASSESSMENT — EXTERNAL EXAM - RIGHT EYE
OD_EXAM: NORMAL
OD_EXAM: NORMAL

## 2025-08-26 ASSESSMENT — EXTERNAL EXAM - LEFT EYE
OS_EXAM: NORMAL
OS_EXAM: NORMAL

## 2025-08-26 ASSESSMENT — CUP TO DISC RATIO
OD_RATIO: 0.55
OD_RATIO: 0.55
OS_RATIO: 0.5

## 2025-08-27 RX ORDER — DORZOLAMIDE HYDROCHLORIDE AND TIMOLOL MALEATE PRESERVATIVE FREE 20; 5 MG/ML; MG/ML
SOLUTION/ DROPS OPHTHALMIC
Qty: 60 EACH | Refills: 5 | Status: SHIPPED | OUTPATIENT
Start: 2025-08-27

## 2025-08-27 RX ORDER — BRIMONIDINE TARTRATE 1 MG/ML
SOLUTION/ DROPS OPHTHALMIC
Qty: 15 ML | Refills: 11 | Status: SHIPPED | OUTPATIENT
Start: 2025-08-27

## 2025-09-02 ENCOUNTER — OFFICE VISIT (OUTPATIENT)
Dept: OPHTHALMOLOGY | Facility: CLINIC | Age: 55
End: 2025-09-02
Attending: OPHTHALMOLOGY
Payer: COMMERCIAL

## 2025-09-02 DIAGNOSIS — H20.00 ACUTE IRITIS OF LEFT EYE: ICD-10-CM

## 2025-09-02 DIAGNOSIS — H20.21 LENS-INDUCED IRIDOCYCLITIS, RIGHT EYE: Primary | ICD-10-CM

## 2025-09-02 DIAGNOSIS — Z79.899 HIGH RISK MEDICATION USE: ICD-10-CM

## 2025-09-02 DIAGNOSIS — H40.043 STEROID RESPONDER, BILATERAL: ICD-10-CM

## 2025-09-02 PROCEDURE — 99213 OFFICE O/P EST LOW 20 MIN: CPT | Performed by: OPHTHALMOLOGY

## 2025-09-02 RX ORDER — ATROPINE SULFATE 10 MG/ML
1-2 SOLUTION/ DROPS OPHTHALMIC 2 TIMES DAILY
Qty: 10 ML | Refills: 4 | Status: SHIPPED | OUTPATIENT
Start: 2025-09-02

## 2025-09-02 ASSESSMENT — REFRACTION_WEARINGRX
SPECS_TYPE: PAL
OS_AXIS: 120
OS_ADD: +2.75
OD_CYLINDER: +0.50
OD_ADD: +2.75
OS_SPHERE: -1.00
OD_AXIS: 010
OS_CYLINDER: +1.00
OD_SPHERE: -2.25

## 2025-09-02 ASSESSMENT — VISUAL ACUITY
METHOD: SNELLEN - LINEAR
OD_PH_CC: 20/40
OS_CC+: -3
OD_CC: 20/50
CORRECTION_TYPE: GLASSES
OD_PH_CC+: -1
OS_CC: 20/25

## 2025-09-02 ASSESSMENT — CONF VISUAL FIELD
OS_NORMAL: 1
OD_SUPERIOR_NASAL_RESTRICTION: 0
OS_SUPERIOR_NASAL_RESTRICTION: 0
OD_SUPERIOR_TEMPORAL_RESTRICTION: 0
OS_INFERIOR_NASAL_RESTRICTION: 0
OD_NORMAL: 1
OD_INFERIOR_NASAL_RESTRICTION: 0
METHOD: COUNTING FINGERS
OD_INFERIOR_TEMPORAL_RESTRICTION: 0
OS_INFERIOR_TEMPORAL_RESTRICTION: 0
OS_SUPERIOR_TEMPORAL_RESTRICTION: 0

## 2025-09-02 ASSESSMENT — EXTERNAL EXAM - RIGHT EYE: OD_EXAM: NORMAL

## 2025-09-02 ASSESSMENT — TONOMETRY
OS_IOP_MMHG: 16
OD_IOP_MMHG: 20
IOP_METHOD: APPLANATION

## 2025-09-02 ASSESSMENT — CUP TO DISC RATIO
OD_RATIO: 0.55
OS_RATIO: 0.5

## 2025-09-02 ASSESSMENT — SLIT LAMP EXAM - LIDS
COMMENTS: SUBTLE PTOSIS
COMMENTS: NORMAL

## 2025-09-02 ASSESSMENT — EXTERNAL EXAM - LEFT EYE: OS_EXAM: NORMAL

## (undated) DEVICE — EYE LENS CARTRIDGE D ALCON MONARCH

## (undated) DEVICE — GLOVE BIOGEL PI ULTRATOUCH SZ 7.5 41175

## (undated) DEVICE — SOL WATER IRRIG 500ML BOTTLE 2F7113

## (undated) DEVICE — Device

## (undated) DEVICE — EYE KNIFE STILETTO VISITEC 1.1MM ANG 45DEG SIDEPORT 376620

## (undated) DEVICE — EYE KNIFE CRESCENT 55DEG 373807

## (undated) DEVICE — PACK CATARACT CUSTOM ASC SEY15CPUMC

## (undated) DEVICE — EYE HANDPIECE 23GA VITRECTOMY CENTURION 8065752134

## (undated) DEVICE — EYE SPONGE SPEAR WECK CEL 0008685

## (undated) DEVICE — GLOVE BIOGEL PI MICRO SZ 7.5 48575

## (undated) DEVICE — EYE CANN IRR 20GA ACM LEWICKY 585061

## (undated) DEVICE — EYE KNIFE SLIT XSTAR VISITEC 2.6MM 45DEG 373726

## (undated) DEVICE — EYE CANN IRR 27GA ANTERIOR CHAMBER 581280

## (undated) DEVICE — PACKER/CHANG 19G IOL CUTTER

## (undated) DEVICE — EYE TIP IRRIGATION & ASPIRATION POLYMER CVD 0.3MM 8065751512

## (undated) DEVICE — EYE SHIELD PLASTIC

## (undated) DEVICE — LINEN TOWEL PACK X5 5464

## (undated) DEVICE — EYE PACK CUSTOM ANTERIOR 30DEG TIP CENTURION PPK6682-04

## (undated) DEVICE — EYE SHIELD OPHTHALMIC 8MM VISITEC 581062

## (undated) DEVICE — EYE VALVED ENTRY SYSTEM 23GA 8065751585

## (undated) RX ORDER — FENTANYL CITRATE 50 UG/ML
INJECTION, SOLUTION INTRAMUSCULAR; INTRAVENOUS
Status: DISPENSED
Start: 2025-06-09

## (undated) RX ORDER — ONDANSETRON 2 MG/ML
INJECTION INTRAMUSCULAR; INTRAVENOUS
Status: DISPENSED
Start: 2025-06-09

## (undated) RX ORDER — KETOROLAC TROMETHAMINE 30 MG/ML
INJECTION, SOLUTION INTRAMUSCULAR; INTRAVENOUS
Status: DISPENSED
Start: 2025-06-09

## (undated) RX ORDER — PROPOFOL 10 MG/ML
INJECTION, EMULSION INTRAVENOUS
Status: DISPENSED
Start: 2025-06-09

## (undated) RX ORDER — LORAZEPAM 0.5 MG/1
TABLET ORAL
Status: DISPENSED
Start: 2025-06-09

## (undated) RX ORDER — ACETAMINOPHEN 325 MG/1
TABLET ORAL
Status: DISPENSED
Start: 2025-06-09